# Patient Record
Sex: MALE | Race: WHITE | NOT HISPANIC OR LATINO | Employment: FULL TIME | URBAN - METROPOLITAN AREA
[De-identification: names, ages, dates, MRNs, and addresses within clinical notes are randomized per-mention and may not be internally consistent; named-entity substitution may affect disease eponyms.]

---

## 2018-08-10 ENCOUNTER — APPOINTMENT (OUTPATIENT)
Dept: PREADMISSION TESTING | Facility: HOSPITAL | Age: 53
End: 2018-08-10

## 2018-08-10 ENCOUNTER — HOSPITAL ENCOUNTER (OUTPATIENT)
Dept: GENERAL RADIOLOGY | Facility: HOSPITAL | Age: 53
Discharge: HOME | End: 2018-08-10
Admitting: COLON & RECTAL SURGERY

## 2018-08-10 VITALS — WEIGHT: 255.51 LBS | BODY MASS INDEX: 34.61 KG/M2 | HEIGHT: 72 IN

## 2018-08-10 DIAGNOSIS — Z01.89 LABORATORY TEST: Primary | ICD-10-CM

## 2018-08-10 LAB
ABO GROUP BLD: NORMAL
ANION GAP SERPL CALCULATED.3IONS-SCNC: 2 MMOL/L (ref 3–11)
BUN BLD-MCNC: 16 MG/DL (ref 9–23)
BUN/CREAT SERPL: 19.5 (ref 7–25)
CALCIUM SPEC-SCNC: 9.2 MG/DL (ref 8.7–10.4)
CHLORIDE SERPL-SCNC: 109 MMOL/L (ref 99–109)
CO2 SERPL-SCNC: 30 MMOL/L (ref 20–31)
CREAT BLD-MCNC: 0.82 MG/DL (ref 0.6–1.3)
DEPRECATED RDW RBC AUTO: 44 FL (ref 37–54)
ERYTHROCYTE [DISTWIDTH] IN BLOOD BY AUTOMATED COUNT: 14.1 % (ref 11.3–14.5)
GFR SERPL CREATININE-BSD FRML MDRD: 98 ML/MIN/1.73
GLUCOSE BLD-MCNC: 138 MG/DL (ref 70–100)
HBA1C MFR BLD: 6.7 % (ref 4.8–5.6)
HCT VFR BLD AUTO: 42 % (ref 38.9–50.9)
HGB BLD-MCNC: 13.1 G/DL (ref 13.1–17.5)
MCH RBC QN AUTO: 26.8 PG (ref 27–31)
MCHC RBC AUTO-ENTMCNC: 31.2 G/DL (ref 32–36)
MCV RBC AUTO: 85.9 FL (ref 80–99)
PLATELET # BLD AUTO: 223 10*3/MM3 (ref 150–450)
PMV BLD AUTO: 11.7 FL (ref 6–12)
POTASSIUM BLD-SCNC: 4.3 MMOL/L (ref 3.5–5.5)
RBC # BLD AUTO: 4.89 10*6/MM3 (ref 4.2–5.76)
RH BLD: NEGATIVE
SODIUM BLD-SCNC: 141 MMOL/L (ref 132–146)
WBC NRBC COR # BLD: 6.99 10*3/MM3 (ref 3.5–10.8)

## 2018-08-10 PROCEDURE — 93010 ELECTROCARDIOGRAM REPORT: CPT | Performed by: INTERNAL MEDICINE

## 2018-08-10 PROCEDURE — 86901 BLOOD TYPING SEROLOGIC RH(D): CPT

## 2018-08-10 PROCEDURE — 36415 COLL VENOUS BLD VENIPUNCTURE: CPT

## 2018-08-10 PROCEDURE — 85027 COMPLETE CBC AUTOMATED: CPT | Performed by: COLON & RECTAL SURGERY

## 2018-08-10 PROCEDURE — 83036 HEMOGLOBIN GLYCOSYLATED A1C: CPT | Performed by: COLON & RECTAL SURGERY

## 2018-08-10 PROCEDURE — 86900 BLOOD TYPING SEROLOGIC ABO: CPT

## 2018-08-10 PROCEDURE — 93005 ELECTROCARDIOGRAM TRACING: CPT

## 2018-08-10 PROCEDURE — 71046 X-RAY EXAM CHEST 2 VIEWS: CPT

## 2018-08-10 PROCEDURE — 80048 BASIC METABOLIC PNL TOTAL CA: CPT | Performed by: COLON & RECTAL SURGERY

## 2018-08-10 RX ORDER — AMOXICILLIN 500 MG/1
500 CAPSULE ORAL 2 TIMES DAILY
COMMUNITY
End: 2018-08-18 | Stop reason: HOSPADM

## 2018-08-10 RX ORDER — PHENOL 1.4 %
AEROSOL, SPRAY (ML) MUCOUS MEMBRANE AS NEEDED
COMMUNITY

## 2018-08-10 RX ORDER — LISINOPRIL 10 MG/1
10 TABLET ORAL DAILY
COMMUNITY
End: 2022-05-13 | Stop reason: SDUPTHER

## 2018-08-10 RX ORDER — ASPIRIN 81 MG/1
81 TABLET, CHEWABLE ORAL DAILY
COMMUNITY

## 2018-08-10 NOTE — DISCHARGE INSTRUCTIONS

## 2018-08-10 NOTE — PAT
Patient to apply Chlorhexadine wipes  to surgical area (as instructed) the night before procedure and the AM of procedure. Wipes provided.    ERAS info given and explained to patient.    Patient instructed to drink 20 ounces (or until full) of Gatorade or 20 ounces of G2 (if diabetic) and complete 3 hours before your surgery start time. (NO RED Gatorade or G2)    Patient verbalized understanding.    GI nurse navigator notified of upcoming admission.

## 2018-08-14 ENCOUNTER — ANESTHESIA EVENT (OUTPATIENT)
Dept: PERIOP | Facility: HOSPITAL | Age: 53
End: 2018-08-14

## 2018-08-14 RX ORDER — FAMOTIDINE 10 MG/ML
20 INJECTION, SOLUTION INTRAVENOUS ONCE
Status: CANCELLED | OUTPATIENT
Start: 2018-08-14 | End: 2018-08-14

## 2018-08-15 ENCOUNTER — HOSPITAL ENCOUNTER (INPATIENT)
Facility: HOSPITAL | Age: 53
LOS: 3 days | Discharge: HOME | End: 2018-08-18
Attending: COLON & RECTAL SURGERY | Admitting: COLON & RECTAL SURGERY

## 2018-08-15 ENCOUNTER — ANESTHESIA (OUTPATIENT)
Dept: PERIOP | Facility: HOSPITAL | Age: 53
End: 2018-08-15

## 2018-08-15 DIAGNOSIS — K57.92 DIVERTICULITIS: ICD-10-CM

## 2018-08-15 PROBLEM — Z90.49 S/P COLON RESECTION: Status: ACTIVE | Noted: 2018-08-15

## 2018-08-15 PROBLEM — R73.03 PREDIABETES: Status: ACTIVE | Noted: 2018-08-15

## 2018-08-15 PROBLEM — D62 ACUTE BLOOD LOSS ANEMIA: Status: ACTIVE | Noted: 2018-08-15

## 2018-08-15 PROBLEM — D72.829 LEUKOCYTOSIS: Status: ACTIVE | Noted: 2018-08-15

## 2018-08-15 PROBLEM — I10 HTN (HYPERTENSION): Status: ACTIVE | Noted: 2018-08-15

## 2018-08-15 PROBLEM — E11.9 DM (DIABETES MELLITUS) (HCC): Status: ACTIVE | Noted: 2018-08-15

## 2018-08-15 LAB
ABO GROUP BLD: NORMAL
BASOPHILS # BLD AUTO: 0.02 10*3/MM3 (ref 0–0.2)
BASOPHILS NFR BLD AUTO: 0.2 % (ref 0–1)
BLD GP AB SCN SERPL QL: NEGATIVE
DEPRECATED RDW RBC AUTO: 43.2 FL (ref 37–54)
DEPRECATED RDW RBC AUTO: 44 FL (ref 37–54)
EOSINOPHIL # BLD AUTO: 0.09 10*3/MM3 (ref 0–0.3)
EOSINOPHIL NFR BLD AUTO: 0.7 % (ref 0–3)
ERYTHROCYTE [DISTWIDTH] IN BLOOD BY AUTOMATED COUNT: 14 % (ref 11.3–14.5)
ERYTHROCYTE [DISTWIDTH] IN BLOOD BY AUTOMATED COUNT: 14.2 % (ref 11.3–14.5)
GLUCOSE BLDC GLUCOMTR-MCNC: 254 MG/DL (ref 70–130)
GLUCOSE BLDC GLUCOMTR-MCNC: 258 MG/DL (ref 70–130)
GLUCOSE BLDC GLUCOMTR-MCNC: 284 MG/DL (ref 70–130)
HCT VFR BLD AUTO: 38.5 % (ref 38.9–50.9)
HCT VFR BLD AUTO: 39.4 % (ref 38.9–50.9)
HGB BLD-MCNC: 12 G/DL (ref 13.1–17.5)
HGB BLD-MCNC: 12 G/DL (ref 13.1–17.5)
IMM GRANULOCYTES # BLD: 0.03 10*3/MM3 (ref 0–0.03)
IMM GRANULOCYTES NFR BLD: 0.2 % (ref 0–0.6)
LYMPHOCYTES # BLD AUTO: 1.2 10*3/MM3 (ref 0.6–4.8)
LYMPHOCYTES NFR BLD AUTO: 9.5 % (ref 24–44)
MCH RBC QN AUTO: 26.3 PG (ref 27–31)
MCH RBC QN AUTO: 26.4 PG (ref 27–31)
MCHC RBC AUTO-ENTMCNC: 30.5 G/DL (ref 32–36)
MCHC RBC AUTO-ENTMCNC: 31.2 G/DL (ref 32–36)
MCV RBC AUTO: 84.8 FL (ref 80–99)
MCV RBC AUTO: 86.4 FL (ref 80–99)
MONOCYTES # BLD AUTO: 0.69 10*3/MM3 (ref 0–1)
MONOCYTES NFR BLD AUTO: 5.5 % (ref 0–12)
NEUTROPHILS # BLD AUTO: 10.65 10*3/MM3 (ref 1.5–8.3)
NEUTROPHILS NFR BLD AUTO: 84.1 % (ref 41–71)
PLATELET # BLD AUTO: 212 10*3/MM3 (ref 150–450)
PLATELET # BLD AUTO: 243 10*3/MM3 (ref 150–450)
PMV BLD AUTO: 11.5 FL (ref 6–12)
PMV BLD AUTO: 11.5 FL (ref 6–12)
POTASSIUM BLDA-SCNC: 4.25 MMOL/L (ref 3.5–5.3)
RBC # BLD AUTO: 4.54 10*6/MM3 (ref 4.2–5.76)
RBC # BLD AUTO: 4.56 10*6/MM3 (ref 4.2–5.76)
RH BLD: NEGATIVE
T&S EXPIRATION DATE: NORMAL
WBC NRBC COR # BLD: 12.65 10*3/MM3 (ref 3.5–10.8)
WBC NRBC COR # BLD: 15.11 10*3/MM3 (ref 3.5–10.8)

## 2018-08-15 PROCEDURE — C1758 CATHETER, URETERAL: HCPCS | Performed by: COLON & RECTAL SURGERY

## 2018-08-15 PROCEDURE — 25010000002 DEXAMETHASONE SODIUM PHOSPHATE 10 MG/ML SOLUTION 1 ML VIAL: Performed by: NURSE ANESTHETIST, CERTIFIED REGISTERED

## 2018-08-15 PROCEDURE — 0T9B80Z DRAINAGE OF BLADDER WITH DRAINAGE DEVICE, VIA NATURAL OR ARTIFICIAL OPENING ENDOSCOPIC: ICD-10-PCS | Performed by: UROLOGY

## 2018-08-15 PROCEDURE — 25010000002 ONDANSETRON PER 1 MG: Performed by: NURSE PRACTITIONER

## 2018-08-15 PROCEDURE — 63710000001 INSULIN LISPRO (HUMAN) PER 5 UNITS: Performed by: NURSE PRACTITIONER

## 2018-08-15 PROCEDURE — 84132 ASSAY OF SERUM POTASSIUM: CPT | Performed by: ANESTHESIOLOGY

## 2018-08-15 PROCEDURE — 88304 TISSUE EXAM BY PATHOLOGIST: CPT | Performed by: COLON & RECTAL SURGERY

## 2018-08-15 PROCEDURE — 86901 BLOOD TYPING SEROLOGIC RH(D): CPT | Performed by: COLON & RECTAL SURGERY

## 2018-08-15 PROCEDURE — 88307 TISSUE EXAM BY PATHOLOGIST: CPT | Performed by: COLON & RECTAL SURGERY

## 2018-08-15 PROCEDURE — 25010000002 ALBUMIN HUMAN 5% PER 50 ML: Performed by: NURSE ANESTHETIST, CERTIFIED REGISTERED

## 2018-08-15 PROCEDURE — C1769 GUIDE WIRE: HCPCS | Performed by: COLON & RECTAL SURGERY

## 2018-08-15 PROCEDURE — 85025 COMPLETE CBC W/AUTO DIFF WBC: CPT | Performed by: COLON & RECTAL SURGERY

## 2018-08-15 PROCEDURE — 25010000002 HYDROMORPHONE PER 4 MG: Performed by: NURSE ANESTHETIST, CERTIFIED REGISTERED

## 2018-08-15 PROCEDURE — 25010000002 ONDANSETRON PER 1 MG: Performed by: NURSE ANESTHETIST, CERTIFIED REGISTERED

## 2018-08-15 PROCEDURE — 0DBP0ZZ EXCISION OF RECTUM, OPEN APPROACH: ICD-10-PCS | Performed by: COLON & RECTAL SURGERY

## 2018-08-15 PROCEDURE — 25010000002 FENTANYL CITRATE (PF) 100 MCG/2ML SOLUTION: Performed by: NURSE ANESTHETIST, CERTIFIED REGISTERED

## 2018-08-15 PROCEDURE — 85027 COMPLETE CBC AUTOMATED: CPT | Performed by: NURSE PRACTITIONER

## 2018-08-15 PROCEDURE — 25010000002 DEXAMETHASONE PER 1 MG: Performed by: NURSE ANESTHETIST, CERTIFIED REGISTERED

## 2018-08-15 PROCEDURE — 0DTN0ZZ RESECTION OF SIGMOID COLON, OPEN APPROACH: ICD-10-PCS | Performed by: COLON & RECTAL SURGERY

## 2018-08-15 PROCEDURE — 86900 BLOOD TYPING SEROLOGIC ABO: CPT | Performed by: COLON & RECTAL SURGERY

## 2018-08-15 PROCEDURE — 25010000002 MORPHINE SULFATE (PF) 2 MG/ML SOLUTION: Performed by: COLON & RECTAL SURGERY

## 2018-08-15 PROCEDURE — 82962 GLUCOSE BLOOD TEST: CPT

## 2018-08-15 PROCEDURE — 25010000003 CEFAZOLIN PER 500 MG: Performed by: NURSE ANESTHETIST, CERTIFIED REGISTERED

## 2018-08-15 PROCEDURE — 0DTJ0ZZ RESECTION OF APPENDIX, OPEN APPROACH: ICD-10-PCS | Performed by: COLON & RECTAL SURGERY

## 2018-08-15 PROCEDURE — 86850 RBC ANTIBODY SCREEN: CPT | Performed by: COLON & RECTAL SURGERY

## 2018-08-15 PROCEDURE — 0D1B0Z4 BYPASS ILEUM TO CUTANEOUS, OPEN APPROACH: ICD-10-PCS | Performed by: COLON & RECTAL SURGERY

## 2018-08-15 PROCEDURE — 25010000002 HEPARIN (PORCINE) PER 1000 UNITS: Performed by: COLON & RECTAL SURGERY

## 2018-08-15 PROCEDURE — 25010000002 PROPOFOL 10 MG/ML EMULSION: Performed by: NURSE ANESTHETIST, CERTIFIED REGISTERED

## 2018-08-15 PROCEDURE — 25010000002 ERTAPENEM 1 GM/100ML SOLUTION: Performed by: COLON & RECTAL SURGERY

## 2018-08-15 PROCEDURE — 63710000001 INSULIN DETEMIR PER 5 UNITS: Performed by: INTERNAL MEDICINE

## 2018-08-15 PROCEDURE — 25010000002 NEOSTIGMINE 10 MG/10ML SOLUTION: Performed by: NURSE ANESTHETIST, CERTIFIED REGISTERED

## 2018-08-15 PROCEDURE — P9041 ALBUMIN (HUMAN),5%, 50ML: HCPCS | Performed by: NURSE ANESTHETIST, CERTIFIED REGISTERED

## 2018-08-15 PROCEDURE — 25010000002 BUPRENORPHINE PER 0.1 MG: Performed by: NURSE ANESTHETIST, CERTIFIED REGISTERED

## 2018-08-15 PROCEDURE — 25810000003 SODIUM CHLORIDE 0.9 % WITH KCL 20 MEQ 20-0.9 MEQ/L-% SOLUTION: Performed by: COLON & RECTAL SURGERY

## 2018-08-15 PROCEDURE — 25810000003 SODIUM CHLORIDE 0.9 % WITH KCL 20 MEQ 20-0.9 MEQ/L-% SOLUTION: Performed by: INTERNAL MEDICINE

## 2018-08-15 DEVICE — SEALANT FIBRIN TISSEEL FZ 4ML: Type: IMPLANTABLE DEVICE | Site: ABDOMEN | Status: FUNCTIONAL

## 2018-08-15 RX ORDER — PROMETHAZINE HYDROCHLORIDE 25 MG/1
25 TABLET ORAL ONCE AS NEEDED
Status: DISCONTINUED | OUTPATIENT
Start: 2018-08-15 | End: 2018-08-15 | Stop reason: HOSPADM

## 2018-08-15 RX ORDER — GLYCOPYRROLATE 0.2 MG/ML
INJECTION INTRAMUSCULAR; INTRAVENOUS AS NEEDED
Status: DISCONTINUED | OUTPATIENT
Start: 2018-08-15 | End: 2018-08-15 | Stop reason: SURG

## 2018-08-15 RX ORDER — PROMETHAZINE HYDROCHLORIDE 25 MG/1
25 SUPPOSITORY RECTAL ONCE AS NEEDED
Status: DISCONTINUED | OUTPATIENT
Start: 2018-08-15 | End: 2018-08-15 | Stop reason: HOSPADM

## 2018-08-15 RX ORDER — ALVIMOPAN 12 MG/1
12 CAPSULE ORAL ONCE
Status: COMPLETED | OUTPATIENT
Start: 2018-08-15 | End: 2018-08-15

## 2018-08-15 RX ORDER — ONDANSETRON 2 MG/ML
INJECTION INTRAMUSCULAR; INTRAVENOUS AS NEEDED
Status: DISCONTINUED | OUTPATIENT
Start: 2018-08-15 | End: 2018-08-15 | Stop reason: SURG

## 2018-08-15 RX ORDER — ROCURONIUM BROMIDE 10 MG/ML
INJECTION, SOLUTION INTRAVENOUS AS NEEDED
Status: DISCONTINUED | OUTPATIENT
Start: 2018-08-15 | End: 2018-08-15 | Stop reason: SURG

## 2018-08-15 RX ORDER — MELOXICAM 7.5 MG/1
15 TABLET ORAL ONCE
Status: COMPLETED | OUTPATIENT
Start: 2018-08-15 | End: 2018-08-15

## 2018-08-15 RX ORDER — DEXTROSE, SODIUM CHLORIDE, AND POTASSIUM CHLORIDE 5; .45; .15 G/100ML; G/100ML; G/100ML
200 INJECTION INTRAVENOUS CONTINUOUS
Status: DISCONTINUED | OUTPATIENT
Start: 2018-08-15 | End: 2018-08-15

## 2018-08-15 RX ORDER — LIDOCAINE HYDROCHLORIDE 10 MG/ML
0.5 INJECTION, SOLUTION EPIDURAL; INFILTRATION; INTRACAUDAL; PERINEURAL ONCE AS NEEDED
Status: COMPLETED | OUTPATIENT
Start: 2018-08-15 | End: 2018-08-15

## 2018-08-15 RX ORDER — LABETALOL HYDROCHLORIDE 5 MG/ML
INJECTION, SOLUTION INTRAVENOUS AS NEEDED
Status: DISCONTINUED | OUTPATIENT
Start: 2018-08-15 | End: 2018-08-15 | Stop reason: SURG

## 2018-08-15 RX ORDER — ONDANSETRON 2 MG/ML
4 INJECTION INTRAMUSCULAR; INTRAVENOUS EVERY 6 HOURS PRN
Status: DISCONTINUED | OUTPATIENT
Start: 2018-08-15 | End: 2018-08-18 | Stop reason: HOSPADM

## 2018-08-15 RX ORDER — NICOTINE POLACRILEX 4 MG
15 LOZENGE BUCCAL
Status: DISCONTINUED | OUTPATIENT
Start: 2018-08-15 | End: 2018-08-18 | Stop reason: HOSPADM

## 2018-08-15 RX ORDER — DIAZEPAM 5 MG/ML
5 INJECTION, SOLUTION INTRAMUSCULAR; INTRAVENOUS EVERY 6 HOURS PRN
Status: DISCONTINUED | OUTPATIENT
Start: 2018-08-15 | End: 2018-08-18 | Stop reason: HOSPADM

## 2018-08-15 RX ORDER — LABETALOL HYDROCHLORIDE 5 MG/ML
10 INJECTION, SOLUTION INTRAVENOUS EVERY 4 HOURS PRN
Status: DISCONTINUED | OUTPATIENT
Start: 2018-08-15 | End: 2018-08-18 | Stop reason: HOSPADM

## 2018-08-15 RX ORDER — MAGNESIUM HYDROXIDE 1200 MG/15ML
LIQUID ORAL AS NEEDED
Status: DISCONTINUED | OUTPATIENT
Start: 2018-08-15 | End: 2018-08-15 | Stop reason: HOSPADM

## 2018-08-15 RX ORDER — PROMETHAZINE HYDROCHLORIDE 25 MG/ML
6.25 INJECTION, SOLUTION INTRAMUSCULAR; INTRAVENOUS ONCE AS NEEDED
Status: DISCONTINUED | OUTPATIENT
Start: 2018-08-15 | End: 2018-08-15 | Stop reason: HOSPADM

## 2018-08-15 RX ORDER — FAMOTIDINE 20 MG/1
20 TABLET, FILM COATED ORAL ONCE
Status: COMPLETED | OUTPATIENT
Start: 2018-08-15 | End: 2018-08-15

## 2018-08-15 RX ORDER — OXYCODONE AND ACETAMINOPHEN 7.5; 325 MG/1; MG/1
1 TABLET ORAL ONCE AS NEEDED
Status: DISCONTINUED | OUTPATIENT
Start: 2018-08-15 | End: 2018-08-15 | Stop reason: HOSPADM

## 2018-08-15 RX ORDER — ONDANSETRON 2 MG/ML
4 INJECTION INTRAMUSCULAR; INTRAVENOUS ONCE AS NEEDED
Status: COMPLETED | OUTPATIENT
Start: 2018-08-15 | End: 2018-08-15

## 2018-08-15 RX ORDER — HEPARIN SODIUM 5000 [USP'U]/ML
5000 INJECTION, SOLUTION INTRAVENOUS; SUBCUTANEOUS EVERY 8 HOURS SCHEDULED
Status: DISCONTINUED | OUTPATIENT
Start: 2018-08-16 | End: 2018-08-18 | Stop reason: HOSPADM

## 2018-08-15 RX ORDER — ACETAMINOPHEN 650 MG/1
650 SUPPOSITORY RECTAL ONCE AS NEEDED
Status: DISCONTINUED | OUTPATIENT
Start: 2018-08-15 | End: 2018-08-15 | Stop reason: HOSPADM

## 2018-08-15 RX ORDER — ALVIMOPAN 12 MG/1
12 CAPSULE ORAL 2 TIMES DAILY
Status: DISCONTINUED | OUTPATIENT
Start: 2018-08-16 | End: 2018-08-16

## 2018-08-15 RX ORDER — NALOXONE HCL 0.4 MG/ML
0.4 VIAL (ML) INJECTION
Status: DISCONTINUED | OUTPATIENT
Start: 2018-08-15 | End: 2018-08-18 | Stop reason: HOSPADM

## 2018-08-15 RX ORDER — MEPERIDINE HYDROCHLORIDE 25 MG/ML
12.5 INJECTION INTRAMUSCULAR; INTRAVENOUS; SUBCUTANEOUS
Status: DISCONTINUED | OUTPATIENT
Start: 2018-08-15 | End: 2018-08-15 | Stop reason: HOSPADM

## 2018-08-15 RX ORDER — SODIUM CHLORIDE, SODIUM LACTATE, POTASSIUM CHLORIDE, CALCIUM CHLORIDE 600; 310; 30; 20 MG/100ML; MG/100ML; MG/100ML; MG/100ML
9 INJECTION, SOLUTION INTRAVENOUS CONTINUOUS
Status: DISCONTINUED | OUTPATIENT
Start: 2018-08-15 | End: 2018-08-15 | Stop reason: HOSPADM

## 2018-08-15 RX ORDER — PREGABALIN 75 MG/1
75 CAPSULE ORAL ONCE
Status: COMPLETED | OUTPATIENT
Start: 2018-08-15 | End: 2018-08-15

## 2018-08-15 RX ORDER — CEFAZOLIN SODIUM 1 G/3ML
INJECTION, POWDER, FOR SOLUTION INTRAMUSCULAR; INTRAVENOUS AS NEEDED
Status: DISCONTINUED | OUTPATIENT
Start: 2018-08-15 | End: 2018-08-15 | Stop reason: SURG

## 2018-08-15 RX ORDER — OXYCODONE AND ACETAMINOPHEN 10; 325 MG/1; MG/1
1 TABLET ORAL EVERY 4 HOURS PRN
Status: DISCONTINUED | OUTPATIENT
Start: 2018-08-15 | End: 2018-08-15 | Stop reason: HOSPADM

## 2018-08-15 RX ORDER — PROPOFOL 10 MG/ML
VIAL (ML) INTRAVENOUS AS NEEDED
Status: DISCONTINUED | OUTPATIENT
Start: 2018-08-15 | End: 2018-08-15 | Stop reason: SURG

## 2018-08-15 RX ORDER — SODIUM CHLORIDE 0.9 % (FLUSH) 0.9 %
1-10 SYRINGE (ML) INJECTION AS NEEDED
Status: DISCONTINUED | OUTPATIENT
Start: 2018-08-15 | End: 2018-08-18 | Stop reason: HOSPADM

## 2018-08-15 RX ORDER — NEOSTIGMINE METHYLSULFATE 1 MG/ML
INJECTION, SOLUTION INTRAVENOUS AS NEEDED
Status: DISCONTINUED | OUTPATIENT
Start: 2018-08-15 | End: 2018-08-15 | Stop reason: SURG

## 2018-08-15 RX ORDER — OXYCODONE HYDROCHLORIDE AND ACETAMINOPHEN 5; 325 MG/1; MG/1
1 TABLET ORAL EVERY 4 HOURS PRN
Status: DISCONTINUED | OUTPATIENT
Start: 2018-08-15 | End: 2018-08-16 | Stop reason: SDUPTHER

## 2018-08-15 RX ORDER — HYDROMORPHONE HYDROCHLORIDE 1 MG/ML
0.5 INJECTION, SOLUTION INTRAMUSCULAR; INTRAVENOUS; SUBCUTANEOUS
Status: DISCONTINUED | OUTPATIENT
Start: 2018-08-15 | End: 2018-08-15 | Stop reason: HOSPADM

## 2018-08-15 RX ORDER — DEXTROSE MONOHYDRATE 25 G/50ML
25 INJECTION, SOLUTION INTRAVENOUS
Status: DISCONTINUED | OUTPATIENT
Start: 2018-08-15 | End: 2018-08-18 | Stop reason: HOSPADM

## 2018-08-15 RX ORDER — DIAZEPAM 5 MG/1
5 TABLET ORAL EVERY 6 HOURS PRN
Status: DISCONTINUED | OUTPATIENT
Start: 2018-08-15 | End: 2018-08-18 | Stop reason: HOSPADM

## 2018-08-15 RX ORDER — FENTANYL CITRATE 50 UG/ML
INJECTION, SOLUTION INTRAMUSCULAR; INTRAVENOUS AS NEEDED
Status: DISCONTINUED | OUTPATIENT
Start: 2018-08-15 | End: 2018-08-15 | Stop reason: SURG

## 2018-08-15 RX ORDER — DEXAMETHASONE SODIUM PHOSPHATE 4 MG/ML
INJECTION, SOLUTION INTRA-ARTICULAR; INTRALESIONAL; INTRAMUSCULAR; INTRAVENOUS; SOFT TISSUE AS NEEDED
Status: DISCONTINUED | OUTPATIENT
Start: 2018-08-15 | End: 2018-08-15 | Stop reason: SURG

## 2018-08-15 RX ORDER — SODIUM CHLORIDE 0.9 % (FLUSH) 0.9 %
1-10 SYRINGE (ML) INJECTION AS NEEDED
Status: DISCONTINUED | OUTPATIENT
Start: 2018-08-15 | End: 2018-08-15 | Stop reason: HOSPADM

## 2018-08-15 RX ORDER — VECURONIUM BROMIDE 1 MG/ML
INJECTION, POWDER, LYOPHILIZED, FOR SOLUTION INTRAVENOUS AS NEEDED
Status: DISCONTINUED | OUTPATIENT
Start: 2018-08-15 | End: 2018-08-15 | Stop reason: SURG

## 2018-08-15 RX ORDER — FENTANYL CITRATE 50 UG/ML
50 INJECTION, SOLUTION INTRAMUSCULAR; INTRAVENOUS
Status: DISCONTINUED | OUTPATIENT
Start: 2018-08-15 | End: 2018-08-15 | Stop reason: HOSPADM

## 2018-08-15 RX ORDER — MORPHINE SULFATE 2 MG/ML
2 INJECTION, SOLUTION INTRAMUSCULAR; INTRAVENOUS EVERY 4 HOURS PRN
Status: DISCONTINUED | OUTPATIENT
Start: 2018-08-15 | End: 2018-08-18 | Stop reason: HOSPADM

## 2018-08-15 RX ORDER — LIDOCAINE HYDROCHLORIDE 10 MG/ML
INJECTION, SOLUTION EPIDURAL; INFILTRATION; INTRACAUDAL; PERINEURAL AS NEEDED
Status: DISCONTINUED | OUTPATIENT
Start: 2018-08-15 | End: 2018-08-15 | Stop reason: SURG

## 2018-08-15 RX ORDER — HEPARIN SODIUM 5000 [USP'U]/ML
5000 INJECTION, SOLUTION INTRAVENOUS; SUBCUTANEOUS ONCE
Status: COMPLETED | OUTPATIENT
Start: 2018-08-15 | End: 2018-08-15

## 2018-08-15 RX ORDER — SODIUM CHLORIDE AND POTASSIUM CHLORIDE 150; 900 MG/100ML; MG/100ML
100 INJECTION, SOLUTION INTRAVENOUS CONTINUOUS
Status: DISCONTINUED | OUTPATIENT
Start: 2018-08-15 | End: 2018-08-17

## 2018-08-15 RX ORDER — SCOLOPAMINE TRANSDERMAL SYSTEM 1 MG/1
1 PATCH, EXTENDED RELEASE TRANSDERMAL ONCE
Status: DISCONTINUED | OUTPATIENT
Start: 2018-08-15 | End: 2018-08-18

## 2018-08-15 RX ORDER — ALBUMIN, HUMAN INJ 5% 5 %
SOLUTION INTRAVENOUS CONTINUOUS PRN
Status: DISCONTINUED | OUTPATIENT
Start: 2018-08-15 | End: 2018-08-15 | Stop reason: SURG

## 2018-08-15 RX ORDER — ACETAMINOPHEN 500 MG
1000 TABLET ORAL ONCE
Status: COMPLETED | OUTPATIENT
Start: 2018-08-15 | End: 2018-08-15

## 2018-08-15 RX ORDER — IPRATROPIUM BROMIDE AND ALBUTEROL SULFATE 2.5; .5 MG/3ML; MG/3ML
3 SOLUTION RESPIRATORY (INHALATION) ONCE AS NEEDED
Status: DISCONTINUED | OUTPATIENT
Start: 2018-08-15 | End: 2018-08-15 | Stop reason: HOSPADM

## 2018-08-15 RX ORDER — ACETAMINOPHEN 325 MG/1
650 TABLET ORAL ONCE AS NEEDED
Status: DISCONTINUED | OUTPATIENT
Start: 2018-08-15 | End: 2018-08-15 | Stop reason: HOSPADM

## 2018-08-15 RX ORDER — ACETAMINOPHEN 500 MG
1000 TABLET ORAL EVERY 6 HOURS
Status: DISPENSED | OUTPATIENT
Start: 2018-08-15 | End: 2018-08-17

## 2018-08-15 RX ADMIN — VECURONIUM BROMIDE 2 MG: 1 INJECTION, POWDER, LYOPHILIZED, FOR SOLUTION INTRAVENOUS at 08:57

## 2018-08-15 RX ADMIN — HYDROMORPHONE HYDROCHLORIDE 0.5 MG: 1 INJECTION, SOLUTION INTRAMUSCULAR; INTRAVENOUS; SUBCUTANEOUS at 12:10

## 2018-08-15 RX ADMIN — NEOSTIGMINE METHYLSULFATE 3 MG: 1 INJECTION, SOLUTION INTRAVENOUS at 11:27

## 2018-08-15 RX ADMIN — DEXAMETHASONE SODIUM PHOSPHATE 61.4 ML: 10 INJECTION, SOLUTION INTRAMUSCULAR; INTRAVENOUS at 07:44

## 2018-08-15 RX ADMIN — PREGABALIN 75 MG: 75 CAPSULE ORAL at 06:58

## 2018-08-15 RX ADMIN — DEXAMETHASONE SODIUM PHOSPHATE 8 MG: 4 INJECTION, SOLUTION INTRAMUSCULAR; INTRAVENOUS at 08:08

## 2018-08-15 RX ADMIN — CEFAZOLIN 1 G: 1 INJECTION, POWDER, FOR SOLUTION INTRAVENOUS at 10:36

## 2018-08-15 RX ADMIN — SCOPOLAMINE 1 PATCH: 1 PATCH, EXTENDED RELEASE TRANSDERMAL at 06:58

## 2018-08-15 RX ADMIN — FENTANYL CITRATE 50 MCG: 50 INJECTION INTRAMUSCULAR; INTRAVENOUS at 12:44

## 2018-08-15 RX ADMIN — FENTANYL CITRATE 50 MCG: 50 INJECTION, SOLUTION INTRAMUSCULAR; INTRAVENOUS at 08:08

## 2018-08-15 RX ADMIN — INSULIN LISPRO 4 UNITS: 100 INJECTION, SOLUTION INTRAVENOUS; SUBCUTANEOUS at 21:26

## 2018-08-15 RX ADMIN — OXYCODONE HYDROCHLORIDE AND ACETAMINOPHEN 1 TABLET: 5; 325 TABLET ORAL at 21:25

## 2018-08-15 RX ADMIN — SODIUM CHLORIDE, POTASSIUM CHLORIDE, SODIUM LACTATE AND CALCIUM CHLORIDE: 600; 310; 30; 20 INJECTION, SOLUTION INTRAVENOUS at 08:56

## 2018-08-15 RX ADMIN — POTASSIUM CHLORIDE, DEXTROSE MONOHYDRATE AND SODIUM CHLORIDE 200 ML/HR: 150; 5; 450 INJECTION, SOLUTION INTRAVENOUS at 18:33

## 2018-08-15 RX ADMIN — FAMOTIDINE 20 MG: 20 TABLET ORAL at 06:58

## 2018-08-15 RX ADMIN — SODIUM CHLORIDE, POTASSIUM CHLORIDE, SODIUM LACTATE AND CALCIUM CHLORIDE 9 ML/HR: 600; 310; 30; 20 INJECTION, SOLUTION INTRAVENOUS at 07:00

## 2018-08-15 RX ADMIN — MORPHINE SULFATE 2 MG: 2 INJECTION, SOLUTION INTRAMUSCULAR; INTRAVENOUS at 13:59

## 2018-08-15 RX ADMIN — HEPARIN SODIUM 5000 UNITS: 5000 INJECTION, SOLUTION INTRAVENOUS; SUBCUTANEOUS at 06:57

## 2018-08-15 RX ADMIN — VECURONIUM BROMIDE 2 MG: 1 INJECTION, POWDER, LYOPHILIZED, FOR SOLUTION INTRAVENOUS at 08:32

## 2018-08-15 RX ADMIN — ONDANSETRON 4 MG: 2 INJECTION INTRAMUSCULAR; INTRAVENOUS at 11:27

## 2018-08-15 RX ADMIN — ERTAPENEM SODIUM 1 G: 1 INJECTION, POWDER, LYOPHILIZED, FOR SOLUTION INTRAMUSCULAR; INTRAVENOUS at 07:40

## 2018-08-15 RX ADMIN — ALVIMOPAN 12 MG: 12 CAPSULE ORAL at 06:58

## 2018-08-15 RX ADMIN — LIDOCAINE HYDROCHLORIDE 0.3 ML: 10 INJECTION, SOLUTION EPIDURAL; INFILTRATION; INTRACAUDAL; PERINEURAL at 06:59

## 2018-08-15 RX ADMIN — MELOXICAM 15 MG: 7.5 TABLET ORAL at 06:58

## 2018-08-15 RX ADMIN — LABETALOL HYDROCHLORIDE 10 MG: 5 INJECTION, SOLUTION INTRAVENOUS at 08:42

## 2018-08-15 RX ADMIN — ONDANSETRON 4 MG: 2 INJECTION, SOLUTION INTRAMUSCULAR; INTRAVENOUS at 12:38

## 2018-08-15 RX ADMIN — OXYCODONE HYDROCHLORIDE AND ACETAMINOPHEN 1 TABLET: 5; 325 TABLET ORAL at 16:32

## 2018-08-15 RX ADMIN — LIDOCAINE HYDROCHLORIDE 50 MG: 10 INJECTION, SOLUTION EPIDURAL; INFILTRATION; INTRACAUDAL; PERINEURAL at 07:36

## 2018-08-15 RX ADMIN — PROPOFOL 200 MG: 10 INJECTION, EMULSION INTRAVENOUS at 07:36

## 2018-08-15 RX ADMIN — INSULIN LISPRO 4 UNITS: 100 INJECTION, SOLUTION INTRAVENOUS; SUBCUTANEOUS at 18:28

## 2018-08-15 RX ADMIN — ROCURONIUM BROMIDE 50 MG: 10 SOLUTION INTRAVENOUS at 07:36

## 2018-08-15 RX ADMIN — LABETALOL HYDROCHLORIDE 10 MG: 5 INJECTION, SOLUTION INTRAVENOUS at 08:13

## 2018-08-15 RX ADMIN — FENTANYL CITRATE 50 MCG: 50 INJECTION, SOLUTION INTRAMUSCULAR; INTRAVENOUS at 07:36

## 2018-08-15 RX ADMIN — GLYCOPYRROLATE 0.4 MG: 0.2 INJECTION, SOLUTION INTRAMUSCULAR; INTRAVENOUS at 11:27

## 2018-08-15 RX ADMIN — DIAZEPAM 5 MG: 5 TABLET ORAL at 23:49

## 2018-08-15 RX ADMIN — ALBUMIN HUMAN: 0.05 INJECTION, SOLUTION INTRAVENOUS at 09:18

## 2018-08-15 RX ADMIN — INSULIN DETEMIR 10 UNITS: 100 INJECTION, SOLUTION SUBCUTANEOUS at 23:44

## 2018-08-15 RX ADMIN — POTASSIUM CHLORIDE AND SODIUM CHLORIDE 200 ML/HR: 900; 150 INJECTION, SOLUTION INTRAVENOUS at 23:38

## 2018-08-15 RX ADMIN — ONDANSETRON 4 MG: 2 INJECTION INTRAMUSCULAR; INTRAVENOUS at 18:38

## 2018-08-15 RX ADMIN — ALBUMIN HUMAN: 0.05 INJECTION, SOLUTION INTRAVENOUS at 09:28

## 2018-08-15 RX ADMIN — ACETAMINOPHEN 1000 MG: 500 TABLET, FILM COATED ORAL at 06:58

## 2018-08-15 NOTE — ANESTHESIA PREPROCEDURE EVALUATION
Anesthesia Evaluation     Patient summary reviewed and Nursing notes reviewed                Airway   Mallampati: I  TM distance: >3 FB  Neck ROM: full  No difficulty expected  Dental      Pulmonary - negative pulmonary ROS   Cardiovascular     (+) hypertension,       Neuro/Psych- negative ROS  GI/Hepatic/Renal/Endo - negative ROS     Musculoskeletal (-) negative ROS    Abdominal    Substance History - negative use     OB/GYN negative ob/gyn ROS         Other                        Anesthesia Plan    ASA 2     general   (Tap)  intravenous induction   Anesthetic plan and risks discussed with patient.    Plan discussed with CRNA.

## 2018-08-15 NOTE — ANESTHESIA PROCEDURE NOTES
Airway  Urgency: elective    Date/Time: 8/15/2018 7:36 AM  End Time:8/15/2018 7:45 AM  Airway not difficult    General Information and Staff    Patient location during procedure: OR  CRNA: HAYLEY HENDRICKSON    Indications and Patient Condition  Indications for airway management: airway protection    Preoxygenated: yes  MILS not maintained throughout  Mask difficulty assessment: 1 - vent by mask    Final Airway Details  Final airway type: endotracheal airway      Successful airway: ETT  Cuffed: yes   Successful intubation technique: direct laryngoscopy  Facilitating devices/methods: anterior pressure/BURP and Bougie (used [facility])  Endotracheal tube insertion site: oral  Blade: Carlin  Blade size: #4  ETT size: 7.5 mm  Cormack-Lehane Classification: grade III - view of epiglottis only  Placement verified by: chest auscultation and capnometry   Measured from: lips  ETT to lips (cm): 23  Number of attempts at approach: 2    Additional Comments  Negative epigastric sounds, Breath sound equal bilaterally with symmetric chest rise and fall

## 2018-08-15 NOTE — ANESTHESIA POSTPROCEDURE EVALUATION
Patient: Tripp Lantigua    Procedure Summary     Date:  08/15/18 Room / Location:   [facility] /  [facility]    Anesthesia Start:  0733 Anesthesia Stop:      Procedures:       OPEN LOW ANTERIOR RESECTION WITH TAKEDOWN OF COLOVESICAL FISTULA, MOBILIZATION OF SPLENIC FLEXIURE, APPENDECTOMY, AND ILEOSTOMY (N/A Abdomen)      CYSTOSCOPY WITH BILATERAL URETERAL STENT PLACEMENT (Bilateral Urethra) Diagnosis:      Surgeon:  Daniel Cline MD Provider:      Anesthesia Type:  general ASA Status:  2          Anesthesia Type: general  Last vitals  BP   90/57   Temp   97.0   Pulse   69   Resp   18     SpO2   98%     Post Anesthesia Care and Evaluation    Patient location during evaluation: PACU  Patient participation: complete - patient participated  Level of consciousness: responsive to physical stimuli  Pain score: 0  Pain management: adequate  Airway patency: patent  Anesthetic complications: No anesthetic complications  PONV Status: none  Cardiovascular status: acceptable and stable  Respiratory status: nasal cannula, unassisted, acceptable, spontaneous ventilation and oral airway  Hydration status: acceptable

## 2018-08-15 NOTE — INTERVAL H&P NOTE
"Pre-Op H&P (See Recent Office Note Attached for Full H&P)    Chief complaint: abdominal pain    HPI:      Patient is a 53 y.o. male who presents with history of stenosis. Symptoms include abdominal pain, nausea, constipation and diarrhea that began about 3 months ago and occur intermittently.     Review of Systems:  General ROS:  no fever, chills, rashes, No change since last office visit  Cardiovascular ROS: no chest pain or dyspnea on exertion  Respiratory ROS: no cough, shortness of breath, or wheezing    Immunization History:   Influenza:  Oct. 2017  Pneumococcal:  denies  Tetanus:  <10 years    Meds:    Medications (Admitted on 8/15/2018)    Hospital Medications    acetaminophen (TYLENOL) tablet 1,000 mg     alvimopan (ENTEREG) capsule 12 mg     ertapenem (INVanz) 1 g/100 mL 0.9% NS VTB (mbp)     famotidine (PEPCID) tablet 20 mg     heparin (porcine) 5000 UNIT/ML injection 5,000 Units     lactated ringers infusion     lidocaine PF 1% (XYLOCAINE) injection 0.5 mL     meloxicam (MOBIC) tablet 15 mg     pregabalin (LYRICA) capsule 75 mg     Scopolamine (TRANSDERM-SCOP) 1.5 MG/3DAYS patch 1 patch     sodium chloride 0.9 % flush 1-10 mL    Outpatient Medications    amoxicillin (AMOXIL) 500 MG capsule     aspirin 81 MG chewable tablet     HYDROCODONE-IBUPROFEN PO     lisinopril (PRINIVIL,ZESTRIL) 10 MG tablet     Melatonin 10 MG tablet          Vital Signs:  /85 (BP Location: Right arm, Patient Position: Lying)   Pulse 81   Temp 97.3 °F (36.3 °C) (Temporal Artery )   Resp 18   Ht 182.9 cm (72\")   Wt 116 kg (255 lb)   SpO2 97%   BMI 34.58 kg/m²     Physical Exam:    CV:  S1S2 regular rate and rhythm, no murmur               Resp:  Clear to auscultation; respirations regular, even and unlabored      Cancer Staging (if applicable)  Cancer Patient: __ yes _x_no __unknown; If yes, clinical stage T:__ N:__M:__, stage group or __N/A    Assessment: Diverticulitis of large intestine    Plan: Open lower anterior " resection with takedown of colovesical fistula      DAMI Pope  8/15/2018   6:52 AM

## 2018-08-15 NOTE — ANESTHESIA PROCEDURE NOTES
Peripheral Block    Patient location during procedure: OR  Start time: 8/15/2018 7:36 AM  Stop time: 8/15/2018 7:44 AM  Reason for block: at surgeon's request and post-op pain management  Performed by  Anesthesiologist: CATRACHITO SAUL  Preanesthetic Checklist  Completed: patient identified, site marked, surgical consent, pre-op evaluation, timeout performed, IV checked, risks and benefits discussed and monitors and equipment checked  Prep:  Pt Position: supine  Sterile barriers:cap, gloves, sterile barriers and mask  Prep: ChloraPrep  Patient monitoring: blood pressure monitoring, continuous pulse oximetry and EKG  Procedure  Sedation:yes  Performed under: general  Guidance:ultrasound guided  Images:still images obtained    Laterality:Bilateral  Block Type:TAP  Injection Technique:single-shot  Needle Type:short-bevel and echogenic  Needle Gauge:20 G    Medications  Comment:Block Injection:  LA dose divided between Right and Left block       Adjuncts:  Decadron 4mg PSF, Buprenex 0.3mg (Per total volume of LA)  Local Injected:bupivacaine 0.25% Local Amount Injected:60mL  Post Assessment  Injection Assessment: negative aspiration for heme, incremental injection and no paresthesia on injection  Patient Tolerance:comfortable throughout block  Complications:no  Additional Notes      Under Ultrasound guidance, a BBraun 4inch 360 degree needle was advanced with Normal Saline hydro dissection of tissue.  The Internal Oblique and Transversus Abdominus muscles where visualized.  At or before the aponeurosis of Internal Oblique, local anesthetic spread was visualized in the Transversus Abdominus Plane. Injection was made incrementally with aspiration every 5 mls.  There was no  intravascular injection,  injection pressure was normal, there was no neural injection, and the procedure was completed without difficulty.  Thank You.

## 2018-08-15 NOTE — H&P
Admission HP     Patient Name: Tripp Lantigua  MRN: [id number]  : 1965  DOS: 8/15/2018    Attending: Daniel Cline MD    Primary Care Provider: Trung Roca MD      Patient Care Team:  Trung Roca MD as PCP - General (Internal Medicine)    Chief complaint:  Diverticulitis    Subjective   Patient is a 53 y.o. male presented for low anterior colon resection by Dr. Cline under GA. He tolerated surgery well and is admitted for further medical management. He had been taking antibiotics for weeks prior to surgery for diverticulitis.    PROCEDURE: Open low anterior resection with mobilization of splenic flexure and low colorectal anastomosis.  Placement of a Daniel-Carvajal drain.  Non-incidental appendectomy.  Placement of a loop ileostomy.  Placement of Seprafilm at the ileostomy.  Placement of Tisseel in the left upper quadrant and colorectal anastomosis.  Placement of bilateral ureteral catheters.  Placement of an omental flap.    Per Dr. Cline's note: (((53-year-old gentleman who has been dealing with recurrent bouts of diverticulitis for years.  However, since April of this year he's had worsening symptoms with ultimate urinary tract infection.  Subsequent colonoscopy CT scan shows a stricture in his colon and a colovesical fistula.  He was initially seen at [facility] and it was recommended he undergo low anterior resection to manage this.  He came to me for a second opinion and I have concurred.  I did talk with the patient about the possibility that he would need to have a diverting loop ileostomy given the degree of inflammation that may be found at the time of surgery.  He agreed to proceed.  Both he and his wife understood the risks, benefits, and alternatives.)))    When seen postop he is drowsy. His pain control is better after morphine. He denies nausea, shortness of breath or chest pain. No hx of DVT or PE.    ( Above is noted/ agree. Seen in his room afterwards.  "Still drowsy, having received pain medicine. Awakens intermittently, cooperative. Wife at bedside. No n/vom. C/o pain. )wy      Allergies:  No Known Allergies    Meds:  Prescriptions Prior to Admission   Medication Sig Dispense Refill Last Dose   • amoxicillin (AMOXIL) 500 MG capsule Take 500 mg by mouth 2 (Two) Times a Day.   8/14/2018 at 0900   • lisinopril (PRINIVIL,ZESTRIL) 10 MG tablet Take 10 mg by mouth Daily.   8/14/2018 at 2200   • aspirin 81 MG chewable tablet Chew 81 mg Daily.   8/13/2018   • HYDROCODONE-IBUPROFEN PO Take  by mouth As Needed.   8/11/2018   • Melatonin 10 MG tablet Take  by mouth As Needed.   8/13/2018       History:   Past Medical History:   Diagnosis Date   • Diverticulosis    • Hypertension    • IBS (irritable bowel syndrome)    • Kidney stone      Past Surgical History:   Procedure Laterality Date   • COLONOSCOPY     • JOINT REPLACEMENT Bilateral     hip     History reviewed. No pertinent family history.  Social History   Substance Use Topics   • Smoking status: Never Smoker   • Smokeless tobacco: Never Used   • Alcohol use Yes      Comment: 4x a week    He is  with no children. He is a teacher at [employer].    Review of Systems  All systems were reviewed and negative except for:  Gastrointestinal: postitive for  constipation and diarrhea    Vital Signs  /88 (BP Location: Left arm, Patient Position: Lying)   Pulse 72   Temp 97.5 °F (36.4 °C) (Oral)   Resp 22   Ht 182.9 cm (72\")   Wt 116 kg (255 lb)   SpO2 98%   BMI 34.58 kg/m²     Physical Exam:    General Appearance:    Alert, cooperative, in no acute distress   Head:    Normocephalic, without obvious abnormality, atraumatic   Eyes:            Lids and lashes normal, conjunctivae and sclerae normal, no   icterus, no pallor, corneas clear   Ears:    Ears appear intact with no abnormalities noted   Neck:   No adenopathy, supple, trachea midline, no thyromegaly, no     carotid bruit, no JVD   Lungs:     Clear to " auscultation,respirations regular, even and                   unlabored    Heart:    Regular rhythm and normal rate, normal S1 and S2, no            murmur, no gallop, no rub, no click   Abdomen:     Soft, mild expected tenderness. PATRICIA present. Midline CDI. Stoma pink, no output/ except small serous fluid.   Genitalia:    Deferred. Hernández- kamini UOP   Extremities:   Moves all extremities well, no edema, no cyanosis, no              redness   Pulses:   Pulses palpable and equal bilaterally   Skin:   No bleeding, bruising or rash   Neurologic:   Cranial nerves 2 - 12 grossly intact, sensation intact       Results from last 7 days  Lab Units 08/15/18  0920 08/10/18  0936   WBC 10*3/mm3 12.65* 6.99   HEMOGLOBIN g/dL 12.0* 13.1   HEMATOCRIT % 38.5* 42.0   PLATELETS 10*3/mm3 243 223       Results from last 7 days  Lab Units 08/10/18  0936   SODIUM mmol/L 141   POTASSIUM mmol/L 4.3   CHLORIDE mmol/L 109   CO2 mmol/L 30.0   BUN mg/dL 16   CREATININE mg/dL 0.82   CALCIUM mg/dL 9.2   GLUCOSE mg/dL 138*     Lab Results   Component Value Date    HGBA1C 6.70 (H) 08/10/2018       Assessment and Plan:   Principal Problem:    S/P LAR with loop ileostomy  Active Problems:    Diverticulitis    HTN (hypertension)    Prediabetes vs DM - based on A1c    Acute blood loss anemia, mild    Leukocytosis, likely reactive      Plan  1. Ambulation  2. Pain control-prns   3. IS-encourage  4. DVT proph- mechs/heparin  5. Bowel regimen- entereg  6. Resume home medications as appropriate  7. Monitor post-op labs  8. DC planning for home when evidence of return of bowel function  9. Diet, IVF per surgeon( will change to IVF without dextrose due to BG in 200s.)wy    HTN  - Hold lisinopril for now  - Monitor BP   - Holding parameters for BP meds  - Labetalol PRN for SBP>170    PreDM vs DM- based on A1C  - hgb A1c on 8/10/18  - Accuchecks ACHS with low dose SSI  - DM educator  ( one time levemir dose tonight)wy.    I have personally performed the  evaluation on this patient. My history is consistant  with HPI obtained. My exam finding are listed above. I have personally reviewed and discussed the above formulated treatment plan with pt , his wife and .  LARRY. Discussed with Dr. Cline.LARRY Wilson  08/15/18  3:13 PM

## 2018-08-15 NOTE — OP NOTE
Operative Note    PRE-OPERATIVE DIAGNOSIS: Diverticulitis with colovesical fistula  POST-OPERATIVE DIAGNOSIS: Same  PROCEEDURE: Open low anterior resection with mobilization of splenic flexure and low colorectal anastomosis.  Placement of a Daniel-Carvajal drain.  Non-incidental appendectomy.  Placement of a loop ileostomy.  Placement of Seprafilm at the ileostomy.  Placement of Tisseel in the left upper quadrant and colorectal anastomosis.  Placement of bilateral ureteral catheters.  Placement of an omental flap.    SURGEON: Dr. Daniel Cline M.D.    FIRST ASSISTANT: Shanae JUAREZ.    INDICATIONS: 53-year-old gentleman who has been dealing with recurrent bouts of diverticulitis for years.  However, since April of this year he's had worsening symptoms with ultimate urinary tract infection.  Subsequent colonoscopy CT scan shows a stricture in his colon and a colovesical fistula.  He was initially seen at [facility] and it was recommended he undergo low anterior resection to manage this.  He came to me for a second opinion and I have concurred.  I did talk with the patient about the possibility that he would need to have a diverting loop ileostomy given the degree of inflammation that may be found at the time of surgery.  He agreed to proceed.  Both he and his wife understood the risks, benefits, and alternatives.    OPERATIVE FINDINGS: At time of operation patient was found to have a markedly inflamed sigmoid colon that was stuck to the posterior wall of the bladder.  This inflammation extended up the descending colon and down along the mesal rectum and along the rectum anteriorly.  This made dissection very difficult and made finding a plane to develop the mesal rectal dissection very difficult.  Because of this difficulty, I had Dr. Shaka James come in and place ureteral catheters in place so that we could avoid injury to the ureters.  Both of these were identified and kept out of our field.  The  ureteral stents were removed at the end of procedure.  Patient had a long torso and it was difficult to mobilize the splenic flexure as the splenic flexure portion of the colon was stuck underneath the spleen.  This required that I opened up incision enough so that I could get the appropriate retraction to dissect the splenic flexure down.  We then performed a mid descending colon to rectal anastomosis taking care to have no twists or tension on the anastomosis.  Taking care to have good blood supply in both bowel segments.  Once the anastomosis was made I performed proctoscopy and we had no evidence of a leak on insufflation and a healthy staple line.  I did leave a Daniel-Carvajal drain because the staple lines did leak.  Because this anastomosis lies an infected field I did sprayed Tisseel over the anastomosis and I raised an omental flap that I placed between the prostate and the rectum, and I also felt it best to perform a diverting loop ileostomy which we did.  In creation of the ileostomy I placed Seprafilm around the stoma to help with future ileostomy takedown.  Note should be made that the tip of the appendix was caught up into the inflammatory mass of the sigmoid colon in the pelvis.  In removing the appendix from the inflammatory mass I felt it needed to be excised so as not to leave an open appendix in the abdomen.    PROCEDURE IN DETAIL: Patient was correctly identified and brought to the operative placed in the supine position on the OR table.  He underwent induction of anesthesia and was intubated.  He had a tap block, Hernández catheter, an OG tube placed for the case.  He was then prepped and draped over the abdomen in the usual sterile fashion.  An appropriate timeout was performed.    A low midline incision was then created.  Dissection was carried down through subcutaneous tissues to gain entrance into the abdomen.  Initial exploration was noted with the above findings.  We began with electrocautery  dissection of the sigmoid colon off the dome of the bladder and to raise it up into the midline.  In the process of doing this I felt patient needed to have ureteral catheters done and Shaka Gardner came in and placed down.  I could feel the stents within the retroperitoneum so that we could avoid injury to the ureter.  Once we had the colon up in the midline, I placed a double Leichter suture over the intramesenteric artery pedicle.  We then carried our dissection over the sacral promontory and attempted to develop the mesal rectal plane.  However, there was significant inflammation in the mesal rectum making this almost impossible.  However we did carry the dissection down posteriorly I did get into the superior hemorrhoidal vessels and these needed to be controlled with suture Leichter.  We then carried the dissection laterally and anteriorly as well.  Finally, I was able to feel soft rectum poppedup into our field.  We used a contour stapler to come across this.  It should be noted that the mouth of the pelvis was quite narrowed from the inflammatory process that was going on.  We did identify a fistula between the colon and the bladder during the course of our dissection and I used a stitch to suture off the colon so that it would not leak.  Once the rectum was stapled our attention was turned towards the mid descending colon with the colon was also soft and supple.  I used clamps and ties to divide the mesal colon.  We then used a contour stapler, cross the rectum after placing the anvil of a 31 CEEA stapler into the descending colon.  Once we used a contour stapler, cross the mid descending colon spike and the anvil was then advanced into our field.  The colon specimen was handed off the field.    We then turned our attention to mobilizing the splenic flexure which was very difficult.  This was primarily the case because the patient's splenic flexure lied underneath the spleen and was very high.  We had to open  the incision several centimeters above the umbilicus in order to get a good view.  Once we had the splenic flexure down we mobilized the omentum off of the distal transverse colon.  At this point we had enough length to create an end and anastomosis without tension or twists.  We performed end and anastomosis with a CEA stapler and then leak tested it without any evidence of leak.  It should be noted there was some leaking of the staple line and because of this we did leave a Daniel-Carvajal drain into the pelvis through a left lower quadrant stab wound.  Once the anastomosis was made the abdomen was thoroughly irrigated with 3 L of fluid and clear return.  It should be noted that any contamination was confined to the pelvis and minimal.  Once irrigation was finished, we used Tisseel to spray and a left upper quadrant.  We had left a gauze in that area and it was dry.  We also sprayed Tisseel around the posterior wall the bladder and the anastomosis.  Finally, I raised a pedicle of omentum and used a 30 stitch to tack this down into the low pelvis to separate my anastomosis from the inflamed field on the posterior wall of the bladder.      Once all of this was done I looked at the appendix and noted that it was stuck to a loop of small bowel and cut up an inflammatory mass that I had previously peeled off the colon.  In dividing the appendix off of the small bowel I noted that it had a hole in the distal tip.  For this reason I felt the best course of action was an appendectomy.  We proceeded with dissecting the mesal appendix and dividing the vessel and then placing a 2-0 Prolene pursestring stitch around the base of the cecum after we raise it up into the field.  I placed a Madhavi across the appendiceal stump and placed a tie beneath the Madhavi.  I used a knife to divide the appendix and then cauterized the base of it.  It was then dunked into the cecum and sutured closed with a pursestring I had previously placed.   Once this was done I was happy with it was replaced back into the abdomen.      I then developed a loop of small bowel that would be suitable for a loop ileostomy.  I felt the patient needed an ileostomy given the inflamed field and which I placed the colorectal anastomosis in, the 1200 cc of blood that we lost for this case, and the significant amount of inflammation within the lower pelvis in general.  We created a turbine through the right rectus muscle.  Small bowel was delivered onto the abdomen after placement of Seprafilm around the small bowel.  It was anchored in place with a 16 Vietnamese red rubber catheter.      At this point the abdomen was closed with a #1 looped PDS.  The wound was irrigated.  We did close Tabitha's fascia with 2-0 Vicryl sutures we used a 3-0 Monocryl stitch to close the skin and a perenio wound dressing placed.  Daniel-Carvajal drain was secured with a drain stitch.  Once a midline incision was closed the ileostomy was opened and matured in a Christina technique over the red rubber catheter and an appliance placed.    At the end of the case the ureteral stents were removed.  A Hernández catheter was replaced.  The patient was awakened extubated and brought to recovery room in good condition.      Daniel Cline MD  08/15/18  11:22 AM

## 2018-08-15 NOTE — OP NOTE
Preoperative diagnosis: Sigmoid diverticulitis  Postoperative diagnosis: Same  Procedure performed cystoscopy bilateral ureteral catheterization  Surgeon: Kendra  Anesthesia: Gen.  Indications: This is a 53-year-old white male who is already asleep and having abdominal exploration with low anterior resection for severe diverticular disease.  That operation is being performed by Dr. Cline and is in progress.  I have been asked to see for intraoperative consultation for ureteral catheter insertion.    Upper description: Patient was already asleep and in the lithotomy position when I entered the operating theater.  His penis had a Hernández catheter indwelling was removed.  I then used the 21 Korean [facility] panendoscope under video cystoscopy.  The urethra suspected no be normal.  The prostate was small and obstructing the bladder was entered.  Orifices were in their normal location and effluxing clear urine.  The bladder was inspected circumferentially with the 30 and 70° lenses.  There were no lesions tumors trabeculations fistula or other abnormalities.  I then sequentially cannulated each orifice with a 6 Korean ureteral catheter on the left side and a 5 Korean ureteral catheter on the right side both of these were passed blindly up to the kidneys without difficulty.  They were left in position and sutured to a 16 Korean Pa catheter which I inserted after removing his cystoscope.  These will be left in place for ureteral identification to be removed in a few days when the Hernández catheter comes out.    There were no complications    The hospital dictation system is broken so this was done on a voice recognition system.  There may be significant transcription errors.

## 2018-08-15 NOTE — PLAN OF CARE
Problem: Patient Care Overview  Goal: Plan of Care Review  Outcome: Ongoing (interventions implemented as appropriate)   08/15/18 1905   Coping/Psychosocial   Plan of Care Reviewed With patient;spouse   Plan of Care Review   Progress improving     Goal: Discharge Needs Assessment  Outcome: Ongoing (interventions implemented as appropriate)   08/15/18 1400 08/15/18 1905   Discharge Needs Assessment   Readmission Within the Last 30 Days --  no previous admission in last 30 days   Concerns to be Addressed --  denies needs/concerns at this time   Patient/Family Anticipates Transition to home with family --    Patient/Family Anticipated Services at Transition --  none   Transportation Concerns --  car, none   Transportation Anticipated car, drives self --    Anticipated Changes Related to Illness --  none   Equipment Needed After Discharge --  colostomy/ostomy supplies   Disability   Equipment Currently Used at Home --  none       Problem: Pain, Acute (Adult)  Goal: Identify Related Risk Factors and Signs and Symptoms  Outcome: Ongoing (interventions implemented as appropriate)   08/15/18 1905   Pain, Acute (Adult)   Related Risk Factors (Acute Pain) surgery      08/15/18 1905   Pain, Acute (Adult)   Related Risk Factors (Acute Pain) surgery   Signs and Symptoms (Acute Pain) verbalization of pain descriptors     Goal: Acceptable Pain Control/Comfort Level  Outcome: Ongoing (interventions implemented as appropriate)   08/15/18 1905   Pain, Acute (Adult)   Acceptable Pain Control/Comfort Level making progress toward outcome

## 2018-08-16 LAB
ANION GAP SERPL CALCULATED.3IONS-SCNC: 4 MMOL/L (ref 3–11)
BASOPHILS # BLD AUTO: 0 10*3/MM3 (ref 0–0.2)
BASOPHILS NFR BLD AUTO: 0 % (ref 0–1)
BUN BLD-MCNC: 10 MG/DL (ref 9–23)
BUN/CREAT SERPL: 12.3 (ref 7–25)
CALCIUM SPEC-SCNC: 8 MG/DL (ref 8.7–10.4)
CHLORIDE SERPL-SCNC: 105 MMOL/L (ref 99–109)
CO2 SERPL-SCNC: 26 MMOL/L (ref 20–31)
CREAT BLD-MCNC: 0.81 MG/DL (ref 0.6–1.3)
DEPRECATED RDW RBC AUTO: 44.2 FL (ref 37–54)
EOSINOPHIL # BLD AUTO: 0 10*3/MM3 (ref 0–0.3)
EOSINOPHIL NFR BLD AUTO: 0 % (ref 0–3)
ERYTHROCYTE [DISTWIDTH] IN BLOOD BY AUTOMATED COUNT: 14.3 % (ref 11.3–14.5)
GFR SERPL CREATININE-BSD FRML MDRD: 99 ML/MIN/1.73
GLUCOSE BLD-MCNC: 165 MG/DL (ref 70–100)
GLUCOSE BLDC GLUCOMTR-MCNC: 115 MG/DL (ref 70–130)
GLUCOSE BLDC GLUCOMTR-MCNC: 152 MG/DL (ref 70–130)
GLUCOSE BLDC GLUCOMTR-MCNC: 156 MG/DL (ref 70–130)
GLUCOSE BLDC GLUCOMTR-MCNC: 174 MG/DL (ref 70–130)
HCT VFR BLD AUTO: 34.5 % (ref 38.9–50.9)
HGB BLD-MCNC: 10.7 G/DL (ref 13.1–17.5)
IMM GRANULOCYTES # BLD: 0.02 10*3/MM3 (ref 0–0.03)
IMM GRANULOCYTES NFR BLD: 0.1 % (ref 0–0.6)
LYMPHOCYTES # BLD AUTO: 1.18 10*3/MM3 (ref 0.6–4.8)
LYMPHOCYTES NFR BLD AUTO: 8.2 % (ref 24–44)
MAGNESIUM SERPL-MCNC: 2.1 MG/DL (ref 1.3–2.7)
MCH RBC QN AUTO: 26.6 PG (ref 27–31)
MCHC RBC AUTO-ENTMCNC: 31 G/DL (ref 32–36)
MCV RBC AUTO: 85.6 FL (ref 80–99)
MONOCYTES # BLD AUTO: 1.14 10*3/MM3 (ref 0–1)
MONOCYTES NFR BLD AUTO: 7.9 % (ref 0–12)
NEUTROPHILS # BLD AUTO: 12.09 10*3/MM3 (ref 1.5–8.3)
NEUTROPHILS NFR BLD AUTO: 83.9 % (ref 41–71)
PLATELET # BLD AUTO: 214 10*3/MM3 (ref 150–450)
PMV BLD AUTO: 11.9 FL (ref 6–12)
POTASSIUM BLD-SCNC: 4.7 MMOL/L (ref 3.5–5.5)
RBC # BLD AUTO: 4.03 10*6/MM3 (ref 4.2–5.76)
SODIUM BLD-SCNC: 135 MMOL/L (ref 132–146)
WBC NRBC COR # BLD: 14.41 10*3/MM3 (ref 3.5–10.8)

## 2018-08-16 PROCEDURE — 83735 ASSAY OF MAGNESIUM: CPT | Performed by: COLON & RECTAL SURGERY

## 2018-08-16 PROCEDURE — 82962 GLUCOSE BLOOD TEST: CPT

## 2018-08-16 PROCEDURE — G0108 DIAB MANAGE TRN  PER INDIV: HCPCS | Performed by: REGISTERED NURSE

## 2018-08-16 PROCEDURE — 80048 BASIC METABOLIC PNL TOTAL CA: CPT | Performed by: COLON & RECTAL SURGERY

## 2018-08-16 PROCEDURE — 25010000002 HEPARIN (PORCINE) PER 1000 UNITS: Performed by: COLON & RECTAL SURGERY

## 2018-08-16 PROCEDURE — 25010000002 PIPERACILLIN SOD-TAZOBACTAM PER 1 G: Performed by: COLON & RECTAL SURGERY

## 2018-08-16 PROCEDURE — 85025 COMPLETE CBC W/AUTO DIFF WBC: CPT | Performed by: COLON & RECTAL SURGERY

## 2018-08-16 PROCEDURE — 25810000003 SODIUM CHLORIDE 0.9 % WITH KCL 20 MEQ 20-0.9 MEQ/L-% SOLUTION: Performed by: INTERNAL MEDICINE

## 2018-08-16 PROCEDURE — 25010000002 ONDANSETRON PER 1 MG: Performed by: NURSE PRACTITIONER

## 2018-08-16 PROCEDURE — 25010000002 MORPHINE SULFATE (PF) 2 MG/ML SOLUTION: Performed by: COLON & RECTAL SURGERY

## 2018-08-16 PROCEDURE — 25810000003 SODIUM CHLORIDE 0.9 % WITH KCL 20 MEQ 20-0.9 MEQ/L-% SOLUTION: Performed by: COLON & RECTAL SURGERY

## 2018-08-16 RX ORDER — DEXTROSE AND SODIUM CHLORIDE 5; .45 G/100ML; G/100ML
100 INJECTION, SOLUTION INTRAVENOUS CONTINUOUS
Status: DISCONTINUED | OUTPATIENT
Start: 2018-08-16 | End: 2018-08-16

## 2018-08-16 RX ORDER — OXYCODONE HYDROCHLORIDE AND ACETAMINOPHEN 5; 325 MG/1; MG/1
1 TABLET ORAL EVERY 4 HOURS PRN
Status: DISCONTINUED | OUTPATIENT
Start: 2018-08-16 | End: 2018-08-18 | Stop reason: HOSPADM

## 2018-08-16 RX ORDER — LISINOPRIL 10 MG/1
10 TABLET ORAL DAILY
Status: DISCONTINUED | OUTPATIENT
Start: 2018-08-16 | End: 2018-08-17

## 2018-08-16 RX ADMIN — TAZOBACTAM SODIUM AND PIPERACILLIN SODIUM 3.38 G: 375; 3 INJECTION, SOLUTION INTRAVENOUS at 13:30

## 2018-08-16 RX ADMIN — ONDANSETRON 4 MG: 2 INJECTION INTRAMUSCULAR; INTRAVENOUS at 02:55

## 2018-08-16 RX ADMIN — ACETAMINOPHEN 1000 MG: 500 TABLET, FILM COATED ORAL at 13:30

## 2018-08-16 RX ADMIN — OXYCODONE HYDROCHLORIDE AND ACETAMINOPHEN 1 TABLET: 5; 325 TABLET ORAL at 06:45

## 2018-08-16 RX ADMIN — POTASSIUM CHLORIDE AND SODIUM CHLORIDE 100 ML/HR: 900; 150 INJECTION, SOLUTION INTRAVENOUS at 20:04

## 2018-08-16 RX ADMIN — INSULIN LISPRO 2 UNITS: 100 INJECTION, SOLUTION INTRAVENOUS; SUBCUTANEOUS at 20:16

## 2018-08-16 RX ADMIN — OXYCODONE HYDROCHLORIDE AND ACETAMINOPHEN 1 TABLET: 5; 325 TABLET ORAL at 10:21

## 2018-08-16 RX ADMIN — HEPARIN SODIUM 5000 UNITS: 5000 INJECTION, SOLUTION INTRAVENOUS; SUBCUTANEOUS at 13:31

## 2018-08-16 RX ADMIN — INSULIN LISPRO 2 UNITS: 100 INJECTION, SOLUTION INTRAVENOUS; SUBCUTANEOUS at 13:30

## 2018-08-16 RX ADMIN — TAZOBACTAM SODIUM AND PIPERACILLIN SODIUM 3.38 G: 375; 3 INJECTION, SOLUTION INTRAVENOUS at 20:04

## 2018-08-16 RX ADMIN — MORPHINE SULFATE 2 MG: 2 INJECTION, SOLUTION INTRAMUSCULAR; INTRAVENOUS at 16:41

## 2018-08-16 RX ADMIN — ALVIMOPAN 12 MG: 12 CAPSULE ORAL at 20:05

## 2018-08-16 RX ADMIN — ALVIMOPAN 12 MG: 12 CAPSULE ORAL at 08:26

## 2018-08-16 RX ADMIN — DIAZEPAM 5 MG: 5 TABLET ORAL at 20:05

## 2018-08-16 RX ADMIN — LISINOPRIL 10 MG: 10 TABLET ORAL at 20:14

## 2018-08-16 RX ADMIN — ONDANSETRON 4 MG: 2 INJECTION INTRAMUSCULAR; INTRAVENOUS at 16:41

## 2018-08-16 RX ADMIN — OXYCODONE HYDROCHLORIDE AND ACETAMINOPHEN 1 TABLET: 5; 325 TABLET ORAL at 20:05

## 2018-08-16 RX ADMIN — TAZOBACTAM SODIUM AND PIPERACILLIN SODIUM 3.38 G: 375; 3 INJECTION, SOLUTION INTRAVENOUS at 08:26

## 2018-08-16 RX ADMIN — OXYCODONE HYDROCHLORIDE AND ACETAMINOPHEN 1 TABLET: 5; 325 TABLET ORAL at 02:55

## 2018-08-16 RX ADMIN — POTASSIUM CHLORIDE AND SODIUM CHLORIDE 200 ML/HR: 900; 150 INJECTION, SOLUTION INTRAVENOUS at 04:10

## 2018-08-16 RX ADMIN — ONDANSETRON 4 MG: 2 INJECTION INTRAMUSCULAR; INTRAVENOUS at 10:23

## 2018-08-16 RX ADMIN — OXYCODONE HYDROCHLORIDE AND ACETAMINOPHEN 1 TABLET: 5; 325 TABLET ORAL at 15:23

## 2018-08-16 RX ADMIN — HEPARIN SODIUM 5000 UNITS: 5000 INJECTION, SOLUTION INTRAVENOUS; SUBCUTANEOUS at 20:05

## 2018-08-16 RX ADMIN — INSULIN LISPRO 2 UNITS: 100 INJECTION, SOLUTION INTRAVENOUS; SUBCUTANEOUS at 08:27

## 2018-08-16 RX ADMIN — HEPARIN SODIUM 5000 UNITS: 5000 INJECTION, SOLUTION INTRAVENOUS; SUBCUTANEOUS at 06:01

## 2018-08-16 NOTE — PROGRESS NOTES
"[facility] ([facility]    POD # 1   Length of stay: 1 days    Subjective:  Stable Post - Op course.    Vital Signs:  Blood pressure 138/89, pulse 71, temperature 98.5 °F (36.9 °C), temperature source Oral, resp. rate 18, height 182.9 cm (72\"), weight 116 kg (255 lb), SpO2 96 %.    Labs past 24 hours:  Have been reviewed by myself.    I/O last shift:  Have been reviewed by myself.    Exam:  Abdomen soft with mild distention.  Dressings Clean and Intact.  Stoma pink, but not yet functioning.  Pt. Is passing old stool per rectum from his previously obstructed colon.  Labs this am look good.          Assessment:  S/p resection of diverticulitis.    Plan:    D/C yi catheter if urine output good.  Oral pain meds. and limit IV narcotics.  Advance Diet as tolerated.  Up and walking 5 times per day.  Incentive spirometry  Pt. Will need IV abx while in the hospital and plan to transition to oral upon discharge.    Daniel Cline MD  08/16/18  6:49 AM  "

## 2018-08-16 NOTE — PROGRESS NOTES
" IM progress note      Tripp Lantigua  [id number]  1965     LOS: 1 day     Attending: Daniel Cline MD    Primary Care Provider: Trung Roca MD      Chief Complaint/Reason for visit:  Diverticulitis    Subjective   Doing ok. Moderate abd pain, mild nausea. Tolerated soft breakfast, no vomiting. Ambulating in halls. Liquid output from stoma. Has not voided since yi out. Denies f/c/sob/cp.  ( good progress so far. Taking po in moderation. Ambulating often. )wy  Objective     Vital Signs  Blood pressure 133/71, pulse 71, temperature 97.6 °F (36.4 °C), temperature source Oral, resp. rate 18, height 182.9 cm (72\"), weight 116 kg (255 lb), SpO2 96 %.  Temp (24hrs), Av.6 °F (36.4 °C), Min:97 °F (36.1 °C), Max:98.6 °F (37 °C)      Nutrition: soft    Respiratory: RA    Physical Exam:     General Appearance:    Alert, cooperative, in no acute distress   Head:    Normocephalic, without obvious abnormality, atraumatic    Lungs:     Normal effort, symmetric chest rise, no crepitus, clear to      auscultation bilaterally             Heart:    Regular rhythm and normal rate, normal S1 and S2   Abdomen:     Soft, mildly distended. PATRICIA present. Midline CDI. Stoma pink, liquid output.   Extremities:   No clubbing, cyanosis or edema.  No deformities.    Pulses:   Pulses palpable and equal bilaterally   Skin:   No bleeding, bruising or rash   Neurologic:   Moves all extremities with no obvious focal motor deficit.  Cranial nerves 2 - 12 grossly intact     Results Review:     I reviewed the patient's new clinical results.     Results from last 7 days  Lab Units 18  0441 08/15/18  1747 08/15/18  0920   WBC 10*3/mm3 14.41* 15.11* 12.65*   HEMOGLOBIN g/dL 10.7* 12.0* 12.0*   HEMATOCRIT % 34.5* 39.4 38.5*   PLATELETS 10*3/mm3 214 212 243       Results from last 7 days  Lab Units 18  0441 08/10/18  0936   SODIUM mmol/L 135 141   POTASSIUM mmol/L 4.7 4.3   CHLORIDE mmol/L 105 109   CO2 mmol/L 26.0 30.0 "   BUN mg/dL 10 16   CREATININE mg/dL 0.81 0.82   CALCIUM mg/dL 8.0* 9.2   GLUCOSE mg/dL 165* 138*     Results for IRMA WARREN (MRN [id number]) as of 8/16/2018 12:00   Ref. Range 8/15/2018 21:05 8/16/2018 04:41 8/16/2018 07:49 8/16/2018 11:40   Glucose Latest Ref Range: 70 - 130 mg/dL 254 (H) 165 (H) 156 (H) 152 (H)     Results for IRMA WARREN (MRN [id number]) as of 8/16/2018 12:00   Ref. Range 8/16/2018 04:41   Magnesium Latest Ref Range: 1.3 - 2.7 mg/dL 2.1     I reviewed the patient's new imaging including images and reports.    All medications reviewed.     acetaminophen 1,000 mg Oral Q6H   alvimopan 12 mg Oral BID   heparin (porcine) 5,000 Units Subcutaneous Q8H   insulin lispro 0-7 Units Subcutaneous 4x Daily With Meals & Nightly   piperacillin-tazobactam 3.375 g Intravenous Q6H       Assessment/Plan   Principal Problem:    S/P LAR with loop ileostomy  Active Problems:    Diverticulitis    HTN (hypertension)    Prediabetes vs DM - based on A1c    Acute blood loss anemia, mild    Leukocytosis, likely reactive      Plan  1. Ambulation  2. Pain control-prns   3. IS-encouraged  4. DVT proph- mechs/heparin  5. Bowel regimen- entereg  6. Diet, PO soft diet as tolerated. IVF per surgery  7. Monitor post-op labs  8. DC planning for home     HTN  - Hold lisinopril for now/ Resumed 8/16. As BP trending up.wy  - Monitor BP   - Holding parameters for BP meds  - Labetalol PRN for SBP>170     PreDM vs DM- based on A1C  - hgb A1c on 8/10/18  - Accuchecks ACHS with low dose SSI  - Seen by DM educator 8/16  - Consider repeat levemir this evening?    Per Dr. Cline: Pt. Will need IV abx while in the hospital and plan to transition to oral upon discharge    I have personally performed the evaluation on this patient. My history is consistant  with HPI obtained. My exam finding are listed above. I have personally reviewed and discussed the above formulated treatment plan with patient and  APRN.wy.      Brandy Ritchie,  LARRY  08/16/18  11:56 AM

## 2018-08-16 NOTE — CONSULTS
"Diabetes Education  Assessment/Teaching    Patient Name:  Tripp Lantigua  YOB: 1965  MRN: [id number]  Admit Date:  8/15/2018      Assessment Date:  8/16/2018    Most Recent Value   General Information    Referral From:  A1c, Blood glucose, MD order [6.7%]   Height  182.9 cm (72\")   Height Method  Stated   Weight  116 kg (255 lb)   Weight Method  Stated   Pregnancy Assessment   Diabetes History   What type of diabetes do you have?  Type 2   Length of Diabetes Diagnosis  Newly diagnosed <6 months [pt said he does not have diabetes and insist this is temporary. he does admit he has a history of pre diabetes]   Current DM knowledge  fair   Have you had diabetes education/teaching in the past?  no   Do you test your blood sugar at home?  no   Education Preferences   What areas of diabetes would you like to learn about?  avoiding high blood sugar   Nutrition Information   When was the last time you saw a dietician?  never   Assessment Topics   Healthy Eating - Assessment  Needs education   Being Active - Assessment  Needs education   Taking Medication - Assessment  Needs education   Problem Solving - Assessment  Needs education   Reducing Risk - Assessment  Needs education   Healthy Coping - Assessment  Needs education   Monitoring - Assessment  Needs education   DM Goals            Most Recent Value   DM Education Needs   Meter  Meter provided   Meter Type  Contour   Frequency of Testing  Daily [urged him to test 1-2 times a day and f/u with PCP to determine if need further and regimen]   Blood Glucose Target  -- [provided handout of ADA suggested glucose goals. urged to call if >180 ac x2 for wound healing]   Medication  -- [explained insulin regimen IP. Provided and discussed handout about current US oral diabetes medications]   Problem Solving  Hypoglycemia, Hyperglycemia, Sick days, Signs, Symptoms, Treatment [discussed stress induced hyperglycemia ]   Reducing Risks  A1C testing   Physical Activity " Frequency  Discussed exercise importance   Healthy Coping  Appropriate   Discharge Plan  Home   Motivation  Engaged, Strong [wife was active learner,  Mr. lantigua was concerned if he would remember due to his pain meds]   Teaching Method  Explanation, Discussion, Demonstration, Handouts, Teach back   Patient Response  Verbalized understanding, Demonstrates adequately            Other Comments:  Mr. Lantigua was seen for new diagnosis of type 2 diabetes. Discussed and taught patient and his wife about type 2 diabetes self-management, risk factors, and importance of blood glucose control to reduce complications. Target blood glucose readings and A1c goals per ADA were reviewed. Reviewed with patient current A1c and discussed its significance. He said he has a history of pre diabetes and feels his current hyperglycemia is only a result of current illness. Urged him to follow up with PCP soon for further instructions and if glucose >180 mg/dl ac x2 to promote healing. Signs, symptoms and treatment of hyperglycemia and hypoglycemia were discussed. Lifestyle changes such as physical activity with MD approval and healthy eating were encouraged.  Patient provided and demonstrated blood glucose meter.  He asked his wife to participate in the meter education because he was afraid he would not remember due to pain med. She was able to return demonstration appropriately. Patient was encouraged to keep record of blood glucose readings to take to follow up appointment with PCP.  OP education was also encouraged for additional education once discharged.          Electronically signed by:  Merry Rogers RN  08/16/18 10:29 AM

## 2018-08-16 NOTE — PROGRESS NOTES
Discharge Planning Assessment  [facility]     Patient Name: Tripp Lantigua  MRN: [id number]  Today's Date: 8/16/2018    Admit Date: 8/15/2018          Discharge Needs Assessment     Row Name 08/16/18 1016       Living Environment    Lives With spouse    Current Living Arrangements home/apartment/condo    Living Arrangement Comments Spouse can assist after DC as needed.       Discharge Needs Assessment    Equipment Currently Used at Home none    Equipment Needed After Discharge none    Discharge Coordination/Progress No HH involved            Discharge Plan     Row Name 08/16/18 1017       Plan    Plan Home at DC    Patient/Family in Agreement with Plan yes    Plan Comments I spoke with the pt and spouse. No DC needs at this time. Please send ostomy supplies home with the pt at DC.    Row Name 08/16/18 0817       Plan    Final Discharge Disposition Code 01 - home or self-care        Destination     No service coordination in this encounter.      Durable Medical Equipment     No service coordination in this encounter.      Dialysis/Infusion     No service coordination in this encounter.      Home Medical Care     No service coordination in this encounter.      Social Care     No service coordination in this encounter.        Expected Discharge Date and Time     Expected Discharge Date Expected Discharge Time    Aug 18, 2018               Demographic Summary    No documentation.           Functional Status     Row Name 08/16/18 1016       Functional Status    Usual Activity Tolerance excellent       Functional Status, IADL    Medications independent    Meal Preparation independent    Housekeeping independent    Laundry independent    Shopping independent            Psychosocial    No documentation.           Abuse/Neglect    No documentation.           Legal    No documentation.           Substance Abuse    No documentation.           Patient Forms    No documentation.         Tayla Mendoza RN

## 2018-08-16 NOTE — PLAN OF CARE
Problem: Patient Care Overview  Goal: Plan of Care Review  Outcome: Ongoing (interventions implemented as appropriate)   08/16/18 0810   Coping/Psychosocial   Plan of Care Reviewed With patient;spouse   Plan of Care Review   Progress no change   OTHER   Outcome Summary WOC nurse consulted for new loop ileostomy done 8/15/18 per Dr. Cline for diverticulitis. Appliance intact with no leakage at this time. Stoma red and moist; bridge in place; small amount serosanguinous output. Education folder given to pt and wife; extensive education performed including diet, fluids, activity, lifting restrictions, bathing, stomal care, appliance choices and ordering of supplies. WOC nurse will f/u. Please contact WOC nurse as needed for concerns.

## 2018-08-16 NOTE — PLAN OF CARE
Problem: Patient Care Overview  Goal: Plan of Care Review  Outcome: Ongoing (interventions implemented as appropriate)   08/16/18 043   Coping/Psychosocial   Plan of Care Reviewed With patient;spouse   Plan of Care Review   Progress improving   OTHER   Outcome Summary Patient able to walk with stand by assist multiple times this shift. Patient tolerating clear liquids but did require one dose of zofran. Patient responding well to oral pain medications. Patient has support of spouse. Patient eager to learn how to care for ileostomy and future medical plan.     Goal: Discharge Needs Assessment  Outcome: Ongoing (interventions implemented as appropriate)      Problem: Pain, Acute (Adult)  Goal: Identify Related Risk Factors and Signs and Symptoms  Outcome: Ongoing (interventions implemented as appropriate)   08/16/18 4856   Pain, Acute (Adult)   Related Risk Factors (Acute Pain) surgery;patient perception   Signs and Symptoms (Acute Pain) verbalization of pain descriptors;nausea/vomiting/anorexia     Goal: Acceptable Pain Control/Comfort Level  Outcome: Ongoing (interventions implemented as appropriate)   08/16/18 5658   Pain, Acute (Adult)   Acceptable Pain Control/Comfort Level making progress toward outcome

## 2018-08-17 LAB
ANION GAP SERPL CALCULATED.3IONS-SCNC: 2 MMOL/L (ref 3–11)
BASOPHILS # BLD AUTO: 0.01 10*3/MM3 (ref 0–0.2)
BASOPHILS NFR BLD AUTO: 0.1 % (ref 0–1)
BUN BLD-MCNC: 8 MG/DL (ref 9–23)
BUN/CREAT SERPL: 10.1 (ref 7–25)
CALCIUM SPEC-SCNC: 8.2 MG/DL (ref 8.7–10.4)
CHLORIDE SERPL-SCNC: 108 MMOL/L (ref 99–109)
CO2 SERPL-SCNC: 27 MMOL/L (ref 20–31)
CREAT BLD-MCNC: 0.79 MG/DL (ref 0.6–1.3)
DEPRECATED RDW RBC AUTO: 45.8 FL (ref 37–54)
EOSINOPHIL # BLD AUTO: 0.09 10*3/MM3 (ref 0–0.3)
EOSINOPHIL NFR BLD AUTO: 0.9 % (ref 0–3)
ERYTHROCYTE [DISTWIDTH] IN BLOOD BY AUTOMATED COUNT: 14.7 % (ref 11.3–14.5)
GFR SERPL CREATININE-BSD FRML MDRD: 102 ML/MIN/1.73
GLUCOSE BLD-MCNC: 120 MG/DL (ref 70–100)
GLUCOSE BLDC GLUCOMTR-MCNC: 104 MG/DL (ref 70–130)
GLUCOSE BLDC GLUCOMTR-MCNC: 109 MG/DL (ref 70–130)
GLUCOSE BLDC GLUCOMTR-MCNC: 113 MG/DL (ref 70–130)
GLUCOSE BLDC GLUCOMTR-MCNC: 118 MG/DL (ref 70–130)
HCT VFR BLD AUTO: 29.9 % (ref 38.9–50.9)
HGB BLD-MCNC: 9.3 G/DL (ref 13.1–17.5)
IMM GRANULOCYTES # BLD: 0.04 10*3/MM3 (ref 0–0.03)
IMM GRANULOCYTES NFR BLD: 0.4 % (ref 0–0.6)
LYMPHOCYTES # BLD AUTO: 1.02 10*3/MM3 (ref 0.6–4.8)
LYMPHOCYTES NFR BLD AUTO: 9.7 % (ref 24–44)
MAGNESIUM SERPL-MCNC: 2 MG/DL (ref 1.3–2.7)
MCH RBC QN AUTO: 26.7 PG (ref 27–31)
MCHC RBC AUTO-ENTMCNC: 31.1 G/DL (ref 32–36)
MCV RBC AUTO: 85.9 FL (ref 80–99)
MONOCYTES # BLD AUTO: 0.8 10*3/MM3 (ref 0–1)
MONOCYTES NFR BLD AUTO: 7.6 % (ref 0–12)
NEUTROPHILS # BLD AUTO: 8.6 10*3/MM3 (ref 1.5–8.3)
NEUTROPHILS NFR BLD AUTO: 81.7 % (ref 41–71)
PLATELET # BLD AUTO: 167 10*3/MM3 (ref 150–450)
PMV BLD AUTO: 11.9 FL (ref 6–12)
POTASSIUM BLD-SCNC: 4.3 MMOL/L (ref 3.5–5.5)
RBC # BLD AUTO: 3.48 10*6/MM3 (ref 4.2–5.76)
SODIUM BLD-SCNC: 137 MMOL/L (ref 132–146)
WBC NRBC COR # BLD: 10.52 10*3/MM3 (ref 3.5–10.8)

## 2018-08-17 PROCEDURE — 25010000002 HEPARIN (PORCINE) PER 1000 UNITS: Performed by: COLON & RECTAL SURGERY

## 2018-08-17 PROCEDURE — 25010000002 ONDANSETRON PER 1 MG: Performed by: NURSE PRACTITIONER

## 2018-08-17 PROCEDURE — 25010000002 MORPHINE SULFATE (PF) 2 MG/ML SOLUTION: Performed by: COLON & RECTAL SURGERY

## 2018-08-17 PROCEDURE — 25810000003 SODIUM CHLORIDE 0.9 % WITH KCL 20 MEQ 20-0.9 MEQ/L-% SOLUTION: Performed by: COLON & RECTAL SURGERY

## 2018-08-17 PROCEDURE — 85025 COMPLETE CBC W/AUTO DIFF WBC: CPT | Performed by: COLON & RECTAL SURGERY

## 2018-08-17 PROCEDURE — 82962 GLUCOSE BLOOD TEST: CPT

## 2018-08-17 PROCEDURE — 80048 BASIC METABOLIC PNL TOTAL CA: CPT | Performed by: COLON & RECTAL SURGERY

## 2018-08-17 PROCEDURE — 25010000002 PIPERACILLIN SOD-TAZOBACTAM PER 1 G: Performed by: COLON & RECTAL SURGERY

## 2018-08-17 PROCEDURE — 83735 ASSAY OF MAGNESIUM: CPT | Performed by: COLON & RECTAL SURGERY

## 2018-08-17 RX ORDER — LISINOPRIL 10 MG/1
10 TABLET ORAL NIGHTLY
Status: DISCONTINUED | OUTPATIENT
Start: 2018-08-17 | End: 2018-08-18 | Stop reason: HOSPADM

## 2018-08-17 RX ADMIN — TAZOBACTAM SODIUM AND PIPERACILLIN SODIUM 3.38 G: 375; 3 INJECTION, SOLUTION INTRAVENOUS at 09:02

## 2018-08-17 RX ADMIN — POTASSIUM CHLORIDE AND SODIUM CHLORIDE 100 ML/HR: 900; 150 INJECTION, SOLUTION INTRAVENOUS at 06:50

## 2018-08-17 RX ADMIN — OXYCODONE HYDROCHLORIDE AND ACETAMINOPHEN 1 TABLET: 5; 325 TABLET ORAL at 05:29

## 2018-08-17 RX ADMIN — HEPARIN SODIUM 5000 UNITS: 5000 INJECTION, SOLUTION INTRAVENOUS; SUBCUTANEOUS at 21:24

## 2018-08-17 RX ADMIN — TAZOBACTAM SODIUM AND PIPERACILLIN SODIUM 3.38 G: 375; 3 INJECTION, SOLUTION INTRAVENOUS at 21:25

## 2018-08-17 RX ADMIN — OXYCODONE HYDROCHLORIDE AND ACETAMINOPHEN 1 TABLET: 5; 325 TABLET ORAL at 09:23

## 2018-08-17 RX ADMIN — ONDANSETRON 4 MG: 2 INJECTION INTRAMUSCULAR; INTRAVENOUS at 11:56

## 2018-08-17 RX ADMIN — MORPHINE SULFATE 2 MG: 2 INJECTION, SOLUTION INTRAMUSCULAR; INTRAVENOUS at 00:08

## 2018-08-17 RX ADMIN — HEPARIN SODIUM 5000 UNITS: 5000 INJECTION, SOLUTION INTRAVENOUS; SUBCUTANEOUS at 05:29

## 2018-08-17 RX ADMIN — HEPARIN SODIUM 5000 UNITS: 5000 INJECTION, SOLUTION INTRAVENOUS; SUBCUTANEOUS at 13:11

## 2018-08-17 RX ADMIN — TAZOBACTAM SODIUM AND PIPERACILLIN SODIUM 3.38 G: 375; 3 INJECTION, SOLUTION INTRAVENOUS at 02:06

## 2018-08-17 RX ADMIN — LISINOPRIL 10 MG: 10 TABLET ORAL at 21:24

## 2018-08-17 RX ADMIN — OXYCODONE HYDROCHLORIDE AND ACETAMINOPHEN 1 TABLET: 5; 325 TABLET ORAL at 00:08

## 2018-08-17 RX ADMIN — TAZOBACTAM SODIUM AND PIPERACILLIN SODIUM 3.38 G: 375; 3 INJECTION, SOLUTION INTRAVENOUS at 13:11

## 2018-08-17 RX ADMIN — OXYCODONE HYDROCHLORIDE AND ACETAMINOPHEN 1 TABLET: 5; 325 TABLET ORAL at 18:25

## 2018-08-17 RX ADMIN — ACETAMINOPHEN 1000 MG: 500 TABLET, FILM COATED ORAL at 05:29

## 2018-08-17 RX ADMIN — ONDANSETRON 4 MG: 2 INJECTION INTRAMUSCULAR; INTRAVENOUS at 05:34

## 2018-08-17 RX ADMIN — ONDANSETRON 4 MG: 2 INJECTION INTRAMUSCULAR; INTRAVENOUS at 18:32

## 2018-08-17 RX ADMIN — DIAZEPAM 5 MG: 5 TABLET ORAL at 04:01

## 2018-08-17 RX ADMIN — OXYCODONE HYDROCHLORIDE AND ACETAMINOPHEN 1 TABLET: 5; 325 TABLET ORAL at 22:42

## 2018-08-17 RX ADMIN — OXYCODONE HYDROCHLORIDE AND ACETAMINOPHEN 1 TABLET: 5; 325 TABLET ORAL at 13:10

## 2018-08-17 NOTE — PROGRESS NOTES
" IM progress note      Tripp Lantigua  [id number]  1965     LOS: 2 days     Attending: Daniel Cline MD    Primary Care Provider: Trung Roca MD      Chief Complaint/Reason for visit:  Diverticulitis    Subjective   Doing well. Better pain control. Still with mild nausea. Tolerated soft diet. Ambulating frequently. Stoma with output. Denies f/c/sob/cp.  ( Good progress. Stoma bag emptied a few times in afternoon.)wy  Objective     Vital Signs  Blood pressure 127/89, pulse 79, temperature 97.4 °F (36.3 °C), temperature source Oral, resp. rate 18, height 182.9 cm (72\"), weight 116 kg (255 lb), SpO2 95 %.  Temp (24hrs), Av.1 °F (36.7 °C), Min:97.4 °F (36.3 °C), Max:98.8 °F (37.1 °C)      Nutrition: soft    Respiratory: RA    Physical Exam:     General Appearance:    Alert, cooperative, in no acute distress   Head:    Normocephalic, without obvious abnormality, atraumatic    Lungs:     Normal effort, symmetric chest rise, no crepitus, clear to      auscultation bilaterally             Heart:    Regular rhythm and normal rate, normal S1 and S2   Abdomen:     Soft, benign. PATRICIA present. Midline CDI. Stoma pink   Extremities:   No clubbing, cyanosis or edema.  No deformities.    Pulses:   Pulses palpable and equal bilaterally   Skin:   No bleeding, bruising or rash   Neurologic:   Moves all extremities with no obvious focal motor deficit.  Cranial nerves 2 - 12 grossly intact     Results Review:     I reviewed the patient's new clinical results.     Results from last 7 days  Lab Units 18  0508 18  0441 08/15/18  1747   WBC 10*3/mm3 10.52 14.41* 15.11*   HEMOGLOBIN g/dL 9.3* 10.7* 12.0*   HEMATOCRIT % 29.9* 34.5* 39.4   PLATELETS 10*3/mm3 167 214 212       Results from last 7 days  Lab Units 18  0508 18  0441   SODIUM mmol/L 137 135   POTASSIUM mmol/L 4.3 4.7   CHLORIDE mmol/L 108 105   CO2 mmol/L 27.0 26.0   BUN mg/dL 8* 10   CREATININE mg/dL 0.79 0.81   CALCIUM mg/dL 8.2* 8.0* "   GLUCOSE mg/dL 120* 165*     Results for IRMA WARREN (MRN [id number]) as of 8/17/2018 18:27   Ref. Range 8/17/2018 07:33 8/17/2018 11:30 8/17/2018 16:36   Glucose Latest Ref Range: 70 - 130 mg/dL 118 113 109     Results for IRMA WARREN (MRN [id number]) as of 8/16/2018 12:00   Ref. Range 8/16/2018 04:41   Magnesium Latest Ref Range: 1.3 - 2.7 mg/dL 2.1     I reviewed the patient's new imaging including images and reports.    All medications reviewed.     heparin (porcine) 5,000 Units Subcutaneous Q8H   insulin lispro 0-7 Units Subcutaneous 4x Daily With Meals & Nightly   lisinopril 10 mg Oral Nightly   piperacillin-tazobactam 3.375 g Intravenous Q6H       Assessment/Plan   Principal Problem:    S/P LAR with loop ileostomy  Active Problems:    Diverticulitis    HTN (hypertension)    Prediabetes vs DM - based on A1c    Acute blood loss anemia, mild    Leukocytosis, likely reactive      Plan  1. Ambulation  2. Pain control-prns   3. IS-encouraged  4. DVT proph- mechs/heparin  5. Bowel regimen- entereg  6. Diet, PO soft diet as tolerated. IVF per surgery  7. Monitor post-op labs  8. DC planning for home, likely tomorrow    WOCN- stoma teaching    HTN  - Continue lisinopril   - Monitor BP   - Holding parameters for BP meds  - Labetalol PRN for SBP>170     PreDM vs DM- based on A1C  - hgb A1c on 8/10/18  - Accuchecks ACHS with low dose SSI  - Seen by DM educator 8/16    Per Dr. Cline: Pt. Will need IV abx while in the hospital and plan to transition to oral upon discharge    I have personally performed the evaluation on this patient. My history is consistant  with HPI obtained. My exam finding are listed above. I have personally reviewed and discussed the above formulated treatment plan with pt, RN, family friend ( physician/ per pt request/ on the phone) and RAJAT JUAREZ.    LARRY Bronson  08/17/18  6:26 PM

## 2018-08-17 NOTE — PROGRESS NOTES
Continued Stay Note  [facility]     Patient Name: Tripp Lantigua  MRN: [id number]  Today's Date: 8/17/2018    Admit Date: 8/15/2018          Discharge Plan     Row Name 08/17/18 1505       Plan    Plan Home    Patient/Family in Agreement with Plan yes    Plan Comments No DC needs identified. Please send supplies home with the pt at DC.              Discharge Codes    No documentation.       Expected Discharge Date and Time     Expected Discharge Date Expected Discharge Time    Aug 18, 2018             Tayla Mendoza RN

## 2018-08-17 NOTE — PLAN OF CARE
Problem: Patient Care Overview  Goal: Plan of Care Review  Outcome: Ongoing (interventions implemented as appropriate)   08/17/18 5725   Coping/Psychosocial   Plan of Care Reviewed With patient;spouse;other (see comments)  (NICK Bedolla)   Plan of Care Review   Progress improving   OTHER   Outcome Summary WOC nurse for ostomy follow up education. Pt resting in bed, stated he has been up walking. Out put liquid brown. Wants to go home tomorrow morning. Will do appliance change tomorrow before discharge. Educated patient about diet, fluids, ostomy samples, and WOC contact information. Pt stated understanding. Wife engaged in learning. Pt has been emptying himself. Please call with questions or concerns.

## 2018-08-17 NOTE — PROGRESS NOTES
"[facility]    POD # 1   Length of stay: 2 days    Subjective:  Stable Post - Op course.    Vital Signs:  Blood pressure 127/89, pulse 79, temperature 97.4 °F (36.3 °C), temperature source Oral, resp. rate 18, height 182.9 cm (72\"), weight 116 kg (255 lb), SpO2 96 %.    Labs past 24 hours:  Have been reviewed by myself.    I/O last shift:  Have been reviewed by myself.    Exam:  Abdomen soft with mild distention.  Dressings Clean and Intact.  PATRICIA drain with serosang. Output. Ileostomy pink and functioning.        Assessment:  Overall looks well.  PATRICIA output still a little on the high side to remove. Labs look good.  WBC corrected. Alyce. PO's.  Has been up and walking.    Plan:    Keep PATRICIA drain today.  Continue with IV abx today.  Anticipate home tomorrow w/o drain.  I have left scripts for percocet and Augmentin on the chart.  Will follow up with patient in 1 week.    Daniel Cline MD  08/17/18  8:35 AM  "

## 2018-08-17 NOTE — PLAN OF CARE
Problem: Patient Care Overview  Goal: Plan of Care Review  Outcome: Ongoing (interventions implemented as appropriate)   08/17/18 1717   Coping/Psychosocial   Plan of Care Reviewed With patient   Plan of Care Review   Progress improving   OTHER   Outcome Summary Pt able to ambulate in dempsey this shift. Pt has c/o pain and some nausea at times, but controlled with PRNs. Pt emptying ostomy today after education completed this AM with shift assessment. Woc has completed some education as well with the patient and wife. VSS. No new conerns at this time.      Goal: Individualization and Mutuality  Outcome: Outcome(s) achieved Date Met: 08/17/18 08/17/18 1717   Individualization   Patient Specific Preferences door closed, family at bedside   Patient Specific Goals (Include Timeframe) emptying ostomy   Patient Specific Interventions enc woc education, enc emptying by patient- staff assisted this am and assisted with education, verb plan of care   Mutuality/Individual Preferences   What Anxieties, Fears, Concerns, or Questions Do You Have About Your Care? none   What Information Would Help Us Give You More Personalized Care? none   How Would You and/or Your Support Person Like to Participate in Your Care? none   Mutuality/Individual Preferences   How to Address Anxieties/Fears none       Problem: Pain, Acute (Adult)  Intervention: Monitor and Manage Analgesia   08/17/18 1717   Promote Effective Bowel Elimination   Bowel Intervention adequate fluid intake promoted;ambulation promoted;diet adjusted   Safety Management   Medication Review/Management medications reviewed     Intervention: Mutually Develop/Implement Acute Pain Management Plan   08/17/18 1310 08/17/18 1717   Cognitive Interventions   Sensory Stimulation Regulation --  quiet environment promoted   Promote Oxygenation/Perfusion   Pain Management Interventions see MAR;pillow support provided;position adjusted --      Intervention: Support/Optimize Psychosocial  Response to Acute Pain   08/17/18 1717   Coping/Psychosocial Interventions   Supportive Measures active listening utilized;positive reinforcement provided;problem solving facilitated   Psychosocial Support   Diversional Activities smartphone;television   Family/Support System Care presence promoted   Interventions   Trust Relationship/Rapport care explained;choices provided;questions answered       Goal: Identify Related Risk Factors and Signs and Symptoms  Outcome: Ongoing (interventions implemented as appropriate)   08/17/18 1717   Pain, Acute (Adult)   Related Risk Factors (Acute Pain) patient perception;surgery   Signs and Symptoms (Acute Pain) verbalization of pain descriptors     Goal: Acceptable Pain Control/Comfort Level  Outcome: Ongoing (interventions implemented as appropriate)   08/17/18 1717   Pain, Acute (Adult)   Acceptable Pain Control/Comfort Level making progress toward outcome

## 2018-08-17 NOTE — PLAN OF CARE
Problem: Patient Care Overview  Goal: Plan of Care Review  Outcome: Ongoing (interventions implemented as appropriate)   08/17/18 0200 08/17/18 0323   Coping/Psychosocial   Plan of Care Reviewed With patient --    Plan of Care Review   Progress --  improving   OTHER   Outcome Summary --  Stool/Flatus output from iliostomy. No leakage. Stoma red and moist; Pain well controlled with PRN medications available.      Goal: Discharge Needs Assessment  Outcome: Ongoing (interventions implemented as appropriate)    Goal: Interprofessional Rounds/Family Conf  Outcome: Ongoing (interventions implemented as appropriate)   08/17/18 0323   Interdisciplinary Rounds/Family Conf   Participants ;dietitian/nutrition services;ostomy/continence specialist;physician;nursing       Problem: Pain, Acute (Adult)  Goal: Identify Related Risk Factors and Signs and Symptoms  Outcome: Ongoing (interventions implemented as appropriate)   08/16/18 0435 08/17/18 0323   Pain, Acute (Adult)   Related Risk Factors (Acute Pain) surgery;patient perception --    Signs and Symptoms (Acute Pain) --  verbalization of pain descriptors     Goal: Acceptable Pain Control/Comfort Level  Outcome: Ongoing (interventions implemented as appropriate)   08/16/18 0435   Pain, Acute (Adult)   Acceptable Pain Control/Comfort Level making progress toward outcome

## 2018-08-18 VITALS
BODY MASS INDEX: 34.54 KG/M2 | HEIGHT: 72 IN | WEIGHT: 255 LBS | OXYGEN SATURATION: 95 % | RESPIRATION RATE: 18 BRPM | DIASTOLIC BLOOD PRESSURE: 81 MMHG | HEART RATE: 84 BPM | TEMPERATURE: 98.2 F | SYSTOLIC BLOOD PRESSURE: 131 MMHG

## 2018-08-18 LAB
GLUCOSE BLDC GLUCOMTR-MCNC: 115 MG/DL (ref 70–130)
GLUCOSE BLDC GLUCOMTR-MCNC: 117 MG/DL (ref 70–130)

## 2018-08-18 PROCEDURE — 25010000002 HEPARIN (PORCINE) PER 1000 UNITS: Performed by: COLON & RECTAL SURGERY

## 2018-08-18 PROCEDURE — 25010000002 PIPERACILLIN SOD-TAZOBACTAM PER 1 G: Performed by: COLON & RECTAL SURGERY

## 2018-08-18 PROCEDURE — 82962 GLUCOSE BLOOD TEST: CPT

## 2018-08-18 RX ORDER — OXYCODONE HYDROCHLORIDE AND ACETAMINOPHEN 5; 325 MG/1; MG/1
1 TABLET ORAL EVERY 4 HOURS PRN
Start: 2018-08-18 | End: 2018-08-27

## 2018-08-18 RX ORDER — AMOXICILLIN AND CLAVULANATE POTASSIUM 500; 125 MG/1; MG/1
1 TABLET, FILM COATED ORAL 3 TIMES DAILY
Start: 2018-08-18 | End: 2019-06-13

## 2018-08-18 RX ADMIN — DIAZEPAM 5 MG: 5 TABLET ORAL at 01:33

## 2018-08-18 RX ADMIN — TAZOBACTAM SODIUM AND PIPERACILLIN SODIUM 3.38 G: 375; 3 INJECTION, SOLUTION INTRAVENOUS at 01:33

## 2018-08-18 RX ADMIN — HEPARIN SODIUM 5000 UNITS: 5000 INJECTION, SOLUTION INTRAVENOUS; SUBCUTANEOUS at 05:22

## 2018-08-18 RX ADMIN — OXYCODONE HYDROCHLORIDE AND ACETAMINOPHEN 1 TABLET: 5; 325 TABLET ORAL at 10:42

## 2018-08-18 RX ADMIN — TAZOBACTAM SODIUM AND PIPERACILLIN SODIUM 3.38 G: 375; 3 INJECTION, SOLUTION INTRAVENOUS at 08:26

## 2018-08-18 RX ADMIN — OXYCODONE HYDROCHLORIDE AND ACETAMINOPHEN 1 TABLET: 5; 325 TABLET ORAL at 04:32

## 2018-08-18 NOTE — PLAN OF CARE
Problem: Patient Care Overview  Goal: Plan of Care Review  Outcome: Ongoing (interventions implemented as appropriate)   08/17/18 1717 08/17/18 2000   Coping/Psychosocial   Plan of Care Reviewed With --  patient   Plan of Care Review   Progress improving --    OTHER   Outcome Summary Pt able to ambulate in dempsey this shift. Pt has c/o pain and some nausea at times, but controlled with PRNs. Pt emptying ostomy today after education completed this AM with shift assessment. [facility] has completed some education as well with the patient and wife. VSS. No new conerns at this time.  --      Goal: Discharge Needs Assessment  Outcome: Ongoing (interventions implemented as appropriate)   08/15/18 1400 08/15/18 1905 08/16/18 1016   Discharge Needs Assessment   Readmission Within the Last 30 Days --  no previous admission in last 30 days --    Concerns to be Addressed --  denies needs/concerns at this time --    Patient/Family Anticipates Transition to home with family --  --    Patient/Family Anticipated Services at Transition --  none --    Transportation Concerns --  car, none --    Transportation Anticipated car, drives self --  --    Anticipated Changes Related to Illness --  none --    Equipment Needed After Discharge --  --  none   Discharge Coordination/Progress --  --  No HH involved   Disability   Equipment Currently Used at Home --  --  none     Goal: Interprofessional Rounds/Family Conf  Outcome: Ongoing (interventions implemented as appropriate)   08/17/18 0323   Interdisciplinary Rounds/Family Conf   Participants ;dietitian/nutrition services;ostomy/continence specialist;physician;nursing       Problem: Pain, Acute (Adult)  Goal: Identify Related Risk Factors and Signs and Symptoms  Outcome: Ongoing (interventions implemented as appropriate)   08/17/18 1717   Pain, Acute (Adult)   Related Risk Factors (Acute Pain) patient perception;surgery   Signs and Symptoms (Acute Pain) verbalization of pain descriptors      Goal: Acceptable Pain Control/Comfort Level  Outcome: Ongoing (interventions implemented as appropriate)   08/18/18 0418   Pain, Acute (Adult)   Acceptable Pain Control/Comfort Level making progress toward outcome

## 2018-08-18 NOTE — DISCHARGE SUMMARY
Patient Name: Tripp Lantigua  MRN: [id number]  : 1965  DOS: 2018    Attending: Daniel Cline MD    Primary Care Provider: Trung Roca MD    Date of Admission:.8/15/2018  5:48 AM    Date of Discharge:  2018    Discharge Diagnosis: Principal Problem:    S/P LAR with loop ileostomy  Active Problems:    Diverticulitis    HTN (hypertension)    Prediabetes vs DM - based on A1c    Acute blood loss anemia, mild    Leukocytosis, likely reactive      Hospital Course  Patient is a 53 y.o. male presented for low anterior colon resection by Dr. Cline under GA. He tolerated surgery well and was admitted for further medical management. He had been taking antibiotics for weeks prior to surgery for diverticulitis.     Per Dr. Cline's note: (((53-year-old gentleman who has been dealing with recurrent bouts of diverticulitis for years.  However, since April of this year he's had worsening symptoms with ultimate urinary tract infection.  Subsequent colonoscopy CT scan shows a stricture in his colon and a colovesical fistula.  He was initially seen at [facility] and it was recommended he undergo low anterior resection to manage this.  He came to me for a second opinion and I have concurred.  I did talk with the patient about the possibility that he would need to have a diverting loop ileostomy given the degree of inflammation that may be found at the time of surgery.  He agreed to proceed.  Both he and his wife understood the risks, benefits, and alternatives.)))     He was provided pain medication as needed for pain control.  Adjustments were made to pain medications to optimize postop pain management. Risks and benefits of opiate medications discussed with patient.    He received DVT prophylaxis with subcutaneous Heparin as well as mechanicals    He used an IS for atelectasis prophylaxis.    During his stay, he had output from his stoma and was seen by the WOCN for instruction on care of  his new appliance.   He tolerated his by mouth diet well.  He was provided a bowel regimen and was encouraged to ambulate frequently which he did.    Home medications were resumed as appropriate, and labs were monitored and remained fairly stable.   His Hgb A1C was elevated on his pre-op labs. A diabetes educator provided he with diabetic education and a glucometer.    With the progress he has made, he is ready for DC home today.    Discussed with patient regarding plan and he shows understanding and agreement.       Procedures Performed  PRE-OPERATIVE DIAGNOSIS: Diverticulitis with colovesical fistula  POST-OPERATIVE DIAGNOSIS: Same  PROCEEDURE: Open low anterior resection with mobilization of splenic flexure and low colorectal anastomosis.  Placement of a Daniel-Carvajal drain.  Non-incidental appendectomy.  Placement of a loop ileostomy.  Placement of Seprafilm at the ileostomy.  Placement of Tisseel in the left upper quadrant and colorectal anastomosis.  Placement of bilateral ureteral catheters.  Placement of an omental flap.     SURGEON: Dr. Daniel Cline M.D.       Pertinent Test Results:    I reviewed the patient's new clinical results.     Results from last 7 days  Lab Units 08/17/18  0508 08/16/18  0441 08/15/18  1747   WBC 10*3/mm3 10.52 14.41* 15.11*   HEMOGLOBIN g/dL 9.3* 10.7* 12.0*   HEMATOCRIT % 29.9* 34.5* 39.4   PLATELETS 10*3/mm3 167 214 212       Results from last 7 days  Lab Units 08/17/18  0508 08/16/18  0441   SODIUM mmol/L 137 135   POTASSIUM mmol/L 4.3 4.7   CHLORIDE mmol/L 108 105   CO2 mmol/L 27.0 26.0   BUN mg/dL 8* 10   CREATININE mg/dL 0.79 0.81   CALCIUM mg/dL 8.2* 8.0*   GLUCOSE mg/dL 120* 165*     Results for KELVIN IRMA R (MRN [id number]) as of 8/20/2018 09:20   Ref. Range 8/17/2018 16:36 8/17/2018 20:55 8/18/2018 07:37 8/18/2018 12:03   Glucose Latest Ref Range: 70 - 130 mg/dL 109 104 117 115     I reviewed the patient's new imaging including images and reports.      Discharge  "Assessment:    Vital Signs  /81 (BP Location: Right arm, Patient Position: Lying)   Pulse 84   Temp 98.2 °F (36.8 °C) (Oral)   Resp 18   Ht 182.9 cm (72\")   Wt 116 kg (255 lb)   SpO2 95%   BMI 34.58 kg/m²   Temp (24hrs), Av.2 °F (36.8 °C), Min:98.1 °F (36.7 °C), Max:98.2 °F (36.8 °C)      General Appearance:    Alert, cooperative, in no acute distress   Lungs:     Clear to auscultation,respirations regular, even and                   unlabored    Heart:    Regular rhythm and normal rate, normal S1 and S2, no            murmur, no gallop, no rub, no click   Abdomen:     Soft, benign.   Midline CDI. Stoma pink. PATRICIA out prior to dc.   Extremities:   Moves all extremities well, no edema, no cyanosis, no              redness   Pulses:   Pulses palpable and equal bilaterally   Skin:   No bleeding, bruising or rash   Neurologic:   Cranial nerves 2 - 12 grossly intact, sensation intact, DTR        present and equal bilaterally       Discharge Disposition: Home    Discharge Medications     Discharge Medications      New Medications      Instructions Start Date   amoxicillin-clavulanate 500-125 MG per tablet  Commonly known as:  AUGMENTIN   1 tablet, Oral, 3 Times Daily      oxyCODONE-acetaminophen 5-325 MG per tablet  Commonly known as:  PERCOCET   1 tablet, Oral, Every 4 Hours PRN         Continue These Medications      Instructions Start Date   aspirin 81 MG chewable tablet   81 mg, Oral, Daily      lisinopril 10 MG tablet  Commonly known as:  PRINIVIL,ZESTRIL   10 mg, Oral, Daily      Melatonin 10 MG tablet   Oral, As Needed         Stop These Medications    amoxicillin 500 MG capsule  Commonly known as:  AMOXIL     HYDROCODONE-IBUPROFEN PO            Discharge Diet: Soft, bland diet    Activity at Discharge: ambulate    Follow-up Appointments  Dr. Cline per his orders  PCP for DM management    Discussed with pt and wife. Discharge took over 30 minutes.     Kimmy Vickers MD  18  9:23 " AM

## 2018-08-18 NOTE — PLAN OF CARE
Problem: Patient Care Overview  Goal: Plan of Care Review  Outcome: Ongoing (interventions implemented as appropriate)   08/18/18 0830   Coping/Psychosocial   Plan of Care Reviewed With patient;spouse   Plan of Care Review   Progress improving   OTHER   Outcome Summary WOC nurse f/u to assess loop ileostomy. Pt plans to be discharged today. Stoma red, moist and protruding above skin level; alecia-stomal skin pink and dry. Sutures and bridge removed. Appliance changed using Maurice #[id number] flat one-piece. Pt and wife performed appliance change with minimal assistance. Extensive education performed, including diet, fluids, activity, lifting restrictions, travel, work, clothing, bathing, swimming, stomal care, appliance options, ordering of supplies. Discharge supplies given to pt. Please contact WOC nurse as needed for concerns.

## 2018-08-20 NOTE — PAYOR COMM NOTE
"Tripp Lantigua (53 y.o. Male)     Date of Birth Social Security Number Address Home Phone MRN    1965  [address] [place] [id number] [phone] [id number]    Jew Marital Status          Latter-day        Admission Date Admission Type Admitting Provider Attending Provider Department, Room/Bed    8/15/18 Elective Daniel Cline MD  [facility] 5G, S552/1    Discharge Date Discharge Disposition Discharge Destination        2018 Home or Self Care              Attending Provider:  (none)   Allergies:  No Known Allergies    Isolation:  None   Infection:  None   Code Status:  Prior    Ht:  182.9 cm (72\")   Wt:  116 kg (255 lb)    Admission Cmt:  None   Principal Problem:  S/P LAR with loop ileostomy [Z90.49]                 Active Insurance as of 8/15/2018     Primary Coverage     Payor Plan Insurance Group Employer/Plan Group    [employer]HEM BLUE CROSS ANTHEM BLUE CROSS BLUE SHIELD PPO [id number]     Payor Plan Address Payor Plan Phone Number Effective From Effective To    [address] [phone] 2017     [facility] [id number]       Subscriber Name Subscriber Birth Date Member ID       TRIPP LANTIGUA 1965 [id number]                 Emergency Contacts      (Rel.) Home Phone Work Phone Mobile Phone    Natalya Lantigua (Spouse) -- -- [phone]               Discharge Summary      Brandy Ritchie APRN at 2018  9:22 AM          Patient Name: Tripp Lantigua  MRN: [id number]  : 1965  DOS: 2018    Attending: Daniel Cline MD    Primary Care Provider: Trung Roca MD    Date of Admission:.8/15/2018  5:48 AM    Date of Discharge:  2018    Discharge Diagnosis: Principal Problem:    S/P LAR with loop ileostomy  Active Problems:    Diverticulitis    HTN (hypertension)    Prediabetes vs DM - based on A1c    Acute blood loss anemia, mild    Leukocytosis, likely reactive      Hospital Course  Patient is a 53 y.o. male " presented for low anterior colon resection by Dr. Cline under GA. He tolerated surgery well and was admitted for further medical management. He had been taking antibiotics for weeks prior to surgery for diverticulitis.     Per Dr. Cline's note: (((53-year-old gentleman who has been dealing with recurrent bouts of diverticulitis for years.  However, since April of this year he's had worsening symptoms with ultimate urinary tract infection.  Subsequent colonoscopy CT scan shows a stricture in his colon and a colovesical fistula.  He was initially seen at [facility] and it was recommended he undergo low anterior resection to manage this.  He came to me for a second opinion and I have concurred.  I did talk with the patient about the possibility that he would need to have a diverting loop ileostomy given the degree of inflammation that may be found at the time of surgery.  He agreed to proceed.  Both he and his wife understood the risks, benefits, and alternatives.)))     He was provided pain medication as needed for pain control.  Adjustments were made to pain medications to optimize postop pain management. Risks and benefits of opiate medications discussed with patient.    He received DVT prophylaxis with subcutaneous Heparin as well as mechanicals    He used an IS for atelectasis prophylaxis.  During his stay, he had output from his stoma and was seen by the WOCN for instruction on care of his new appliance.   He tolerated his by mouth diet well.  He was provided a bowel regimen and was encouraged to ambulate frequently which he did.  Home medications were resumed as appropriate, and labs were monitored and remained fairly stable.   His Hgb A1C was elevated on his pre-op labs. A diabetes educator provided he with diabetic education and a glucometer.    With the progress he has made, he is ready for [place] home today.    Discussed with patient regarding plan and he shows understanding and agreement.  "      Procedures Performed  PRE-OPERATIVE DIAGNOSIS: Diverticulitis with colovesical fistula  POST-OPERATIVE DIAGNOSIS: Same  PROCEEDURE: Open low anterior resection with mobilization of splenic flexure and low colorectal anastomosis.  Placement of a Daniel-Carvajal drain.  Non-incidental appendectomy.  Placement of a loop ileostomy.  Placement of Seprafilm at the ileostomy.  Placement of Tisseel in the left upper quadrant and colorectal anastomosis.  Placement of bilateral ureteral catheters.  Placement of an omental flap.     SURGEON: Dr. Daniel Cline M.D.       Pertinent Test Results:    I reviewed the patient's new clinical results.     Results from last 7 days  Lab Units 18  0508 18  0441 08/15/18  1747   WBC 10*3/mm3 10.52 14.41* 15.11*   HEMOGLOBIN g/dL 9.3* 10.7* 12.0*   HEMATOCRIT % 29.9* 34.5* 39.4   PLATELETS 10*3/mm3 167 214 212       Results from last 7 days  Lab Units 18  0508 18  0441   SODIUM mmol/L 137 135   POTASSIUM mmol/L 4.3 4.7   CHLORIDE mmol/L 108 105   CO2 mmol/L 27.0 26.0   BUN mg/dL 8* 10   CREATININE mg/dL 0.79 0.81   CALCIUM mg/dL 8.2* 8.0*   GLUCOSE mg/dL 120* 165*     Results for IRMA WARREN (MRN [id number]) as of 2018 09:20   Ref. Range 2018 16:36 2018 20:55 2018 07:37 2018 12:03   Glucose Latest Ref Range: 70 - 130 mg/dL 109 104 117 115     I reviewed the patient's new imaging including images and reports.      Discharge Assessment:    Vital Signs  /81 (BP Location: Right arm, Patient Position: Lying)   Pulse 84   Temp 98.2 °F (36.8 °C) (Oral)   Resp 18   Ht 182.9 cm (72\")   Wt 116 kg (255 lb)   SpO2 95%   BMI 34.58 kg/m²    Temp (24hrs), Av.2 °F (36.8 °C), Min:98.1 °F (36.7 °C), Max:98.2 °F (36.8 °C)      General Appearance:    Alert, cooperative, in no acute distress   Lungs:     Clear to auscultation,respirations regular, even and                   unlabored    Heart:    Regular rhythm and normal rate, normal S1 " and S2, no            murmur, no gallop, no rub, no click   Abdomen:     Soft, benign. PATRICIA present. Midline CDI. Stoma pink   Extremities:   Moves all extremities well, no edema, no cyanosis, no              redness   Pulses:   Pulses palpable and equal bilaterally   Skin:   No bleeding, bruising or rash   Neurologic:   Cranial nerves 2 - 12 grossly intact, sensation intact, DTR        present and equal bilaterally       Discharge Disposition: Home    Discharge Medications     Discharge Medications      New Medications      Instructions Start Date   amoxicillin-clavulanate 500-125 MG per tablet  Commonly known as:  AUGMENTIN   1 tablet, Oral, 3 Times Daily      oxyCODONE-acetaminophen 5-325 MG per tablet  Commonly known as:  PERCOCET   1 tablet, Oral, Every 4 Hours PRN         Continue These Medications      Instructions Start Date   aspirin 81 MG chewable tablet   81 mg, Oral, Daily      lisinopril 10 MG tablet  Commonly known as:  PRINIVIL,ZESTRIL   10 mg, Oral, Daily      Melatonin 10 MG tablet   Oral, As Needed         Stop These Medications    amoxicillin 500 MG capsule  Commonly known as:  AMOXIL     HYDROCODONE-IBUPROFEN PO            Discharge Diet: Soft, bland diet    Activity at Discharge: ambulate    Follow-up Appointments  Dr. Cline per his orders  PCP for DM management       Kimmy Vickers MD  08/18/18  9:23 AM            Electronically signed by Brandy Ritchie APRN at 8/20/2018  9:21 AM

## 2018-08-22 LAB
CYTO UR: NORMAL
LAB AP CASE REPORT: NORMAL
LAB AP CLINICAL INFORMATION: NORMAL
PATH REPORT.FINAL DX SPEC: NORMAL
PATH REPORT.GROSS SPEC: NORMAL

## 2018-09-26 ENCOUNTER — HOSPITAL ENCOUNTER (OUTPATIENT)
Dept: WOUND CARE | Facility: HOSPITAL | Age: 53
Setting detail: RECURRING SERIES
Discharge: HOME | End: 2018-09-26

## 2018-09-26 PROCEDURE — G0463 HOSPITAL OUTPT CLINIC VISIT: HCPCS

## 2018-09-26 NOTE — NURSING NOTE
"Patient seen today r/t appliance leakage and frequent appliance changes. Patient has loop ileostomy. Stoma is nice and budding. Peristomal skin slightly reddened but intact at this time. Patient has been wearing Maurice New Image 2 3/4\" flat appliance. However, when patient is in sitting position bilateral peristomal creasing can be seen. Patient needs convexity. This will allow for longer wear times and should eliminate the leaking issues. Placed patient in Coloplast Horatio Flex shallow convexity and provided belt. Extra appliance provided and will send convexity samples to patient. Patient will follow-up with [facility] nurse if needs arise.   "

## 2018-09-28 ENCOUNTER — TRANSCRIBE ORDERS (OUTPATIENT)
Dept: ADMINISTRATIVE | Facility: HOSPITAL | Age: 53
End: 2018-09-28

## 2018-09-28 DIAGNOSIS — K57.92 ACUTE DIVERTICULITIS: Primary | ICD-10-CM

## 2018-10-12 ENCOUNTER — HOSPITAL ENCOUNTER (OUTPATIENT)
Dept: GENERAL RADIOLOGY | Facility: HOSPITAL | Age: 53
Discharge: HOME | End: 2018-10-12
Attending: COLON & RECTAL SURGERY | Admitting: COLON & RECTAL SURGERY

## 2018-10-12 DIAGNOSIS — K57.92 ACUTE DIVERTICULITIS: ICD-10-CM

## 2018-10-12 PROCEDURE — 74270 X-RAY XM COLON 1CNTRST STD: CPT

## 2018-10-12 PROCEDURE — 0 DIATRIZOATE MEGLUMINE & SODIUM PER 1 ML: Performed by: COLON & RECTAL SURGERY

## 2018-10-12 RX ADMIN — DIATRIZOATE MEGLUMINE AND DIATRIZOATE SODIUM 480 ML: 660; 100 LIQUID ORAL; RECTAL at 10:24

## 2019-06-13 ENCOUNTER — OFFICE VISIT (OUTPATIENT)
Dept: FAMILY MEDICINE CLINIC | Facility: CLINIC | Age: 54
End: 2019-06-13

## 2019-06-13 ENCOUNTER — APPOINTMENT (OUTPATIENT)
Dept: LAB | Facility: HOSPITAL | Age: 54
End: 2019-06-13

## 2019-06-13 VITALS
HEIGHT: 72 IN | HEART RATE: 113 BPM | DIASTOLIC BLOOD PRESSURE: 78 MMHG | WEIGHT: 266.6 LBS | OXYGEN SATURATION: 98 % | BODY MASS INDEX: 36.11 KG/M2 | SYSTOLIC BLOOD PRESSURE: 138 MMHG

## 2019-06-13 DIAGNOSIS — E29.1 TESTOSTERONE DEFICIENCY IN MALE: ICD-10-CM

## 2019-06-13 DIAGNOSIS — Z00.00 PREVENTATIVE HEALTH CARE: ICD-10-CM

## 2019-06-13 DIAGNOSIS — I10 ESSENTIAL HYPERTENSION: Primary | ICD-10-CM

## 2019-06-13 DIAGNOSIS — E66.01 CLASS 2 SEVERE OBESITY DUE TO EXCESS CALORIES WITH SERIOUS COMORBIDITY AND BODY MASS INDEX (BMI) OF 36.0 TO 36.9 IN ADULT (HCC): ICD-10-CM

## 2019-06-13 DIAGNOSIS — R73.03 PREDIABETES: ICD-10-CM

## 2019-06-13 LAB
ALBUMIN SERPL-MCNC: 4.3 G/DL (ref 3.5–5.2)
ALBUMIN/GLOB SERPL: 1.8 G/DL
ALP SERPL-CCNC: 45 U/L (ref 39–117)
ALT SERPL W P-5'-P-CCNC: 24 U/L (ref 1–41)
ANION GAP SERPL CALCULATED.3IONS-SCNC: 13.4 MMOL/L
AST SERPL-CCNC: 24 U/L (ref 1–40)
BILIRUB SERPL-MCNC: 0.7 MG/DL (ref 0.2–1.2)
BILIRUB UR QL STRIP: NEGATIVE
BUN BLD-MCNC: 14 MG/DL (ref 6–20)
BUN/CREAT SERPL: 15.6 (ref 7–25)
CALCIUM SPEC-SCNC: 9.1 MG/DL (ref 8.6–10.5)
CHLORIDE SERPL-SCNC: 106 MMOL/L (ref 98–107)
CHOLEST SERPL-MCNC: 185 MG/DL (ref 0–200)
CLARITY UR: CLEAR
CO2 SERPL-SCNC: 24.6 MMOL/L (ref 22–29)
COLOR UR: ABNORMAL
CREAT BLD-MCNC: 0.9 MG/DL (ref 0.76–1.27)
DEPRECATED RDW RBC AUTO: 44.3 FL (ref 37–54)
EOSINOPHIL # BLD MANUAL: 0.05 10*3/MM3 (ref 0–0.4)
EOSINOPHIL NFR BLD MANUAL: 1 % (ref 0.3–6.2)
ERYTHROCYTE [DISTWIDTH] IN BLOOD BY AUTOMATED COUNT: 13.2 % (ref 12.3–15.4)
GFR SERPL CREATININE-BSD FRML MDRD: 87 ML/MIN/1.73
GLOBULIN UR ELPH-MCNC: 2.4 GM/DL
GLUCOSE BLD-MCNC: 116 MG/DL (ref 65–99)
GLUCOSE UR STRIP-MCNC: NEGATIVE MG/DL
HBA1C MFR BLD: 6.1 %
HCT VFR BLD AUTO: 47.3 % (ref 37.5–51)
HDLC SERPL-MCNC: 51 MG/DL (ref 40–60)
HGB BLD-MCNC: 14.9 G/DL (ref 13–17.7)
HGB UR QL STRIP.AUTO: NEGATIVE
KETONES UR QL STRIP: ABNORMAL
LDLC SERPL CALC-MCNC: 107 MG/DL (ref 0–100)
LDLC/HDLC SERPL: 2.09 {RATIO}
LEUKOCYTE ESTERASE UR QL STRIP.AUTO: NEGATIVE
LYMPHOCYTES # BLD MANUAL: 1.36 10*3/MM3 (ref 0.7–3.1)
LYMPHOCYTES NFR BLD MANUAL: 26 % (ref 19.6–45.3)
LYMPHOCYTES NFR BLD MANUAL: 7 % (ref 5–12)
MCH RBC QN AUTO: 28.9 PG (ref 26.6–33)
MCHC RBC AUTO-ENTMCNC: 31.5 G/DL (ref 31.5–35.7)
MCV RBC AUTO: 91.7 FL (ref 79–97)
MONOCYTES # BLD AUTO: 0.37 10*3/MM3 (ref 0.1–0.9)
NEUTROPHILS # BLD AUTO: 3.45 10*3/MM3 (ref 1.7–7)
NEUTROPHILS NFR BLD MANUAL: 66 % (ref 42.7–76)
NITRITE UR QL STRIP: NEGATIVE
PH UR STRIP.AUTO: 6.5 [PH] (ref 5–8)
PLAT MORPH BLD: NORMAL
PLATELET # BLD AUTO: 135 10*3/MM3 (ref 140–450)
PMV BLD AUTO: 13.6 FL (ref 6–12)
POTASSIUM BLD-SCNC: 4.4 MMOL/L (ref 3.5–5.2)
PROT SERPL-MCNC: 6.7 G/DL (ref 6–8.5)
PROT UR QL STRIP: NEGATIVE
RBC # BLD AUTO: 5.16 10*6/MM3 (ref 4.14–5.8)
RBC MORPH BLD: NORMAL
SODIUM BLD-SCNC: 144 MMOL/L (ref 136–145)
SP GR UR STRIP: 1.03 (ref 1–1.03)
TESTOST SERPL-MCNC: 194 NG/DL (ref 193–740)
TRIGL SERPL-MCNC: 136 MG/DL (ref 0–150)
TSH SERPL DL<=0.05 MIU/L-ACNC: 0.8 MIU/ML (ref 0.27–4.2)
UROBILINOGEN UR QL STRIP: ABNORMAL
VLDLC SERPL-MCNC: 27.2 MG/DL (ref 5–40)
WBC MORPH BLD: NORMAL
WBC NRBC COR # BLD: 5.23 10*3/MM3 (ref 3.4–10.8)

## 2019-06-13 PROCEDURE — 80061 LIPID PANEL: CPT | Performed by: FAMILY MEDICINE

## 2019-06-13 PROCEDURE — 84403 ASSAY OF TOTAL TESTOSTERONE: CPT | Performed by: FAMILY MEDICINE

## 2019-06-13 PROCEDURE — 83036 HEMOGLOBIN GLYCOSYLATED A1C: CPT | Performed by: FAMILY MEDICINE

## 2019-06-13 PROCEDURE — 80053 COMPREHEN METABOLIC PANEL: CPT | Performed by: FAMILY MEDICINE

## 2019-06-13 PROCEDURE — 85025 COMPLETE CBC W/AUTO DIFF WBC: CPT | Performed by: FAMILY MEDICINE

## 2019-06-13 PROCEDURE — 81003 URINALYSIS AUTO W/O SCOPE: CPT | Performed by: FAMILY MEDICINE

## 2019-06-13 PROCEDURE — 99203 OFFICE O/P NEW LOW 30 MIN: CPT | Performed by: FAMILY MEDICINE

## 2019-06-13 PROCEDURE — 85007 BL SMEAR W/DIFF WBC COUNT: CPT | Performed by: FAMILY MEDICINE

## 2019-06-13 PROCEDURE — 84443 ASSAY THYROID STIM HORMONE: CPT | Performed by: FAMILY MEDICINE

## 2019-06-13 RX ORDER — BIOTIN 5 MG
TABLET ORAL
COMMUNITY

## 2019-06-13 RX ORDER — MULTIPLE VITAMINS W/ MINERALS TAB 9MG-400MCG
1 TAB ORAL DAILY
COMMUNITY

## 2019-06-13 RX ORDER — DIPHENHYDRAMINE HCL 25 MG
25 TABLET ORAL EVERY 6 HOURS PRN
COMMUNITY
End: 2023-01-06

## 2019-06-13 RX ORDER — FLUTICASONE PROPIONATE 50 MCG
SPRAY, SUSPENSION (ML) NASAL DAILY
COMMUNITY
Start: 2019-04-25 | End: 2022-10-21 | Stop reason: SDUPTHER

## 2019-06-13 RX ORDER — HYDROCORTISONE ACETATE 0.5 %
1 CREAM (GRAM) TOPICAL DAILY
COMMUNITY

## 2019-06-13 RX ORDER — TESTOSTERONE 16.2 MG/G
GEL TRANSDERMAL
COMMUNITY
Start: 2019-04-08 | End: 2022-11-22 | Stop reason: SDUPTHER

## 2019-06-13 RX ORDER — TADALAFIL 5 MG/1
5 TABLET ORAL DAILY PRN
COMMUNITY

## 2019-06-13 RX ORDER — SILDENAFIL 100 MG/1
100 TABLET, FILM COATED ORAL DAILY PRN
COMMUNITY
End: 2022-05-13

## 2019-06-13 RX ORDER — ASCORBIC ACID 500 MG
500 TABLET ORAL DAILY
COMMUNITY

## 2019-06-13 NOTE — PROGRESS NOTES
Subjective   Tripp Lantigua is a 54 y.o. male.     Chief Complaint   Patient presents with   • Establish Care       History of Present Illness     Chronic health conditions:  Testosterone deficiency: Stable  Hypertension: Stable, impacted by weight  Diabetes mellitus: Previously diagnosed, A1c today 6.1%    Other physicians currently involved in patient's care:  Dr. Son Mathew immunologist- chronic leukocytosis    Acute complaints:  Tripp Lantigua is a 54 y.o. male who presents today to establish care.     The following portions of the patient's history were reviewed and updated as appropriate: allergies, current medications, past family history, past medical history, past social history, past surgical history and problem list.    Active Ambulatory Problems     Diagnosis Date Noted   • Diverticulitis 08/15/2018   • S/P LAR with loop ileostomy 08/15/2018   • HTN (hypertension) 08/15/2018   • Prediabetes vs DM - based on A1c 08/15/2018   • Acute blood loss anemia, mild 08/15/2018   • Leukocytosis, likely reactive 08/15/2018     Resolved Ambulatory Problems     Diagnosis Date Noted   • No Resolved Ambulatory Problems     Past Medical History:   Diagnosis Date   • Arthritis    • Chronic bronchitis (CMS/HCC)    • Colitis    • Colon polyp    • Diverticulitis    • Diverticulosis    • GERD (gastroesophageal reflux disease)    • Hypertension    • IBS (irritable bowel syndrome)    • Kidney stone      Past Surgical History:   Procedure Laterality Date   • COLON RESECTION N/A 8/15/2018    Procedure: OPEN LOW ANTERIOR RESECTION WITH TAKEDOWN OF COLOVESICAL FISTULA, MOBILIZATION OF SPLENIC FLEXIURE, APPENDECTOMY, AND ILEOSTOMY;  Surgeon: Daniel Cline MD;  Location: [facility] OR;  Service: General   • COLONOSCOPY     • CYSTOSCOPY Bilateral 8/15/2018    Procedure: CYSTOSCOPY WITH BILATERAL URETERAL STENT PLACEMENT;  Surgeon: Daniel Cline MD;  Location:  [facility] OR;  Service: General   • JOINT REPLACEMENT Bilateral      "hip     Family History   Problem Relation Age of Onset   • Diverticulosis Mother    • Cancer Father    • Diverticulosis Father    • Cancer Maternal Grandfather      Social History     Socioeconomic History   • Marital status:      Spouse name: Not on file   • Number of children: Not on file   • Years of education: Not on file   • Highest education level: Not on file   Tobacco Use   • Smoking status: Never Smoker   • Smokeless tobacco: Never Used   Substance and Sexual Activity   • Alcohol use: Yes     Comment: 4x a week    • Drug use: No   • Sexual activity: Defer         Review of Systems   Constitutional: Negative.    Respiratory: Negative.    Cardiovascular: Negative.        Objective   Blood pressure 138/78, pulse 113, height 182.9 cm (72.01\"), weight 121 kg (266 lb 9.6 oz), SpO2 98 %.  Nursing note reviewed  Physical Exam  Const: NAD, A&Ox4, Pleasant, Cooperative  Eyes: EOMI, no conjunctivitis  ENT: No nasal discharge present, neck supple  Cardiac: Regular rate and rhythm, no cyanosis  Resp: Respiratory rate within normal limits, no increased work of breathing, no audible wheezing or retractions noted  GI: No distention or ascites  MSK: Motor and sensation grossly intact in bilateral upper extremities  Neurologic: CN II-XII grossly intact  Psych: Appropriate mood and behavior.  Skin: Pink, warm, dry  Procedures  Assessment/Plan   Tripp was seen today for establish care.    Diagnoses and all orders for this visit:    Essential hypertension  -     Ambulatory Referral to Weight Management Program  -     Comprehensive Metabolic Panel; Future  -     Urinalysis With Microscopic If Indicated (No Culture) - Urine, Clean Catch; Future  -     Comprehensive Metabolic Panel  -     Urinalysis With Microscopic If Indicated (No Culture) - Urine, Clean Catch    Testosterone deficiency in male  -     Testosterone; Future  -     Testosterone    Class 2 severe obesity due to excess calories with serious comorbidity and " body mass index (BMI) of 36.0 to 36.9 in adult (CMS/[facility])  -     Ambulatory Referral to Weight Management Program  -     TSH; Future  -     TSH    Preventative health care  -     CBC & Differential; Future  -     Comprehensive Metabolic Panel; Future  -     Lipid Panel; Future  -     Urinalysis With Microscopic If Indicated (No Culture) - Urine, Clean Catch; Future  -     CBC & Differential  -     Comprehensive Metabolic Panel  -     Lipid Panel  -     Urinalysis With Microscopic If Indicated (No Culture) - Urine, Clean Catch  -     CBC Auto Differential  -     Manual Differential; Future  -     Manual Differential    Prediabetes vs DM - based on A1c  -     POC Glycosylated Hemoglobin (Hb A1C)  -     exenatide er (BYDUREON BCISE) 2 MG/0.85ML auto-injector injection; Inject 0.85 mL under the skin into the appropriate area as directed 1 (One) Time Per Week.        Hyperglycemia  A1c today 6.1%.  Fasting blood sugar elevated to 116.  Given his concurrent morbid obesity I would like him to start a GLP-1 agonist.  Bydureon is preferred, Ozempic may be better for weight control.    Morbid obesity  Discussed weight loss at length, counseled on calorie restriction.  Referred to weight management here at [facility], [place], operated by [facility].    Testosterone deficiency, historical  Check testosterone today    We will plan to obtain previous records both for chronic preventative care as well as those related to the current episode of care.  Any records that the patient brought with him today were reviewed personally by me during the visit today and will be scanned into the chart for posterity.    Patient Instructions   Weight Loss Tips and Recommendations:    For weight loss, weigh yourself at most every 1-2 weeks, on an empty stomach in the morning (or at least not within 8 hours of a heavy meal). It is also helpful to weigh yourself in the same clothes every time -- this might even include for every time you come to see me! For your goals, consider  "effort-based rather than results: instead of aiming for 4lbs per month, try to keep a log of your food and eat 2024-2128 calories per day. If you stick to this amount, regardless of what the scale fluctuates to, I can guarantee that you will lose weight in the form of fat. One pound of fat is approximately 3500 calories, which means cutting 500 calories per day will result in a full pound of pure fat loss. Continue to try to exercise and walk as much as possible. Fidgeting, as much as we're taught not to, can burn an extra 100-200 calories per day as well! If you stress-eat and occasionally eat impulsively, always make sure to keep a healthy snack and water on hand for when these impulses strike. A handful of almonds and 8 ounces of water are a great low-calorie, high-protein combination that controls hunger well.    If your diet includes salads, try for small changes such as swapping spinach or kale for jaylene or iceberg lettuce. Try to choose dressings that are either low in fat or that have a high ratio of unsaturated to saturated fats. These include ones with olive oil rather than soybean oil bases.    While the most helpful tips I have found for weight loss is setting discrete, achievable, actionable goals. This means focusing on specific successes that are within your control, rather than short-term results.  Just remember, that if you do the right things, do them consistently, and do them for the long-term, you will lose weight.  Make sure your goals are \"ADA\" compliant--Achievable, Discrete, and Actionable:  Achievable: It is important to be realistic.  Setting unrealistic expectations not only setting up for failure, but can even be unhealthy.  Start small with changes that will be sustainable over the long-term.  Remember, how you eat and exercise to get to a certain weight is how you'll need to eat and exercise to stay at that weight.  Discrete: Rather than say \"I want to eat better\", which is vague and " "noncommittal, make your goal something like \"I want to eat 3 servings of vegetables every day\" or \"I want to keep every meal below 600 madai\"  Actionable: Actual weight makes small fluctuations on a daily basis, impacted by things such as water retention or colonic waste retention.  For the most part, these things are out of our control.  It can be easy to do everything right but then have a little fluid retention or constipation, and suddenly despite all your efforts your weight remains the same. So instead of trying to lose say 2 pounds per week, make your goals only dependent on what you do--\"I want to walk 10,000 steps per day\" or \"I want to eat below 1500 madai per day.\"  That way he can have weekly successes and don't get to down on herself for things that you don't control.    If you remember, bring your food diary to your next appointment.  If you have a smart phone, I recommend the [employer] james for food tracking.  It includes a diary and data base of most fast food and restaurants, and even has a barcode scanner that will automatically log a food for you.  If you have a notebook for this purpose (such as from [employer] or [employer]), you can also write down a section for questions that pop up for me over the next few months.    Online Weight Loss Calculator:  [email or web address]/weight-loss-calculator    If you have any questions do not hesitate to call the office.      Return in about 2 weeks (around 6/27/2019).    Ambulatory progress note signed and attested to by Venu Bermudez D.O. on 6/26/2019 at 12:16.           "

## 2019-06-13 NOTE — PATIENT INSTRUCTIONS
"Weight Loss Tips and Recommendations:    For weight loss, weigh yourself at most every 1-2 weeks, on an empty stomach in the morning (or at least not within 8 hours of a heavy meal). It is also helpful to weigh yourself in the same clothes every time -- this might even include for every time you come to see me! For your goals, consider effort-based rather than results: instead of aiming for 4lbs per month, try to keep a log of your food and eat 2954-1297 calories per day. If you stick to this amount, regardless of what the scale fluctuates to, I can guarantee that you will lose weight in the form of fat. One pound of fat is approximately 3500 calories, which means cutting 500 calories per day will result in a full pound of pure fat loss. Continue to try to exercise and walk as much as possible. Fidgeting, as much as we're taught not to, can burn an extra 100-200 calories per day as well! If you stress-eat and occasionally eat impulsively, always make sure to keep a healthy snack and water on hand for when these impulses strike. A handful of almonds and 8 ounces of water are a great low-calorie, high-protein combination that controls hunger well.    If your diet includes salads, try for small changes such as swapping spinach or kale for jaylene or iceberg lettuce. Try to choose dressings that are either low in fat or that have a high ratio of unsaturated to saturated fats. These include ones with olive oil rather than soybean oil bases.    While the most helpful tips I have found for weight loss is setting discrete, achievable, actionable goals. This means focusing on specific successes that are within your control, rather than short-term results.  Just remember, that if you do the right things, do them consistently, and do them for the long-term, you will lose weight.  Make sure your goals are \"ADA\" compliant--Achievable, Discrete, and Actionable:  Achievable: It is important to be realistic.  Setting unrealistic " "expectations not only setting up for failure, but can even be unhealthy.  Start small with changes that will be sustainable over the long-term.  Remember, how you eat and exercise to get to a certain weight is how you'll need to eat and exercise to stay at that weight.  Discrete: Rather than say \"I want to eat better\", which is vague and noncommittal, make your goal something like \"I want to eat 3 servings of vegetables every day\" or \"I want to keep every meal below 600 madai\"  Actionable: Actual weight makes small fluctuations on a daily basis, impacted by things such as water retention or colonic waste retention.  For the most part, these things are out of our control.  It can be easy to do everything right but then have a little fluid retention or constipation, and suddenly despite all your efforts your weight remains the same. So instead of trying to lose say 2 pounds per week, make your goals only dependent on what you do--\"I want to walk 10,000 steps per day\" or \"I want to eat below 1500 madai per day.\"  That way he can have weekly successes and don't get to down on herself for things that you don't control.    If you remember, bring your food diary to your next appointment.  If you have a smart phone, I recommend the [employer] james for food tracking.  It includes a diary and data base of most fast food and restaurants, and even has a barcode scanner that will automatically log a food for you.  If you have a notebook for this purpose (such as from [employer] or [employer]), you can also write down a section for questions that pop up for me over the next few months.    Online Weight Loss Calculator:  [email or web address]/weight-loss-calculator    If you have any questions do not hesitate to call the office.  "

## 2019-06-17 ENCOUNTER — TELEPHONE (OUTPATIENT)
Dept: FAMILY MEDICINE CLINIC | Facility: CLINIC | Age: 54
End: 2019-06-17

## 2019-06-26 ENCOUNTER — OFFICE VISIT (OUTPATIENT)
Dept: FAMILY MEDICINE CLINIC | Facility: CLINIC | Age: 54
End: 2019-06-26

## 2019-06-26 VITALS
HEIGHT: 72 IN | BODY MASS INDEX: 36.16 KG/M2 | WEIGHT: 267 LBS | OXYGEN SATURATION: 97 % | HEART RATE: 82 BPM | DIASTOLIC BLOOD PRESSURE: 80 MMHG | SYSTOLIC BLOOD PRESSURE: 124 MMHG

## 2019-06-26 DIAGNOSIS — Z00.00 PREVENTATIVE HEALTH CARE: Primary | ICD-10-CM

## 2019-06-26 PROCEDURE — 99396 PREV VISIT EST AGE 40-64: CPT | Performed by: FAMILY MEDICINE

## 2019-06-28 ENCOUNTER — PRIOR AUTHORIZATION (OUTPATIENT)
Dept: FAMILY MEDICINE CLINIC | Facility: CLINIC | Age: 54
End: 2019-06-28

## 2019-10-07 ENCOUNTER — LAB (OUTPATIENT)
Dept: LAB | Facility: HOSPITAL | Age: 54
End: 2019-10-07

## 2019-10-07 ENCOUNTER — OFFICE VISIT (OUTPATIENT)
Dept: FAMILY MEDICINE CLINIC | Facility: CLINIC | Age: 54
End: 2019-10-07

## 2019-10-07 VITALS
WEIGHT: 271 LBS | BODY MASS INDEX: 36.7 KG/M2 | SYSTOLIC BLOOD PRESSURE: 110 MMHG | DIASTOLIC BLOOD PRESSURE: 80 MMHG | OXYGEN SATURATION: 98 % | HEIGHT: 72 IN | HEART RATE: 66 BPM

## 2019-10-07 DIAGNOSIS — D69.6 THROMBOCYTOPENIA (HCC): ICD-10-CM

## 2019-10-07 DIAGNOSIS — I10 ESSENTIAL HYPERTENSION: ICD-10-CM

## 2019-10-07 DIAGNOSIS — R73.03 PREDIABETES: Primary | ICD-10-CM

## 2019-10-07 LAB
BASOPHILS # BLD AUTO: 0.02 10*3/MM3 (ref 0–0.2)
BASOPHILS NFR BLD AUTO: 0.4 % (ref 0–1.5)
DEPRECATED RDW RBC AUTO: 41.3 FL (ref 37–54)
EOSINOPHIL # BLD AUTO: 0.06 10*3/MM3 (ref 0–0.4)
EOSINOPHIL NFR BLD AUTO: 1.2 % (ref 0.3–6.2)
ERYTHROCYTE [DISTWIDTH] IN BLOOD BY AUTOMATED COUNT: 12.6 % (ref 12.3–15.4)
HBA1C MFR BLD: 5.9 %
HCT VFR BLD AUTO: 45.2 % (ref 37.5–51)
HGB BLD-MCNC: 14.5 G/DL (ref 13–17.7)
IMM GRANULOCYTES # BLD AUTO: 0.04 10*3/MM3 (ref 0–0.05)
IMM GRANULOCYTES NFR BLD AUTO: 0.8 % (ref 0–0.5)
LYMPHOCYTES # BLD AUTO: 1.03 10*3/MM3 (ref 0.7–3.1)
LYMPHOCYTES NFR BLD AUTO: 21.2 % (ref 19.6–45.3)
MCH RBC QN AUTO: 28.2 PG (ref 26.6–33)
MCHC RBC AUTO-ENTMCNC: 32.1 G/DL (ref 31.5–35.7)
MCV RBC AUTO: 87.9 FL (ref 79–97)
MONOCYTES # BLD AUTO: 0.4 10*3/MM3 (ref 0.1–0.9)
MONOCYTES NFR BLD AUTO: 8.2 % (ref 5–12)
NEUTROPHILS # BLD AUTO: 3.31 10*3/MM3 (ref 1.7–7)
NEUTROPHILS NFR BLD AUTO: 68.2 % (ref 42.7–76)
NRBC BLD AUTO-RTO: 0 /100 WBC (ref 0–0.2)
PLATELET # BLD AUTO: 128 10*3/MM3 (ref 140–450)
PMV BLD AUTO: 13.9 FL (ref 6–12)
RBC # BLD AUTO: 5.14 10*6/MM3 (ref 4.14–5.8)
WBC NRBC COR # BLD: 4.86 10*3/MM3 (ref 3.4–10.8)

## 2019-10-07 PROCEDURE — 85025 COMPLETE CBC W/AUTO DIFF WBC: CPT

## 2019-10-07 PROCEDURE — 99214 OFFICE O/P EST MOD 30 MIN: CPT | Performed by: FAMILY MEDICINE

## 2019-10-07 PROCEDURE — 83036 HEMOGLOBIN GLYCOSYLATED A1C: CPT | Performed by: FAMILY MEDICINE

## 2019-10-07 RX ORDER — HYDROCODONE BITARTRATE AND IBUPROFEN 7.5; 2 MG/1; MG/1
TABLET, FILM COATED ORAL EVERY 4 HOURS PRN
COMMUNITY
Start: 2019-07-15

## 2019-10-10 ENCOUNTER — PATIENT MESSAGE (OUTPATIENT)
Dept: FAMILY MEDICINE CLINIC | Facility: CLINIC | Age: 54
End: 2019-10-10

## 2019-10-10 DIAGNOSIS — D69.6 THROMBOCYTOPENIA (HCC): Primary | ICD-10-CM

## 2019-10-16 ENCOUNTER — LAB (OUTPATIENT)
Dept: LAB | Facility: HOSPITAL | Age: 54
End: 2019-10-16

## 2019-10-16 DIAGNOSIS — D69.6 THROMBOCYTOPENIA (HCC): ICD-10-CM

## 2019-10-16 LAB — PATHOLOGY REVIEW: YES

## 2019-10-17 LAB
LAB AP CASE REPORT: NORMAL
LAB AP CLINICAL INFORMATION: NORMAL
PATH REPORT.FINAL DX SPEC: NORMAL

## 2019-10-24 DIAGNOSIS — D69.6 THROMBOCYTOPENIA (HCC): Primary | ICD-10-CM

## 2019-11-06 DIAGNOSIS — D72.89 ATYPICAL LYMPHOCYTES PRESENT ON PERIPHERAL BLOOD SMEAR: ICD-10-CM

## 2019-11-06 DIAGNOSIS — D69.6 THROMBOCYTOPENIA (HCC): Primary | ICD-10-CM

## 2019-11-08 ENCOUNTER — LAB (OUTPATIENT)
Dept: LAB | Facility: HOSPITAL | Age: 54
End: 2019-11-08

## 2019-11-08 DIAGNOSIS — D69.6 THROMBOCYTOPENIA (HCC): ICD-10-CM

## 2019-11-08 DIAGNOSIS — D72.89 ATYPICAL LYMPHOCYTES PRESENT ON PERIPHERAL BLOOD SMEAR: ICD-10-CM

## 2019-11-08 LAB — HETEROPH AB SER QL LA: NEGATIVE

## 2019-11-08 PROCEDURE — 86665 EPSTEIN-BARR CAPSID VCA: CPT

## 2019-11-08 PROCEDURE — 86038 ANTINUCLEAR ANTIBODIES: CPT

## 2019-11-08 PROCEDURE — 86308 HETEROPHILE ANTIBODY SCREEN: CPT

## 2019-11-08 PROCEDURE — 85025 COMPLETE CBC W/AUTO DIFF WBC: CPT

## 2019-11-08 PROCEDURE — 86255 FLUORESCENT ANTIBODY SCREEN: CPT

## 2019-11-08 PROCEDURE — 82784 ASSAY IGA/IGD/IGG/IGM EACH: CPT

## 2019-11-08 PROCEDURE — 85007 BL SMEAR W/DIFF WBC COUNT: CPT

## 2019-11-08 PROCEDURE — 83516 IMMUNOASSAY NONANTIBODY: CPT

## 2019-11-08 PROCEDURE — 86663 EPSTEIN-BARR ANTIBODY: CPT

## 2019-11-08 PROCEDURE — 86664 EPSTEIN-BARR NUCLEAR ANTIGEN: CPT

## 2019-11-08 PROCEDURE — 86022 PLATELET ANTIBODIES: CPT

## 2019-11-08 PROCEDURE — 36415 COLL VENOUS BLD VENIPUNCTURE: CPT

## 2019-11-09 LAB
ANISOCYTOSIS BLD QL: NORMAL
BASOPHILS # BLD MANUAL: 0.05 10*3/MM3 (ref 0–0.2)
BASOPHILS NFR BLD AUTO: 1 % (ref 0–1.5)
DEPRECATED RDW RBC AUTO: 38.7 FL (ref 37–54)
EBV EA IGG SER-ACNC: <9 U/ML (ref 0–8.9)
EBV NA IGG SER IA-ACNC: 493 U/ML (ref 0–17.9)
EBV VCA IGG SER-ACNC: >600 U/ML (ref 0–17.9)
EBV VCA IGM SER-ACNC: <36 U/ML (ref 0–35.9)
EOSINOPHIL # BLD MANUAL: 0.05 10*3/MM3 (ref 0–0.4)
EOSINOPHIL NFR BLD MANUAL: 1 % (ref 0.3–6.2)
ERYTHROCYTE [DISTWIDTH] IN BLOOD BY AUTOMATED COUNT: 12.4 % (ref 12.3–15.4)
HCT VFR BLD AUTO: 46.2 % (ref 37.5–51)
HGB BLD-MCNC: 15.9 G/DL (ref 13–17.7)
INTERPRETATION: ABNORMAL
LYMPHOCYTES # BLD MANUAL: 1.53 10*3/MM3 (ref 0.7–3.1)
LYMPHOCYTES NFR BLD MANUAL: 10 % (ref 5–12)
LYMPHOCYTES NFR BLD MANUAL: 33 % (ref 19.6–45.3)
MCH RBC QN AUTO: 29.9 PG (ref 26.6–33)
MCHC RBC AUTO-ENTMCNC: 34.4 G/DL (ref 31.5–35.7)
MCV RBC AUTO: 86.8 FL (ref 79–97)
MICROCYTES BLD QL: NORMAL
MONOCYTES # BLD AUTO: 0.46 10*3/MM3 (ref 0.1–0.9)
NEUTROPHILS # BLD AUTO: 2.55 10*3/MM3 (ref 1.7–7)
NEUTROPHILS NFR BLD MANUAL: 55 % (ref 42.7–76)
PLAT MORPH BLD: NORMAL
PLATELET # BLD AUTO: 136 10*3/MM3 (ref 140–450)
PMV BLD AUTO: 13.3 FL (ref 6–12)
RBC # BLD AUTO: 5.32 10*6/MM3 (ref 4.14–5.8)
WBC MORPH BLD: NORMAL
WBC NRBC COR # BLD: 4.64 10*3/MM3 (ref 3.4–10.8)

## 2019-11-10 LAB
GLYCOPROTEIN IV ANTIBODY: NEGATIVE
HLA AB SER QL IA: NEGATIVE
PLAT GP IA/IIA AB SER QL IA: NEGATIVE
PLAT GP IB/IX AB SER QL IA: NEGATIVE
PLAT GP IIB/IIIA AB SER QL IA: NEGATIVE

## 2019-11-11 LAB
ANA SER QL: NEGATIVE
ENDOMYSIUM IGA SER QL: NEGATIVE
GLIADIN PEPTIDE IGA SER-ACNC: 5 UNITS (ref 0–19)
GLIADIN PEPTIDE IGG SER-ACNC: 2 UNITS (ref 0–19)
IGA SERPL-MCNC: 252 MG/DL (ref 90–386)
TTG IGA SER-ACNC: <2 U/ML (ref 0–3)
TTG IGG SER-ACNC: <2 U/ML (ref 0–5)

## 2019-11-13 ENCOUNTER — PATIENT MESSAGE (OUTPATIENT)
Dept: FAMILY MEDICINE CLINIC | Facility: CLINIC | Age: 54
End: 2019-11-13

## 2019-11-13 DIAGNOSIS — D72.89 ATYPICAL LYMPHOCYTES PRESENT ON PERIPHERAL BLOOD SMEAR: ICD-10-CM

## 2019-11-13 DIAGNOSIS — R10.10 PAIN OF UPPER ABDOMEN: ICD-10-CM

## 2019-11-13 DIAGNOSIS — D69.6 THROMBOCYTOPENIA (HCC): Primary | ICD-10-CM

## 2019-11-14 NOTE — TELEPHONE ENCOUNTER
I placed an order for a complete ultrasound of the abdomen, it looks like he is already scheduled for the 22nd, can this be changed over?

## 2019-11-22 ENCOUNTER — HOSPITAL ENCOUNTER (OUTPATIENT)
Dept: ULTRASOUND IMAGING | Facility: HOSPITAL | Age: 54
Discharge: HOME | End: 2019-11-22
Admitting: FAMILY MEDICINE

## 2019-11-22 ENCOUNTER — HOSPITAL ENCOUNTER (OUTPATIENT)
Dept: ULTRASOUND IMAGING | Facility: HOSPITAL | Age: 54
End: 2019-11-22

## 2019-11-22 DIAGNOSIS — R10.10 PAIN OF UPPER ABDOMEN: ICD-10-CM

## 2019-11-22 DIAGNOSIS — D69.6 THROMBOCYTOPENIA (HCC): ICD-10-CM

## 2019-11-22 DIAGNOSIS — D72.89 ATYPICAL LYMPHOCYTES PRESENT ON PERIPHERAL BLOOD SMEAR: ICD-10-CM

## 2019-11-22 PROCEDURE — 76700 US EXAM ABDOM COMPLETE: CPT

## 2019-11-27 ENCOUNTER — TELEPHONE (OUTPATIENT)
Dept: FAMILY MEDICINE CLINIC | Facility: CLINIC | Age: 54
End: 2019-11-27

## 2019-11-27 DIAGNOSIS — N28.89 RIGHT RENAL MASS: Primary | ICD-10-CM

## 2019-11-27 NOTE — TELEPHONE ENCOUNTER
----- Message from Venu Bermudez DO sent at 11/27/2019 10:16 AM EST -----  Please call the patient regarding his radiology results.  There is a slight enlargement in the mid part of the right kidney which likely represent a normal anatomic variant.  However the ultrasound was unable to definitively characterize the abnormality, and a CT was recommended.  This is been ordered and he will be called to schedule.  Incidentally, there are kidney stones bilaterally, none of which appear to obstruct flow.

## 2019-12-10 ENCOUNTER — PATIENT MESSAGE (OUTPATIENT)
Dept: FAMILY MEDICINE CLINIC | Facility: CLINIC | Age: 54
End: 2019-12-10

## 2019-12-10 DIAGNOSIS — K58.0 IRRITABLE BOWEL SYNDROME WITH DIARRHEA: Primary | ICD-10-CM

## 2019-12-13 ENCOUNTER — TELEPHONE (OUTPATIENT)
Dept: FAMILY MEDICINE CLINIC | Facility: CLINIC | Age: 54
End: 2019-12-13

## 2019-12-13 ENCOUNTER — APPOINTMENT (OUTPATIENT)
Dept: CT IMAGING | Facility: HOSPITAL | Age: 54
End: 2019-12-13

## 2019-12-13 ENCOUNTER — HOSPITAL ENCOUNTER (OUTPATIENT)
Dept: CT IMAGING | Facility: HOSPITAL | Age: 54
Discharge: HOME | End: 2019-12-13
Admitting: FAMILY MEDICINE

## 2019-12-13 DIAGNOSIS — N28.89 RIGHT RENAL MASS: ICD-10-CM

## 2019-12-13 DIAGNOSIS — R73.03 PREDIABETES: ICD-10-CM

## 2019-12-13 LAB — CREAT BLDA-MCNC: 1 MG/DL (ref 0.6–1.3)

## 2019-12-13 PROCEDURE — 82565 ASSAY OF CREATININE: CPT

## 2019-12-13 PROCEDURE — 74170 CT ABD WO CNTRST FLWD CNTRST: CPT

## 2019-12-13 PROCEDURE — 0 IOPAMIDOL PER 1 ML: Performed by: FAMILY MEDICINE

## 2019-12-13 RX ADMIN — IOPAMIDOL 99 ML: 755 INJECTION, SOLUTION INTRAVENOUS at 14:18

## 2019-12-13 NOTE — TELEPHONE ENCOUNTER
----- Message from Tripp Lantigua sent at 2019  4:45 PM EST -----  Regarding: Prescription Question  Contact: [phone]  My metformin script has . Please call that into the [facility] pharmacy. Thanks.

## 2019-12-23 ENCOUNTER — OFFICE VISIT (OUTPATIENT)
Dept: FAMILY MEDICINE CLINIC | Facility: CLINIC | Age: 54
End: 2019-12-23

## 2019-12-23 ENCOUNTER — LAB (OUTPATIENT)
Dept: LAB | Facility: HOSPITAL | Age: 54
End: 2019-12-23

## 2019-12-23 VITALS
HEIGHT: 72 IN | HEART RATE: 69 BPM | WEIGHT: 276 LBS | BODY MASS INDEX: 37.38 KG/M2 | SYSTOLIC BLOOD PRESSURE: 122 MMHG | DIASTOLIC BLOOD PRESSURE: 78 MMHG | OXYGEN SATURATION: 98 %

## 2019-12-23 DIAGNOSIS — R73.03 PREDIABETES: ICD-10-CM

## 2019-12-23 DIAGNOSIS — E66.01 CLASS 2 SEVERE OBESITY DUE TO EXCESS CALORIES WITH SERIOUS COMORBIDITY AND BODY MASS INDEX (BMI) OF 36.0 TO 36.9 IN ADULT (HCC): Primary | ICD-10-CM

## 2019-12-23 DIAGNOSIS — E66.01 CLASS 2 SEVERE OBESITY DUE TO EXCESS CALORIES WITH SERIOUS COMORBIDITY AND BODY MASS INDEX (BMI) OF 36.0 TO 36.9 IN ADULT (HCC): ICD-10-CM

## 2019-12-23 LAB — CRP SERPL-MCNC: 0.15 MG/DL (ref 0.01–0.5)

## 2019-12-23 PROCEDURE — 99214 OFFICE O/P EST MOD 30 MIN: CPT | Performed by: FAMILY MEDICINE

## 2019-12-23 PROCEDURE — 86141 C-REACTIVE PROTEIN HS: CPT

## 2019-12-23 RX ORDER — TOPIRAMATE 50 MG/1
50 TABLET, FILM COATED ORAL 2 TIMES DAILY
Qty: 60 TABLET | Refills: 2 | Status: SHIPPED | OUTPATIENT
Start: 2019-12-23 | End: 2020-07-30

## 2019-12-23 NOTE — PROGRESS NOTES
Subjective   Tripp Lantigua is a 54 y.o. male.     Chief Complaint   Patient presents with   • Follow-up       History of Present Illness     Tripp Lantigua presents today for   Chief Complaint   Patient presents with   • Follow-up     Typically eats a burrito in the AM, from store. He feels like he is always hungry. He has an unknown/undifferentiated autoimmune condition. He is exercising 5 days per week, he just feels like his weight isn't budging. He feels strong and good overall, just weight is elevating.    This patient is accompanied by their self who contributes to the history of their care.    The following portions of the patient's history were reviewed and updated as appropriate: allergies, current medications, past family history, past medical history, past social history, past surgical history and problem list.    Active Ambulatory Problems     Diagnosis Date Noted   • Diverticulitis 08/15/2018   • S/P LAR with loop ileostomy 08/15/2018   • HTN (hypertension) 08/15/2018   • Prediabetes vs DM - based on A1c 08/15/2018   • Acute blood loss anemia, mild 08/15/2018   • Leukocytosis, likely reactive 08/15/2018     Resolved Ambulatory Problems     Diagnosis Date Noted   • No Resolved Ambulatory Problems     Past Medical History:   Diagnosis Date   • Arthritis    • Chronic bronchitis (CMS/HCC)    • Colitis    • Colon polyp    • Diverticulosis    • GERD (gastroesophageal reflux disease)    • Hypertension    • IBS (irritable bowel syndrome)    • Kidney stone      Past Surgical History:   Procedure Laterality Date   • COLON RESECTION N/A 8/15/2018    Procedure: OPEN LOW ANTERIOR RESECTION WITH TAKEDOWN OF COLOVESICAL FISTULA, MOBILIZATION OF SPLENIC FLEXIURE, APPENDECTOMY, AND ILEOSTOMY;  Surgeon: Daniel Cline MD;  Location: [facility];  Service: General   • COLONOSCOPY     • CYSTOSCOPY Bilateral 8/15/2018    Procedure: CYSTOSCOPY WITH BILATERAL URETERAL STENT PLACEMENT;  Surgeon: Daniel Cline MD;  Location:  " [facility] OR;  Service: General   • JOINT REPLACEMENT Bilateral     hip     Family History   Problem Relation Age of Onset   • Diverticulosis Mother    • Cancer Father    • Diverticulosis Father    • Cancer Maternal Grandfather      Social History     Socioeconomic History   • Marital status:      Spouse name: Not on file   • Number of children: Not on file   • Years of education: Not on file   • Highest education level: Not on file   Tobacco Use   • Smoking status: Never Smoker   • Smokeless tobacco: Never Used   Substance and Sexual Activity   • Alcohol use: Yes     Comment: 4x a week    • Drug use: No   • Sexual activity: Defer       Review of Systems  Review of Systems -  General ROS: negative for - chills, fever or night sweats  Cardiovascular ROS: no chest pain or dyspnea on exertion  Gastrointestinal ROS: no abdominal pain, change in bowel habits, or black or bloody stools  Genito-Urinary ROS: no dysuria, trouble voiding, or hematuria    Objective   Blood pressure 122/78, pulse 69, height 182.9 cm (72.01\"), weight 125 kg (276 lb), SpO2 98 %.  Nursing note reviewed  Physical Exam  Const: NAD, A&Ox4, Pleasant, Cooperative  Eyes: EOMI, no conjunctivitis  ENT: No nasal discharge present, neck supple  Cardiac: Regular rate and rhythm, no cyanosis  Resp: Respiratory rate within normal limits, no increased work of breathing, no audible wheezing or retractions noted  GI: No distention or ascites  MSK: Motor and sensation grossly intact in bilateral upper extremities  Neurologic: CN II-XII grossly intact  Psych: Appropriate mood and behavior.  Skin: Warm, dry  Procedures  Assessment/Plan   Problem List Items Addressed This Visit        Other    Prediabetes vs DM - based on A1c    Relevant Medications    topiramate (TOPAMAX) 50 MG tablet    Other Relevant Orders    High Sensitivity CRP      Other Visit Diagnoses     Class 2 severe obesity due to excess calories with serious comorbidity and body mass index (BMI) of " 36.0 to 36.9 in adult (CMS/[facility])    -  Primary    Relevant Medications    topiramate (TOPAMAX) 50 MG tablet    Other Relevant Orders    High Sensitivity CRP        #1 obesity  He struggles mostly with eating at night, he typically eats a burrito in the morning and fast throughout the rest of the day.  We discussed eating throughout the day so that he does not binge at night.  He has had difficulty sleeping the past, I would like to avoid stimulants if possible.  We discussed topiramate, would like him to start at 50 mg twice daily for appetite suppression.    See patient diagnoses and orders along with patient instructions for assessment, plan, and changes to care for patient.    I have spent 25 minutes face-to-face with 15 minutes spent counseling the patient on the diagnoses, possible treatment options and outcomes, adjunctive and alternative treatments, and developing a care plan.    There are no Patient Instructions on file for this visit.    Return in about 6 weeks (around 2/3/2020) for weight loss.    Ambulatory progress note signed and attested to by Venu Bermudez D.O.

## 2020-07-24 DIAGNOSIS — R73.03 PREDIABETES: ICD-10-CM

## 2020-07-30 ENCOUNTER — LAB (OUTPATIENT)
Dept: LAB | Facility: HOSPITAL | Age: 55
End: 2020-07-30

## 2020-07-30 ENCOUNTER — OFFICE VISIT (OUTPATIENT)
Dept: FAMILY MEDICINE CLINIC | Facility: CLINIC | Age: 55
End: 2020-07-30

## 2020-07-30 VITALS
HEIGHT: 72 IN | HEART RATE: 76 BPM | BODY MASS INDEX: 35.35 KG/M2 | SYSTOLIC BLOOD PRESSURE: 124 MMHG | OXYGEN SATURATION: 98 % | WEIGHT: 261 LBS | DIASTOLIC BLOOD PRESSURE: 84 MMHG

## 2020-07-30 DIAGNOSIS — N20.0 LEFT RENAL STONE: ICD-10-CM

## 2020-07-30 DIAGNOSIS — E66.01 CLASS 2 SEVERE OBESITY DUE TO EXCESS CALORIES WITH SERIOUS COMORBIDITY AND BODY MASS INDEX (BMI) OF 36.0 TO 36.9 IN ADULT (HCC): ICD-10-CM

## 2020-07-30 DIAGNOSIS — N28.89 RIGHT RENAL MASS: ICD-10-CM

## 2020-07-30 DIAGNOSIS — E78.2 MODERATE MIXED HYPERLIPIDEMIA NOT REQUIRING STATIN THERAPY: ICD-10-CM

## 2020-07-30 DIAGNOSIS — R73.03 PREDIABETES: Primary | ICD-10-CM

## 2020-07-30 LAB
ALBUMIN SERPL-MCNC: 4.5 G/DL (ref 3.5–5.2)
ALBUMIN/GLOB SERPL: 1.7 G/DL
ALP SERPL-CCNC: 57 U/L (ref 39–117)
ALT SERPL W P-5'-P-CCNC: 24 U/L (ref 1–41)
ANION GAP SERPL CALCULATED.3IONS-SCNC: 9 MMOL/L (ref 5–15)
AST SERPL-CCNC: 20 U/L (ref 1–40)
BILIRUB SERPL-MCNC: 0.5 MG/DL (ref 0–1.2)
BUN SERPL-MCNC: 14 MG/DL (ref 6–20)
BUN/CREAT SERPL: 14.6 (ref 7–25)
CALCIUM SPEC-SCNC: 9.7 MG/DL (ref 8.6–10.5)
CHLORIDE SERPL-SCNC: 105 MMOL/L (ref 98–107)
CHOLEST SERPL-MCNC: 178 MG/DL (ref 0–200)
CO2 SERPL-SCNC: 26 MMOL/L (ref 22–29)
CREAT SERPL-MCNC: 0.96 MG/DL (ref 0.76–1.27)
GFR SERPL CREATININE-BSD FRML MDRD: 81 ML/MIN/1.73
GLOBULIN UR ELPH-MCNC: 2.7 GM/DL
GLUCOSE SERPL-MCNC: 125 MG/DL (ref 65–99)
HBA1C MFR BLD: 5.9 %
HDLC SERPL-MCNC: 51 MG/DL (ref 40–60)
LDLC SERPL CALC-MCNC: 105 MG/DL (ref 0–100)
LDLC/HDLC SERPL: 2.06 {RATIO}
POTASSIUM SERPL-SCNC: 5 MMOL/L (ref 3.5–5.2)
PROT SERPL-MCNC: 7.2 G/DL (ref 6–8.5)
SODIUM SERPL-SCNC: 140 MMOL/L (ref 136–145)
TRIGL SERPL-MCNC: 109 MG/DL (ref 0–150)
VLDLC SERPL-MCNC: 21.8 MG/DL (ref 5–40)

## 2020-07-30 PROCEDURE — 83036 HEMOGLOBIN GLYCOSYLATED A1C: CPT | Performed by: FAMILY MEDICINE

## 2020-07-30 PROCEDURE — 80061 LIPID PANEL: CPT | Performed by: FAMILY MEDICINE

## 2020-07-30 PROCEDURE — 99214 OFFICE O/P EST MOD 30 MIN: CPT | Performed by: FAMILY MEDICINE

## 2020-07-30 PROCEDURE — 80053 COMPREHEN METABOLIC PANEL: CPT | Performed by: FAMILY MEDICINE

## 2020-08-12 ENCOUNTER — PATIENT MESSAGE (OUTPATIENT)
Dept: FAMILY MEDICINE CLINIC | Facility: CLINIC | Age: 55
End: 2020-08-12

## 2022-05-13 ENCOUNTER — OFFICE VISIT (OUTPATIENT)
Dept: FAMILY MEDICINE CLINIC | Facility: CLINIC | Age: 57
End: 2022-05-13

## 2022-05-13 VITALS
WEIGHT: 264.2 LBS | HEART RATE: 84 BPM | RESPIRATION RATE: 18 BRPM | HEIGHT: 72 IN | DIASTOLIC BLOOD PRESSURE: 76 MMHG | SYSTOLIC BLOOD PRESSURE: 124 MMHG | OXYGEN SATURATION: 94 % | BODY MASS INDEX: 35.78 KG/M2

## 2022-05-13 DIAGNOSIS — E78.5 DYSLIPIDEMIA, GOAL LDL BELOW 70: ICD-10-CM

## 2022-05-13 DIAGNOSIS — E55.9 VITAMIN D DEFICIENCY: ICD-10-CM

## 2022-05-13 DIAGNOSIS — R73.03 PREDIABETES: ICD-10-CM

## 2022-05-13 DIAGNOSIS — M25.512 CHRONIC PAIN OF BOTH SHOULDERS: ICD-10-CM

## 2022-05-13 DIAGNOSIS — M25.511 CHRONIC PAIN OF BOTH SHOULDERS: ICD-10-CM

## 2022-05-13 DIAGNOSIS — G89.29 CHRONIC PAIN OF BOTH SHOULDERS: ICD-10-CM

## 2022-05-13 DIAGNOSIS — J06.9 ACUTE URI: ICD-10-CM

## 2022-05-13 DIAGNOSIS — E78.2 MODERATE MIXED HYPERLIPIDEMIA NOT REQUIRING STATIN THERAPY: ICD-10-CM

## 2022-05-13 DIAGNOSIS — I10 PRIMARY HYPERTENSION: ICD-10-CM

## 2022-05-13 DIAGNOSIS — E34.9 TESTOSTERONE DEFICIENCY: ICD-10-CM

## 2022-05-13 DIAGNOSIS — N28.89 RENAL MASS, RIGHT: ICD-10-CM

## 2022-05-13 DIAGNOSIS — E11.9 TYPE 2 DIABETES MELLITUS WITHOUT COMPLICATION, WITHOUT LONG-TERM CURRENT USE OF INSULIN: ICD-10-CM

## 2022-05-13 DIAGNOSIS — Z12.11 COLON CANCER SCREENING: ICD-10-CM

## 2022-05-13 DIAGNOSIS — E66.01 CLASS 2 SEVERE OBESITY DUE TO EXCESS CALORIES WITH SERIOUS COMORBIDITY AND BODY MASS INDEX (BMI) OF 36.0 TO 36.9 IN ADULT: ICD-10-CM

## 2022-05-13 DIAGNOSIS — Z00.00 PREVENTATIVE HEALTH CARE: Primary | ICD-10-CM

## 2022-05-13 DIAGNOSIS — Z13.6 SCREENING FOR CARDIOVASCULAR CONDITION: ICD-10-CM

## 2022-05-13 PROBLEM — D62 ACUTE BLOOD LOSS ANEMIA: Status: RESOLVED | Noted: 2018-08-15 | Resolved: 2022-05-13

## 2022-05-13 PROBLEM — D72.829 LEUKOCYTOSIS: Status: RESOLVED | Noted: 2018-08-15 | Resolved: 2022-05-13

## 2022-05-13 LAB
EXPIRATION DATE: NORMAL
HBA1C MFR BLD: 6.9 %
Lab: NORMAL

## 2022-05-13 PROCEDURE — 99215 OFFICE O/P EST HI 40 MIN: CPT | Performed by: FAMILY MEDICINE

## 2022-05-13 PROCEDURE — 83036 HEMOGLOBIN GLYCOSYLATED A1C: CPT | Performed by: FAMILY MEDICINE

## 2022-05-13 RX ORDER — BROMPHENIRAMINE MALEATE, PSEUDOEPHEDRINE HYDROCHLORIDE, AND DEXTROMETHORPHAN HYDROBROMIDE 2; 30; 10 MG/5ML; MG/5ML; MG/5ML
5 SYRUP ORAL 4 TIMES DAILY PRN
Qty: 118 ML | Refills: 1 | Status: SHIPPED | OUTPATIENT
Start: 2022-05-13 | End: 2022-05-20

## 2022-05-13 RX ORDER — LISINOPRIL 10 MG/1
10 TABLET ORAL DAILY
Qty: 90 TABLET | Refills: 3 | Status: SHIPPED | OUTPATIENT
Start: 2022-05-13

## 2022-05-13 RX ORDER — SEMAGLUTIDE 1.34 MG/ML
0.5 INJECTION, SOLUTION SUBCUTANEOUS WEEKLY
Qty: 1 PEN | Refills: 2 | Status: SHIPPED | OUTPATIENT
Start: 2022-05-13 | End: 2022-07-19

## 2022-05-13 NOTE — PROGRESS NOTES
Annual Well Adult Visit     Patient Name: Tripp Lantigua  : 1965   MRN: [id number]   Care Team: Patient Care Team:  Venu Bermudez DO as PCP - General (Family Medicine)    Chief Complaint:    Chief Complaint   Patient presents with   • Annual Exam   • Prediabetes       History of Present Illness: Tripp Lantigua is a 57 y.o. male who presents today for annual physical exam and preventative care.    HPI    The patient presents today for an annual well adult exam. His last visit was in 2020. At that time, he was seen for a refill on his metformin for prediabetes. His HgA1c at that time was 5.9 percent. He reports that he feels good overall, and brought in blood work results that he paid for in regards to weight loss; he is unable to lose weight.      The patient reports that he started having headaches recently, with green nasal mucus and assumed it to be a sinus infection that has since migrated to his chest. He denies any dental pain or facial pain. He states that he felt feverish yesterday, but not today, and denies coming in contact with anyone positive for COVID-19. He confirms that he has had both COVID-19 vaccines and a booster.     The patient confirms that he has a colonoscopy scheduled on 2022 with Dr. Cline. He states that he had no colon issues to report since his resection.     The patient states that the resection surgery has affected his HgA1c negatively and has been unable to decrease it to the 5 percentiles. He reports that his HgA1c was 6.32 percent back in 10/2022. He mentions progressively, that his HgA1c decreased slightly to 6.9 percent today from the last result. He adds that the hospitalization with a fistula, leakage, and subsequent infections has impacted his blood glucose levels. He reports that he maintains a low carbohydrate diet, with salads every day, grilled chicken, and a hamburger without bread. He states that he was taking metformin was either the end of ,  or the beginning of 2021.      The patient confirms that he is still taking lisinopril for his hypertension that was prescribed by Dr. Roca who has since retired. He states that in addition to his gastroenterological problems, he was also seeing Dr. Roca for hips and knee issues. He also confirms that he is taking testosterone once weekly to maintain his levels in the 300s ng/dL originally prescribed by Dr. Roca as well. He questions if he can have a coronary artery calcium score screening. The patient states that he doesn't think that his cholesterol is bad.    The patient denies any family history of medullary thyroid cancer or multiple endocrine neoplasia type II.    This patient is accompanied by their self who contributes to the history of their care.    The following portions of the patient's history were reviewed and updated as appropriate: allergies, current medications, past family history, past medical history, past social history, past surgical history and problem list.    Subjective      Review of Systems:   Review of Systems - See HPI    Past Medical History:   Past Medical History:   Diagnosis Date   • Allergic 1996   • Arthritis    • Chronic bronchitis (HCC)    • Colitis    • Colon polyp    • Diverticulitis    • Diverticulosis    • GERD (gastroesophageal reflux disease)    • Hypertension    • IBS (irritable bowel syndrome)    • Kidney stone        Past Surgical History:   Past Surgical History:   Procedure Laterality Date   • COLON RESECTION N/A 8/15/2018    Procedure: OPEN LOW ANTERIOR RESECTION WITH TAKEDOWN OF COLOVESICAL FISTULA, MOBILIZATION OF SPLENIC FLEXIURE, APPENDECTOMY, AND ILEOSTOMY;  Surgeon: Daniel Cline MD;  Location:  [facility];  Service: General   • COLONOSCOPY     • CYSTOSCOPY Bilateral 8/15/2018    Procedure: CYSTOSCOPY WITH BILATERAL URETERAL STENT PLACEMENT;  Surgeon: Daniel Cline MD;  Location:  [facility];  Service: General   • JOINT REPLACEMENT Bilateral     hip        Family History:   Family History   Problem Relation Age of Onset   • Diverticulosis Mother    • Cancer Father    • Diverticulosis Father    • Cancer Maternal Grandfather        Social History:   Social History     Socioeconomic History   • Marital status:    Tobacco Use   • Smoking status: Never Smoker   • Smokeless tobacco: Never Used   Vaping Use   • Vaping Use: Never used   Substance and Sexual Activity   • Alcohol use: Yes     Alcohol/week: 16.0 standard drinks     Types: 16 Shots of liquor per week     Comment: Average 16 drinks a week   • Drug use: No   • Sexual activity: Defer       Tobacco History:   Social History     Tobacco Use   Smoking Status Never Smoker   Smokeless Tobacco Never Used       Medications:     Current Outpatient Medications:   •  aspirin 81 MG chewable tablet, Chew 81 mg Daily., Disp: , Rfl:   •  B Complex Vitamins (VITAMIN B COMPLEX PO), Take  by mouth., Disp: , Rfl:   •  BREO ELLIPTA 200-25 MCG/INH inhaler, , Disp: , Rfl:   •  diphenhydrAMINE (BENADRYL) 25 MG tablet, Take 25 mg by mouth Every 6 (Six) Hours As Needed for Itching., Disp: , Rfl:   •  fluticasone (FLONASE) 50 MCG/ACT nasal spray, , Disp: , Rfl:   •  Glucosamine-Chondroit-Vit C-Mn (GLUCOSAMINE 1500 COMPLEX) capsule, Take 1 capsule by mouth Daily., Disp: , Rfl:   •  HYDROcodone-ibuprofen (VICOPROFEN) 7.5-200 MG per tablet, , Disp: , Rfl:   •  Krill Oil 1000 MG capsule, Take  by mouth., Disp: , Rfl:   •  L-GLUTAMINE PO, Take  by mouth., Disp: , Rfl:   •  lisinopril (PRINIVIL,ZESTRIL) 10 MG tablet, Take 1 tablet by mouth Daily., Disp: 90 tablet, Rfl: 3  •  Melatonin 10 MG tablet, Take  by mouth As Needed., Disp: , Rfl:   •  Milk Thistle 1000 MG capsule, Take  by mouth., Disp: , Rfl:   •  Multiple Vitamins-Minerals (MULTIVITAMIN WITH MINERALS) tablet tablet, Take 1 tablet by mouth Daily., Disp: , Rfl:   •  tadalafil (CIALIS) 5 MG tablet, Take 5 mg by mouth Daily As Needed for erectile dysfunction., Disp: , Rfl:   •   "vitamin C (ASCORBIC ACID) 500 MG tablet, Take 500 mg by mouth Daily., Disp: , Rfl:   •  ANDROGEL PUMP 20.25 MG/ACT (1.62%) gel, , Disp: , Rfl:   •  brompheniramine-pseudoephedrine-DM 30-2-10 MG/5ML syrup, Take 5 mL by mouth 4 (Four) Times a Day As Needed for Allergies (mucous) for up to 7 days., Disp: 118 mL, Rfl: 1  •  Ozempic, 0.25 or 0.5 MG/DOSE, 2 MG/1.5ML solution pen-injector, Inject 0.5 mg under the skin into the appropriate area as directed 1 (One) Time Per Week., Disp: 1 pen, Rfl: 2    Allergies:   Allergies   Allergen Reactions   • Metformin GI Intolerance   • Acetaminophen Other (See Comments)     GI upset  GI upset         Objective   Objective     Physical Exam:  Vital Signs:   Vitals:    05/13/22 0916   BP: 124/76   Pulse: 84   Resp: 18   SpO2: 94%   Weight: 120 kg (264 lb 3.2 oz)   Height: 182.9 cm (72.01\")     Body mass index is 35.82 kg/m².     Physical Exam  Nursing note reviewed  General: Patient is well-nourished, well-developed, and in no acute distress.  HEENT: Normocephalic with no contusions noted, no ptosis or palsy. Pupils equally round and reactive to light, extraocular movements intact. Patient holds steady gaze, can follow to midline. Hearing grossly normal without deficit, exterior auricles normal, tympanic membranes normal without erythema or bulging.  Lymphatic: Posterior auricular, cervical, submandibular, supraclavicular, axillary lymphatic sites palpated without abnormality  Cardiovascular: Normal study rate without ectopy. PMI palpated, normal. Normal S1, S2. No murmurs rubs or gallops.  Respiratory: No tenderness to palpation on the chest wall, lungs clear to auscultation bilaterally, no wheezes, rales, or rhonchi. No pleural friction rubs.  Gastrointestinal: Bowel sounds present, normoactive globally. No bruit noted. Nontender, nondistended. Normal percussive exam, no hepatomegaly, no splenomegaly. No hernias, scars, gross lesions.  Extremities: No cyanosis or edema, 2+ pulses " bilaterally, reflexes normal. Capillary refill time normal.  MSK: Normal gait and station. 5/5 strength globally.  Neuro: Cranial nerves II-XII grossly intact. Patient alert and oriented x3.  PHQ-9 Depression Screening  Little interest or pleasure in doing things?     Feeling down, depressed, or hopeless?     Trouble falling or staying asleep, or sleeping too much?     Feeling tired or having little energy?     Poor appetite or overeating?     Feeling bad about yourself - or that you are a failure or have let yourself or your family down?     Trouble concentrating on things, such as reading the newspaper or watching television?     Moving or speaking so slowly that other people could have noticed? Or the opposite - being so fidgety or restless that you have been moving around a lot more than usual?     Thoughts that you would be better off dead, or of hurting yourself in some way?     PHQ-9 Total Score     If you checked off any problems, how difficult have these problems made it for you to do your work, take care of things at home, or get along with other people?       Procedures/Radiology     Procedures  No radiology results for the last 7 days     Assessment & Plan   Assessment / Plan      Assessment/Plan:   Problems Addressed This Visit  Diagnoses and all orders for this visit:    1. Preventative health care (Primary)    2. Prediabetes vs DM - based on A1c  Comments:  - His HgA1c is 6.9 percent today.  - We will recheck his cholesterol and blood sugar in 3 months.  Assessment & Plan:  A1c today 6.9%. Did not tolerate metformin in 2020 due to severe GI intolerance (s/p resection, relatively contraindicated for metformin).    Orders:  -     POC Glycosylated Hemoglobin (Hb A1C)    3. Class 2 severe obesity due to excess calories with serious comorbidity and body mass index (BMI) of 36.0 to 36.9 in adult (HCC)    4. Moderate mixed hyperlipidemia not requiring statin therapy    5. Primary hypertension  Assessment &  Plan:  - He will continue taking lisinopril as prescribed.    Orders:  -     lisinopril (PRINIVIL,ZESTRIL) 10 MG tablet; Take 1 tablet by mouth Daily.  Dispense: 90 tablet; Refill: 3    6. Acute URI  Comments:  - I will send in symptomatic medications to try and help clear up some of the mucus and congestion.No indication for antibiotics currently. Bromfed DM.  Orders:  -     brompheniramine-pseudoephedrine-DM 30-2-10 MG/5ML syrup; Take 5 mL by mouth 4 (Four) Times a Day As Needed for Allergies (mucous) for up to 7 days.  Dispense: 118 mL; Refill: 1    7. Colon cancer screening  Comments:  Scheduled for 5/19/22 with Dr. Cline    8. Type 2 diabetes mellitus without complication, without long-term current use of insulin ([facility])  Assessment & Plan:  A1c today 6.9%. Did not tolerate metformin in 2020 due to severe GI intolerance (s/p resection, relatively contraindicated for metformin).    Orders:  -     Ozempic, 0.25 or 0.5 MG/DOSE, 2 MG/1.5ML solution pen-injector; Inject 0.5 mg under the skin into the appropriate area as directed 1 (One) Time Per Week.  Dispense: 1 pen; Refill: 2  -     CT Cardiac Calcium Score Without Dye; Future    9. Vitamin D deficiency    10. Testosterone deficiency  -     Testosterone; Future    11. Screening for cardiovascular condition  -     CT Cardiac Calcium Score Without Dye; Future    12. Dyslipidemia, goal LDL below 70    13. Renal mass, right  -     CT Abdomen With & Without Contrast; Future    14. Chronic pain of both shoulders  -     XR Shoulder 2+ View Bilateral; Future    Problem List Items Addressed This Visit        Cardiac and Vasculature    HTN (hypertension) (Chronic)    Overview     Lisinopril 10mg           Current Assessment & Plan     - He will continue taking lisinopril as prescribed.           Relevant Medications    lisinopril (PRINIVIL,ZESTRIL) 10 MG tablet    Dyslipidemia, goal LDL below 70       Endocrine and Metabolic    Type 2 diabetes mellitus, without long-term  current use of insulin ([facility])    Current Assessment & Plan     A1c today 6.9%. Did not tolerate metformin in 2020 due to severe GI intolerance (s/p resection, relatively contraindicated for metformin).           Relevant Medications    Ozempic, 0.25 or 0.5 MG/DOSE, 2 MG/1.5ML solution pen-injector    Other Relevant Orders    CT Cardiac Calcium Score Without Dye    Vitamin D deficiency      Other Visit Diagnoses     Preventative health care    -  Primary    Class 2 severe obesity due to excess calories with serious comorbidity and body mass index (BMI) of 36.0 to 36.9 in adult ([facility])        Moderate mixed hyperlipidemia not requiring statin therapy        Acute URI        - I will send in symptomatic medications to try and help clear up some of the mucus and congestion.No indication for antibiotics currently. Bromfed DM.    Relevant Medications    brompheniramine-pseudoephedrine-DM 30-2-10 MG/5ML syrup    Colon cancer screening        Scheduled for 5/19/22 with Dr. Cline    Testosterone deficiency        Relevant Orders    Testosterone    Screening for cardiovascular condition        Relevant Orders    CT Cardiac Calcium Score Without Dye    Renal mass, right        Relevant Orders    CT Abdomen With & Without Contrast    Chronic pain of both shoulders        Relevant Orders    XR Shoulder 2+ View Bilateral        Low back pain.  - He will continue taking testosterone as prescribed.        See patient diagnoses and orders along with patient instructions for assessment, plan, and changes to care for patient.    The preventative exam has been reviewed in detail.  The patient has been fully counseled on preventative guidelines for vaccines, cancer screenings, and other health maintenance needs. The patient was counseled on maintaining a lifestyle to promote good health and to minimize chronic diseases.  The patient has been assisted with scheduling healthcare procedures for the coming year and given a written document  outlining these recommendations. Age-appropriate screening measures have been ordered for the patient today as indicated above.    There are no Patient Instructions on file for this visit.    Follow Up:   Return in about 1 month (around 6/13/2022) for Annual.    I spent 51 minutes caring for Tripp on this date of service. This time includes time spent by me in the following activities:preparing for the visit, reviewing tests, counseling and educating the patient/family/caregiver, ordering medications, tests, or procedures, documenting information in the medical record and independently interpreting results and communicating that information with the patient/family/caregiver    DO SALUD Sánchez RD  [facility]  [address]  [facility] [address]  Fax [phone]  Phone [phone]       Transcribed from ambient dictation for Venu Bremudez DO by Freida Fisher.  05/13/22   11:08 EDT    Patient verbalized consent to the visit recording.

## 2022-05-13 NOTE — ASSESSMENT & PLAN NOTE
A1c today 6.9%. Did not tolerate metformin in 2020 due to severe GI intolerance (s/p resection, relatively contraindicated for metformin).

## 2022-05-17 DIAGNOSIS — R05.9 COUGH: Primary | ICD-10-CM

## 2022-05-17 RX ORDER — BENZONATATE 100 MG/1
200 CAPSULE ORAL 3 TIMES DAILY PRN
Qty: 45 CAPSULE | Refills: 0 | Status: SHIPPED | OUTPATIENT
Start: 2022-05-17 | End: 2022-07-19

## 2022-06-14 ENCOUNTER — PATIENT MESSAGE (OUTPATIENT)
Dept: FAMILY MEDICINE CLINIC | Facility: CLINIC | Age: 57
End: 2022-06-14

## 2022-06-14 ENCOUNTER — APPOINTMENT (OUTPATIENT)
Dept: CT IMAGING | Facility: HOSPITAL | Age: 57
End: 2022-06-14

## 2022-06-14 ENCOUNTER — HOSPITAL ENCOUNTER (OUTPATIENT)
Dept: CT IMAGING | Facility: HOSPITAL | Age: 57
Discharge: HOME | End: 2022-06-14
Admitting: FAMILY MEDICINE

## 2022-06-14 DIAGNOSIS — E11.9 TYPE 2 DIABETES MELLITUS WITHOUT COMPLICATION, WITHOUT LONG-TERM CURRENT USE OF INSULIN: ICD-10-CM

## 2022-06-14 DIAGNOSIS — M25.562 ACUTE PAIN OF LEFT KNEE: ICD-10-CM

## 2022-06-14 DIAGNOSIS — Z13.6 SCREENING FOR CARDIOVASCULAR CONDITION: ICD-10-CM

## 2022-06-14 DIAGNOSIS — N28.89 RENAL MASS, RIGHT: ICD-10-CM

## 2022-06-14 DIAGNOSIS — I25.10 CORONARY ARTERY DISEASE INVOLVING NATIVE HEART WITHOUT ANGINA PECTORIS, UNSPECIFIED VESSEL OR LESION TYPE: Primary | ICD-10-CM

## 2022-06-14 PROCEDURE — 74170 CT ABD WO CNTRST FLWD CNTRST: CPT

## 2022-06-14 PROCEDURE — 75571 CT HRT W/O DYE W/CA TEST: CPT

## 2022-06-14 PROCEDURE — 0 IOPAMIDOL PER 1 ML: Performed by: FAMILY MEDICINE

## 2022-06-14 PROCEDURE — 82565 ASSAY OF CREATININE: CPT

## 2022-06-14 RX ADMIN — IOPAMIDOL 85 ML: 755 INJECTION, SOLUTION INTRAVENOUS at 14:58

## 2022-06-15 ENCOUNTER — APPOINTMENT (OUTPATIENT)
Dept: CT IMAGING | Facility: HOSPITAL | Age: 57
End: 2022-06-15

## 2022-06-28 ENCOUNTER — APPOINTMENT (OUTPATIENT)
Dept: CT IMAGING | Facility: HOSPITAL | Age: 57
End: 2022-06-28

## 2022-07-07 ENCOUNTER — PRIOR AUTHORIZATION (OUTPATIENT)
Dept: FAMILY MEDICINE CLINIC | Facility: CLINIC | Age: 57
End: 2022-07-07

## 2022-07-08 LAB — CREAT BLDA-MCNC: 0.9 MG/DL (ref 0.6–1.3)

## 2022-07-12 ENCOUNTER — OFFICE VISIT (OUTPATIENT)
Dept: ORTHOPEDIC SURGERY | Facility: CLINIC | Age: 57
End: 2022-07-12

## 2022-07-12 VITALS
BODY MASS INDEX: 35.57 KG/M2 | HEIGHT: 72 IN | DIASTOLIC BLOOD PRESSURE: 78 MMHG | SYSTOLIC BLOOD PRESSURE: 146 MMHG | WEIGHT: 262.6 LBS

## 2022-07-12 DIAGNOSIS — M17.12 PRIMARY OSTEOARTHRITIS OF LEFT KNEE: Primary | ICD-10-CM

## 2022-07-12 DIAGNOSIS — M25.562 PAIN IN BOTH KNEES, UNSPECIFIED CHRONICITY: ICD-10-CM

## 2022-07-12 DIAGNOSIS — M25.561 PAIN IN BOTH KNEES, UNSPECIFIED CHRONICITY: ICD-10-CM

## 2022-07-12 DIAGNOSIS — M17.11 PRIMARY OSTEOARTHRITIS OF RIGHT KNEE: ICD-10-CM

## 2022-07-12 PROCEDURE — 99204 OFFICE O/P NEW MOD 45 MIN: CPT | Performed by: ORTHOPAEDIC SURGERY

## 2022-07-12 PROCEDURE — 20610 DRAIN/INJ JOINT/BURSA W/O US: CPT | Performed by: ORTHOPAEDIC SURGERY

## 2022-07-12 RX ORDER — LIDOCAINE HYDROCHLORIDE 10 MG/ML
3 INJECTION, SOLUTION EPIDURAL; INFILTRATION; INTRACAUDAL; PERINEURAL
Status: COMPLETED | OUTPATIENT
Start: 2022-07-12 | End: 2022-07-12

## 2022-07-12 RX ORDER — FLUTICASONE PROPIONATE 50 MCG
1 SPRAY, SUSPENSION (ML) NASAL
COMMUNITY
Start: 2022-07-06 | End: 2022-11-22 | Stop reason: SDUPTHER

## 2022-07-12 RX ORDER — TRIAMCINOLONE ACETONIDE 40 MG/ML
80 INJECTION, SUSPENSION INTRA-ARTICULAR; INTRAMUSCULAR
Status: COMPLETED | OUTPATIENT
Start: 2022-07-12 | End: 2022-07-12

## 2022-07-12 RX ADMIN — TRIAMCINOLONE ACETONIDE 80 MG: 40 INJECTION, SUSPENSION INTRA-ARTICULAR; INTRAMUSCULAR at 14:00

## 2022-07-12 RX ADMIN — LIDOCAINE HYDROCHLORIDE 3 ML: 10 INJECTION, SOLUTION EPIDURAL; INFILTRATION; INTRACAUDAL; PERINEURAL at 14:00

## 2022-07-12 NOTE — PROGRESS NOTES
Orthopaedic Clinic Note: Knee New Patient    Chief Complaint   Patient presents with   • Left Knee - Pain   • Right Knee - Pain        HPI  Consult from: Venu Bermudez DO    Tripp Lantigua is a 57 y.o. male who presents with bilateral knee pain for 3 week(s). Onset twisting injury. Pain is localized to the medial joint line, lateral joint line, anterior knee and is a 6/10 on the pain scale. Pain is described as dull, aching and stabbing. Associated symptoms include pain, swelling, popping and stiffness. The pain is worse with walking, standing, sitting, climbing stairs, sleeping, working, leisure, lying on affected side and rising from seated position; ice and pain medication and/or NSAID make it better. Previous treatments have included: bracing, NSAIDS and weight loss since symptom onset. Although some transient relief was reported with these interventions, these conservative measures have failed and symptoms have persisted. The patient is limited in daily activities and has had a significant decrease in quality of life as a result. He denies fevers, chills, or constitutional symptoms.    I have reviewed the following portions of the patient's history:History of Present Illness    Past Medical History:   Diagnosis Date   • Allergic 1996   • Arthritis    • Chronic bronchitis (HCC)    • Colitis    • Colon polyp    • Diverticulitis    • Diverticulosis    • GERD (gastroesophageal reflux disease)    • Hypertension    • IBS (irritable bowel syndrome)    • Kidney stone       Past Surgical History:   Procedure Laterality Date   • COLON RESECTION N/A 8/15/2018    Procedure: OPEN LOW ANTERIOR RESECTION WITH TAKEDOWN OF COLOVESICAL FISTULA, MOBILIZATION OF SPLENIC FLEXIURE, APPENDECTOMY, AND ILEOSTOMY;  Surgeon: Daniel Cline MD;  Location: [facility];  Service: General   • COLONOSCOPY     • CYSTOSCOPY Bilateral 8/15/2018    Procedure: CYSTOSCOPY WITH BILATERAL URETERAL STENT PLACEMENT;  Surgeon: Daniel Cline MD;   [facility];  Service: General   • JOINT REPLACEMENT Bilateral     hip      Family History   Problem Relation Age of Onset   • Diverticulosis Mother    • Cancer Father    • Diverticulosis Father    • Cancer Maternal Grandfather      Social History     Socioeconomic History   • Marital status:    Tobacco Use   • Smoking status: Never Smoker   • Smokeless tobacco: Never Used   Vaping Use   • Vaping Use: Never used   Substance and Sexual Activity   • Alcohol use: Yes     Alcohol/week: 16.0 standard drinks     Types: 16 Shots of liquor per week     Comment: Average 16 drinks a week   • Drug use: No   • Sexual activity: Defer      Current Outpatient Medications on File Prior to Visit   Medication Sig Dispense Refill   • ANDROGEL PUMP 20.25 MG/ACT (1.62%) gel      • aspirin 81 MG chewable tablet Chew 81 mg Daily.     • B Complex Vitamins (VITAMIN B COMPLEX PO) Take  by mouth.     • BREO ELLIPTA 200-25 MCG/INH inhaler      • diphenhydrAMINE (BENADRYL) 25 MG tablet Take 25 mg by mouth Every 6 (Six) Hours As Needed for Itching.     • fluticasone (FLONASE) 50 MCG/ACT nasal spray      • fluticasone (FLONASE) 50 MCG/ACT nasal spray 1 spray into the nostril(s) as directed by provider.     • Glucosamine-Chondroit-Vit C-Mn (GLUCOSAMINE 1500 COMPLEX) capsule Take 1 capsule by mouth Daily.     • HYDROcodone-ibuprofen (VICOPROFEN) 7.5-200 MG per tablet      • Krill Oil 1000 MG capsule Take  by mouth.     • L-GLUTAMINE PO Take  by mouth.     • lisinopril (PRINIVIL,ZESTRIL) 10 MG tablet Take 1 tablet by mouth Daily. 90 tablet 3   • Melatonin 10 MG tablet Take  by mouth As Needed.     • Milk Thistle 1000 MG capsule Take  by mouth.     • Multiple Vitamins-Minerals (MULTIVITAMIN WITH MINERALS) tablet tablet Take 1 tablet by mouth Daily.     • Ozempic, 0.25 or 0.5 MG/DOSE, 2 MG/1.5ML solution pen-injector Inject 0.5 mg under the skin into the appropriate area as directed 1 (One) Time Per Week. 1 pen 2   • tadalafil (CIALIS)  "5 MG tablet Take 5 mg by mouth Daily As Needed for erectile dysfunction.     • vitamin C (ASCORBIC ACID) 500 MG tablet Take 500 mg by mouth Daily.     • benzonatate (Tessalon Perles) 100 MG capsule Take 2 capsules by mouth 3 (Three) Times a Day As Needed for Cough. 45 capsule 0     No current facility-administered medications on file prior to visit.      Allergies   Allergen Reactions   • Metformin GI Intolerance   • Acetaminophen Other (See Comments)     GI upset  GI upset          Review of Systems   Constitutional: Negative.    HENT: Negative.    Eyes: Positive for redness.   Respiratory: Negative.    Cardiovascular: Negative.    Gastrointestinal: Negative.    Endocrine: Negative.    Genitourinary: Negative.    Musculoskeletal: Positive for arthralgias.   Skin: Negative.    Allergic/Immunologic: Positive for environmental allergies.   Neurological: Negative.    Hematological: Negative.    Psychiatric/Behavioral: Negative.         The patient's Review of Systems was personally reviewed and confirmed as accurate.    The following portions of the patient's history were reviewed and updated as appropriate: allergies, current medications, past family history, past medical history, past social history, past surgical history and problem list.    Physical Exam  Blood pressure 146/78, height 182.9 cm (72.01\"), weight 119 kg (262 lb 9.6 oz).    Body mass index is 35.61 kg/m².    GENERAL APPEARANCE: awake, alert & oriented x 3, in no acute distress and well developed, well nourished  PSYCH: normal affect  LUNGS:  breathing nonlabored  EYES: sclera anicteric  CARDIOVASCULAR: palpable dorsalis pedis, palpable posterior tibial bilaterally. Capillary refill less than 2 seconds  EXTREMITIES: no clubbing, cyanosis  GAIT:  Antalgic            Right Lower Extremity Exam:   ----------  Hip Exam  ----------  FLEXION CONTRACTURE: None  FLEXION: 110 degrees  INTERNAL ROTATION: 20 degrees at 90 degrees of flexion   EXTERNAL ROTATION: 40 " degrees at 90 degrees of flexion    PAIN WITH HIP MOTION: no  ----------  Knee Exam  ----------  ALIGNMENT: moderate varus, correctible to neutral    RANGE OF MOTION:  Decreased (3 - 120 degrees) with no extensor lag  LIGAMENTOUS STABILITY:   stable to varus and valgus stress at terminal extension and 30 degrees; retensioning of the MCL is appreciated with valgus stress at 30 degrees consistent with medial compartment degeneration     STRENGTH:  5/5 knee flexion, extension. 5/5 ankle dorsiflexion and plantarflexion.     PAIN WITH PALPATION: denies tenderness to palpation about the knee  KNEE EFFUSION: yes, trace effusion  PAIN WITH KNEE ROM: no  PATELLAR CREPITUS: yes, asymptomatic  SPECIAL EXAM FINDINGS:  none    REFLEXES:  PATELLAR 2+/4  ACHILLES 2+/4    CLONUS: no  STRAIGHT LEG TEST:   negative    SENSATION TO LIGHT TOUCH:  DEEP PERONEAL/SUPERFICIAL PERONEAL/SURAL/SAPHENOUS/TIBIAL:   intact    EDEMA:  no  ERYTHEMA:  no  WOUNDS/INCISIONS:  no        Left Lower Extremity Exam:   ----------  Hip Exam  ----------  FLEXION CONTRACTURE: None  FLEXION: 110 degrees  INTERNAL ROTATION: 20 degrees at 90 degrees of flexion   EXTERNAL ROTATION: 40 degrees at 90 degrees of flexion    PAIN WITH HIP MOTION: no  ----------  Knee Exam  ----------  ALIGNMENT: moderate varus, correctible to neutral    RANGE OF MOTION:  Decreased (3 - 120 degrees) with no extensor lag  LIGAMENTOUS STABILITY:   stable to varus and valgus stress at terminal extension and 30 degrees; retensioning of the MCL is appreciated with valgus stress at 30 degrees consistent with medial compartment degeneration     STRENGTH:  5/5 knee flexion, extension. 5/5 ankle dorsiflexion and plantarflexion.     PAIN WITH PALPATION: Medial joint line  KNEE EFFUSION: yes, mild effusion  PAIN WITH KNEE ROM: Yes   PATELLAR CREPITUS: yes, painful and symptomatic  SPECIAL EXAM FINDINGS:  none    REFLEXES:  PATELLAR 2+/4  ACHILLES 2+/4    CLONUS: no  STRAIGHT LEG TEST:    negative    SENSATION TO LIGHT TOUCH:  DEEP PERONEAL/SUPERFICIAL PERONEAL/SURAL/SAPHENOUS/TIBIAL:   intact    EDEMA:  no  ERYTHEMA:  no  WOUNDS/INCISIONS:  no      ______________________________________________________________________  ______________________________________________________________________    RADIOGRAPHIC FINDINGS:   Indication: Bilateral knee pain    Comparison: No prior xrays are available for comparison    Bilateral knee(s) 4 views: moderate tricompartmental osteoarthritis with slight varus alignment bilaterally.  Small periarticular space visualized in all compartments.  No acute bony injury or fracture.      Assessment/Plan:   Diagnosis Plan   1. Primary osteoarthritis of left knee     2. Pain in both knees, unspecified chronicity  XR Knee 4+ View Bilateral   3. Primary osteoarthritis of right knee       Patient suffering an arthritic flareup of the bilateral knees is been refractory to anti-inflammatory treatment.  Discussed treatment options with him.  He is agreeable to cortisone injection in the left knee as it is the most symptomatic.  He will follow-up in 6 weeks for repeat assessment.  If his symptoms fail to adequately improve, MRI may be needed to evaluate for meniscus tear.    Procedure Note:  I discussed with the patient the potential benefits of performing a therapeutic injection of the left knee as well as potential risks including but not limited to infection, swelling, pain, bleeding, bruising, nerve/vessel damage, skin color changes, transient elevation in blood glucose levels, and fat atrophy. After informed consent and verifying correct patient, procedure site, and type of procedure, the area was prepped with alcohol, ethyl chloride was used to numb the skin. Via the superior lateral approach, 3cc of 1% lidocaine, 1 cc of 0.75% Marcaine and 2 cc of 40mg/ml of Kenalog were injected into the left knee. The patient tolerated the procedure well. There were no complications. A  sterile dressing was placed over the injection site.      Doc Butler MD  07/12/22  14:24 EDT

## 2022-07-12 NOTE — PROGRESS NOTES
Procedure   Large Joint Arthrocentesis: L knee  Date/Time: 7/12/2022 2:00 PM  Consent given by: patient  Site marked: site marked  Timeout: Immediately prior to procedure a time out was called to verify the correct patient, procedure, equipment, support staff and site/side marked as required   Supporting Documentation  Indications: pain   Procedure Details  Location: knee - L knee  Preparation: Patient was prepped and draped in the usual sterile fashion  Needle size: 22 G  Approach: anterolateral  Medications administered: 80 mg triamcinolone acetonide 40 MG/ML; 3 mL lidocaine PF 1% 1 % (1 cc marcaine .75% ndc#[id number] lot#[id number] exp:[id number])  Patient tolerance: patient tolerated the procedure well with no immediate complications

## 2022-07-19 ENCOUNTER — OFFICE VISIT (OUTPATIENT)
Dept: FAMILY MEDICINE CLINIC | Facility: CLINIC | Age: 57
End: 2022-07-19

## 2022-07-19 VITALS
HEART RATE: 94 BPM | HEIGHT: 72 IN | DIASTOLIC BLOOD PRESSURE: 80 MMHG | SYSTOLIC BLOOD PRESSURE: 130 MMHG | BODY MASS INDEX: 34.38 KG/M2 | WEIGHT: 253.8 LBS | OXYGEN SATURATION: 95 %

## 2022-07-19 DIAGNOSIS — I25.10 CORONARY ARTERY DISEASE INVOLVING NATIVE HEART WITHOUT ANGINA PECTORIS, UNSPECIFIED VESSEL OR LESION TYPE: ICD-10-CM

## 2022-07-19 DIAGNOSIS — Z00.00 WELL ADULT EXAM: Primary | ICD-10-CM

## 2022-07-19 DIAGNOSIS — E78.5 DYSLIPIDEMIA, GOAL LDL BELOW 70: ICD-10-CM

## 2022-07-19 DIAGNOSIS — Z13.0 SCREENING FOR DEFICIENCY ANEMIA: ICD-10-CM

## 2022-07-19 DIAGNOSIS — E55.9 VITAMIN D DEFICIENCY: ICD-10-CM

## 2022-07-19 DIAGNOSIS — E11.9 TYPE 2 DIABETES MELLITUS WITHOUT COMPLICATION, WITHOUT LONG-TERM CURRENT USE OF INSULIN: ICD-10-CM

## 2022-07-19 DIAGNOSIS — Z13.29 SCREENING FOR ENDOCRINE DISORDER: ICD-10-CM

## 2022-07-19 DIAGNOSIS — I10 PRIMARY HYPERTENSION: Chronic | ICD-10-CM

## 2022-07-19 LAB
EXPIRATION DATE: NORMAL
HBA1C MFR BLD: 6.8 %
Lab: NORMAL

## 2022-07-19 PROCEDURE — 99396 PREV VISIT EST AGE 40-64: CPT | Performed by: FAMILY MEDICINE

## 2022-07-19 RX ORDER — ROSUVASTATIN CALCIUM 10 MG/1
10 TABLET, COATED ORAL DAILY
Qty: 90 TABLET | Refills: 3 | Status: SHIPPED | OUTPATIENT
Start: 2022-07-19 | End: 2023-01-13 | Stop reason: SDDI

## 2022-07-19 RX ORDER — SEMAGLUTIDE 1.34 MG/ML
0.5 INJECTION, SOLUTION SUBCUTANEOUS WEEKLY
Qty: 3 PEN | Refills: 2 | Status: SHIPPED | OUTPATIENT
Start: 2022-07-19 | End: 2023-01-13 | Stop reason: SDUPTHER

## 2022-07-19 RX ORDER — VITAMIN B COMPLEX
100 TABLET ORAL DAILY
Qty: 90 EACH | Refills: 3 | Status: SHIPPED | OUTPATIENT
Start: 2022-07-19

## 2022-07-20 ENCOUNTER — LAB (OUTPATIENT)
Dept: LAB | Facility: HOSPITAL | Age: 57
End: 2022-07-20

## 2022-07-20 DIAGNOSIS — E55.9 VITAMIN D DEFICIENCY: ICD-10-CM

## 2022-07-20 DIAGNOSIS — I10 PRIMARY HYPERTENSION: Chronic | ICD-10-CM

## 2022-07-20 DIAGNOSIS — E34.9 TESTOSTERONE DEFICIENCY: ICD-10-CM

## 2022-07-20 DIAGNOSIS — Z13.29 SCREENING FOR ENDOCRINE DISORDER: ICD-10-CM

## 2022-07-20 DIAGNOSIS — Z13.0 SCREENING FOR DEFICIENCY ANEMIA: ICD-10-CM

## 2022-07-20 DIAGNOSIS — E78.5 DYSLIPIDEMIA, GOAL LDL BELOW 70: ICD-10-CM

## 2022-07-20 LAB
25(OH)D3 SERPL-MCNC: 32.8 NG/ML (ref 30–100)
ALBUMIN SERPL-MCNC: 4.2 G/DL (ref 3.5–5.2)
ALBUMIN/GLOB SERPL: 1.8 G/DL
ALP SERPL-CCNC: 82 U/L (ref 39–117)
ALT SERPL W P-5'-P-CCNC: 25 U/L (ref 1–41)
ANION GAP SERPL CALCULATED.3IONS-SCNC: 12 MMOL/L (ref 5–15)
AST SERPL-CCNC: 17 U/L (ref 1–40)
BILIRUB SERPL-MCNC: 0.4 MG/DL (ref 0–1.2)
BILIRUB UR QL STRIP: NEGATIVE
BUN SERPL-MCNC: 16 MG/DL (ref 6–20)
BUN/CREAT SERPL: 20 (ref 7–25)
CALCIUM SPEC-SCNC: 8.7 MG/DL (ref 8.6–10.5)
CHLORIDE SERPL-SCNC: 103 MMOL/L (ref 98–107)
CHOLEST SERPL-MCNC: 170 MG/DL (ref 0–200)
CLARITY UR: CLEAR
CO2 SERPL-SCNC: 22 MMOL/L (ref 22–29)
COLOR UR: YELLOW
CREAT SERPL-MCNC: 0.8 MG/DL (ref 0.76–1.27)
DEPRECATED RDW RBC AUTO: 40.2 FL (ref 37–54)
EGFRCR SERPLBLD CKD-EPI 2021: 101.9 ML/MIN/1.73
ERYTHROCYTE [DISTWIDTH] IN BLOOD BY AUTOMATED COUNT: 12.9 % (ref 12.3–15.4)
GLOBULIN UR ELPH-MCNC: 2.3 GM/DL
GLUCOSE SERPL-MCNC: 155 MG/DL (ref 65–99)
GLUCOSE UR STRIP-MCNC: NEGATIVE MG/DL
HCT VFR BLD AUTO: 44.9 % (ref 37.5–51)
HDLC SERPL-MCNC: 40 MG/DL (ref 40–60)
HGB BLD-MCNC: 15.1 G/DL (ref 13–17.7)
HGB UR QL STRIP.AUTO: NEGATIVE
KETONES UR QL STRIP: NEGATIVE
LDLC SERPL CALC-MCNC: 90 MG/DL (ref 0–100)
LDLC/HDLC SERPL: 2.06 {RATIO}
LEUKOCYTE ESTERASE UR QL STRIP.AUTO: NEGATIVE
MCH RBC QN AUTO: 29.4 PG (ref 26.6–33)
MCHC RBC AUTO-ENTMCNC: 33.6 G/DL (ref 31.5–35.7)
MCV RBC AUTO: 87.4 FL (ref 79–97)
NITRITE UR QL STRIP: NEGATIVE
PH UR STRIP.AUTO: 6 [PH] (ref 5–8)
PLATELET # BLD AUTO: 162 10*3/MM3 (ref 140–450)
PMV BLD AUTO: 12.2 FL (ref 6–12)
POTASSIUM SERPL-SCNC: 4.4 MMOL/L (ref 3.5–5.2)
PROT SERPL-MCNC: 6.5 G/DL (ref 6–8.5)
PROT UR QL STRIP: NEGATIVE
RBC # BLD AUTO: 5.14 10*6/MM3 (ref 4.14–5.8)
SODIUM SERPL-SCNC: 137 MMOL/L (ref 136–145)
SP GR UR STRIP: 1.02 (ref 1–1.03)
TESTOST SERPL-MCNC: 168 NG/DL (ref 193–740)
TRIGL SERPL-MCNC: 238 MG/DL (ref 0–150)
TSH SERPL DL<=0.05 MIU/L-ACNC: 0.9 UIU/ML (ref 0.27–4.2)
UROBILINOGEN UR QL STRIP: NORMAL
VLDLC SERPL-MCNC: 40 MG/DL (ref 5–40)
WBC NRBC COR # BLD: 8.36 10*3/MM3 (ref 3.4–10.8)

## 2022-07-20 PROCEDURE — 82306 VITAMIN D 25 HYDROXY: CPT

## 2022-07-20 PROCEDURE — 84403 ASSAY OF TOTAL TESTOSTERONE: CPT

## 2022-07-20 PROCEDURE — 80061 LIPID PANEL: CPT

## 2022-07-20 PROCEDURE — 81003 URINALYSIS AUTO W/O SCOPE: CPT

## 2022-07-20 PROCEDURE — 80050 GENERAL HEALTH PANEL: CPT

## 2022-07-26 ENCOUNTER — TELEPHONE (OUTPATIENT)
Dept: ORTHOPEDIC SURGERY | Facility: CLINIC | Age: 57
End: 2022-07-26

## 2022-07-26 DIAGNOSIS — M17.12 PRIMARY OSTEOARTHRITIS OF LEFT KNEE: Primary | ICD-10-CM

## 2022-07-26 DIAGNOSIS — M23.92 INTERNAL DERANGEMENT OF LEFT KNEE: ICD-10-CM

## 2022-07-26 NOTE — TELEPHONE ENCOUNTER
----- Message from PIPPA Solorio(SURENDRA) sent at 7/26/2022  8:46 AM EDT -----  Regarding: FW: Knee  Disha,    Will we order an MRI before his visit?    Thanks,  Anurag  ----- Message -----  From: Yael Granados  Sent: 7/25/2022   4:03 PM EDT  To: PIPPA Solorio(SURENDRA)  Subject: FW: Knee                                           ----- Message -----  From: Tripp Lantigua  Sent: 7/25/2022   3:45 PM EDT  To: [facility] Clinical Pool  Subject: Knee                                             Unfortunately, my knee is starting to swell again and I think I’m gonna need meniscus surgery. I have to go up and down a lot of steps where I work. However I am off work until the middle of August. Can we expedite the MRI and see what needs to be done ASAP. Thanks

## 2022-07-27 ENCOUNTER — HOSPITAL ENCOUNTER (OUTPATIENT)
Dept: MRI IMAGING | Facility: HOSPITAL | Age: 57
Discharge: HOME | End: 2022-07-27
Admitting: PHYSICIAN ASSISTANT

## 2022-07-27 DIAGNOSIS — M23.92 INTERNAL DERANGEMENT OF LEFT KNEE: ICD-10-CM

## 2022-07-27 DIAGNOSIS — M17.12 PRIMARY OSTEOARTHRITIS OF LEFT KNEE: ICD-10-CM

## 2022-07-27 PROCEDURE — 73721 MRI JNT OF LWR EXTRE W/O DYE: CPT

## 2022-07-29 ENCOUNTER — TELEPHONE (OUTPATIENT)
Dept: ORTHOPEDIC SURGERY | Facility: CLINIC | Age: 57
End: 2022-07-29

## 2022-07-29 NOTE — TELEPHONE ENCOUNTER
Dr. Butler please advise after seeing messages below.    Ines Lara      ----- Message -----  From: Tripp AGOSTO Grzegorz  Sent: 7/29/2022   3:11 PM EDT  To: [facility] Clinical Pool  Subject: Question regarding MRI KNEE LEFT                 Or, this: injectable hyaluronic acid-based hydrogel? Thanks, Tripp      Would you like to try a prescription strength anti-inflammatory such as Mobic?        Ines Chacon, that’s to much ibuprofen--kidney damage is a possibility. I need to look for another strategy. Lmk if you have other options.               JR      Per Dr. Butler:     He would like you to try Ibuprofen 800 mg three times a day until we see you at your next appointment.  At that time we will re access and go from there.           Ines

## 2022-07-29 NOTE — TELEPHONE ENCOUNTER
Please interpret the MRI for this patient.        Ines Lara      ----- Message from Tripp Lantigua sent at 7/28/2022  9:20 PM EDT -----  Regarding: Question regarding MRI KNEE LEFT  I do not speak this language, so please translate these findings into language that is more understandable. What needs to done to get the swelling to cease and pain to subside? I know there is something wrong inside my knee. It was caused by a specific incident and I’d like to get back to normal. What are my options? Thanks, Tripp

## 2022-07-29 NOTE — TELEPHONE ENCOUNTER
From Dr. Butler:    The constellation of findings on the MRI can be distill down to 1 word.  Arthritis.  The reference to chondral loss and subchondral edema indicates that the cartilage is thinned or absent and the subsequent bone underneath is experiencing increased stress resulting in swelling in the bone itself.  There are degenerative changes of the meniscus as expected with these arthritic changes as well as a large amount of fluid in the knee.  No discrete meniscal tear is identified.  Arthritis is worse in the inside and front of the knee.       Sent message via [employer] to patient.    Ines Lara

## 2022-08-30 ENCOUNTER — OFFICE VISIT (OUTPATIENT)
Dept: CARDIOLOGY | Facility: CLINIC | Age: 57
End: 2022-08-30

## 2022-08-30 ENCOUNTER — TELEPHONE (OUTPATIENT)
Dept: CARDIOLOGY | Facility: CLINIC | Age: 57
End: 2022-08-30

## 2022-08-30 VITALS
BODY MASS INDEX: 34.78 KG/M2 | SYSTOLIC BLOOD PRESSURE: 124 MMHG | OXYGEN SATURATION: 95 % | HEART RATE: 76 BPM | DIASTOLIC BLOOD PRESSURE: 86 MMHG | WEIGHT: 256.8 LBS | HEIGHT: 72 IN

## 2022-08-30 DIAGNOSIS — I10 PRIMARY HYPERTENSION: Primary | Chronic | ICD-10-CM

## 2022-08-30 DIAGNOSIS — I25.10 CORONARY ARTERY DISEASE INVOLVING NATIVE CORONARY ARTERY OF NATIVE HEART WITHOUT ANGINA PECTORIS: ICD-10-CM

## 2022-08-30 PROCEDURE — 93000 ELECTROCARDIOGRAM COMPLETE: CPT | Performed by: PHYSICIAN ASSISTANT

## 2022-08-30 PROCEDURE — 99203 OFFICE O/P NEW LOW 30 MIN: CPT | Performed by: PHYSICIAN ASSISTANT

## 2022-08-30 RX ORDER — IBUPROFEN 200 MG
200 TABLET ORAL EVERY 6 HOURS PRN
COMMUNITY

## 2022-08-30 NOTE — PROGRESS NOTES
"[facility] at [facility]  INITIAL OFFICE CONSULT      Tripp Lantigua  1965  PCP: Venu Bermudez DO    SUBJECTIVE:   Tripp Lantigua is a 57 y.o. 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  The patient states he used to follow with  in the past has had stress test and had a cardiology work-up several years ago.  Reports recently he had a calcium score test for screening as he is concerned about some of his vague symptoms short of breath with exertion as well his risk factors cardiovascular disease as he has a strong family history.  He also has a history of diabetes, hypertension dyslipidemia and is concerned about his risk for heart attack in his lifetime.  He had a calcium score test that suggested moderate to severe plaque in the RCA.  In view of this finding is referred to our office for evaluation.  Patient states he does try to exercise he has some vague symptoms of fatigue shortness of breath when he does this.  He has not had any sudden syncope or heart failure type symptoms.  Recently has had some orthostatic type symptoms when goes from sitting to standing position becomes lightheaded he has to sit down for a minute and then can move on doing when he is doing.  He recently started on Crestor therapy in view of the findings of the calcium score test.  He is tolerating this medication well.  He is also try to work hard on his diet plant-based, Mediterranean to keep his diabetes and check in his cholesterol checked.  He wishes to pursue further cardiac evaluation regarding his \"risk\" with coronary artery disease.      Cardiac PMH: (Old records have been reviewed and summarized " below)  1. CAD  a. Remote Negative stress test with [facility]  b. Calcium score test +  Moderate plaque, Negative calcium in LAD, CX and Left Main. 6/14/2022  2. DM Type II, Last HbA1c 5.9  3. HLD: Currently on Crestor. LDL 90, Trig 238 7/2022  4. HTN: Controlled.   5. Obesity, BMI is 34.8  6. Testosterone deficiency-On Androgel , Last T 168  7.  ED   8. Remote: Fistula repair 2018      Past Medical History, Past Surgical History, Family history, Social History, and Medications were all reviewed with the patient today and updated as necessary.     Current Outpatient Medications   Medication Sig Dispense Refill   • ANDROGEL PUMP 20.25 MG/ACT (1.62%) gel      • aspirin 81 MG chewable tablet Chew 81 mg Daily.     • B Complex Vitamins (VITAMIN B COMPLEX PO) Take  by mouth.     • BREO ELLIPTA 200-25 MCG/INH inhaler As Needed.     • Coenzyme Q10 (CoQ10) 100 MG capsule Take 100 mg by mouth Daily. Take with statin. 90 each 3   • diphenhydrAMINE (BENADRYL) 25 MG tablet Take 25 mg by mouth Every 6 (Six) Hours As Needed for Itching.     • fluticasone (FLONASE) 50 MCG/ACT nasal spray Daily.     • Glucosamine-Chondroit-Vit C-Mn (GLUCOSAMINE 1500 COMPLEX) capsule Take 1 capsule by mouth Daily.     • HYDROcodone-ibuprofen (VICOPROFEN) 7.5-200 MG per tablet Every 4 (Four) Hours As Needed.     • ibuprofen (ADVIL,MOTRIN) 200 MG tablet Take 200 mg by mouth Every 6 (Six) Hours As Needed for Mild Pain.     • Krill Oil 1000 MG capsule Take  by mouth.     • L-GLUTAMINE PO Take  by mouth.     • lisinopril (PRINIVIL,ZESTRIL) 10 MG tablet Take 1 tablet by mouth Daily. 90 tablet 3   • Melatonin 10 MG tablet Take  by mouth As Needed.     • Milk Thistle 1000 MG capsule Take  by mouth.     • Multiple Vitamins-Minerals (MULTIVITAMIN WITH MINERALS) tablet tablet Take 1 tablet by mouth Daily.     • Ozempic, 0.25 or 0.5 MG/DOSE, 2 MG/1.5ML solution pen-injector Inject 0.5 mg under the skin into the appropriate area as directed 1 (One) Time Per Week.  3 pen 2   • rosuvastatin (Crestor) 10 MG tablet Take 1 tablet by mouth Daily. 90 tablet 3   • tadalafil (CIALIS) 5 MG tablet Take 5 mg by mouth Daily As Needed for erectile dysfunction.     • vitamin C (ASCORBIC ACID) 500 MG tablet Take 500 mg by mouth Daily.     • fluticasone (FLONASE) 50 MCG/ACT nasal spray 1 spray into the nostril(s) as directed by provider.       No current facility-administered medications for this visit.     Allergies   Allergen Reactions   • Metformin GI Intolerance   • Acetaminophen Other (See Comments)     GI upset  GI upset           Past Medical History:   Diagnosis Date   • Allergic 1996   • Arthritis    • Chronic bronchitis (HCC)    • Colitis    • Colon polyp    • Diverticulitis    • Diverticulosis    • GERD (gastroesophageal reflux disease)    • Hypertension    • IBS (irritable bowel syndrome)    • Kidney stone      Past Surgical History:   Procedure Laterality Date   • COLON RESECTION N/A 8/15/2018    Procedure: OPEN LOW ANTERIOR RESECTION WITH TAKEDOWN OF COLOVESICAL FISTULA, MOBILIZATION OF SPLENIC FLEXIURE, APPENDECTOMY, AND ILEOSTOMY;  Surgeon: Daniel Cline MD;  Location:  [facility];  Service: General   • COLONOSCOPY     • CYSTOSCOPY Bilateral 8/15/2018    Procedure: CYSTOSCOPY WITH BILATERAL URETERAL STENT PLACEMENT;  Surgeon: Daniel Cline MD;  Location:  [facility];  Service: General   • JOINT REPLACEMENT Bilateral     hip     Family History   Problem Relation Age of Onset   • Diverticulosis Mother    • Cancer Father    • Diverticulosis Father    • Cancer Maternal Grandfather      Social History     Tobacco Use   • Smoking status: Never Smoker   • Smokeless tobacco: Never Used   Substance Use Topics   • Alcohol use: Yes     Alcohol/week: 16.0 standard drinks     Types: 16 Shots of liquor per week     Comment: Average 16 drinks a week       ROS:  Review of Symptoms:  General: no recent weight loss/gain, +  weakness or fatigue  Skin: no rashes, lumps, or other skin  "changes  HEENT: + dizziness, lightheadedness,with standing no   vision changes  Respiratory: no cough or hemoptysis  Cardiovascular: no palpitations, and tachycardia  Gastrointestinal: no black/tarry stools or diarrhea  Urinary: no change in frequency or urgency  Peripheral Vascular: no claudication or leg cramps  Musculoskeletal: no muscle or joint pain/stiffness  Psychiatric: no depression or excessive stress  Neurological: no sensory or motor loss, no syncope  Hematologic: no anemia, easy bruising or bleeding  Endocrine: no thyroid problems, nor heat or cold intolerance         PHYSICAL EXAM:   /86 (BP Location: Right arm, Patient Position: Sitting)   Pulse 76   Ht 182.9 cm (72\")   Wt 116 kg (256 lb 12.8 oz)   SpO2 95%   BMI 34.83 kg/m²      Wt Readings from Last 5 Encounters:   08/30/22 116 kg (256 lb 12.8 oz)   07/19/22 115 kg (253 lb 12.8 oz)   07/12/22 119 kg (262 lb 9.6 oz)   05/13/22 120 kg (264 lb 3.2 oz)   07/30/20 118 kg (261 lb)     BP Readings from Last 5 Encounters:   08/30/22 124/86   07/19/22 130/80   07/12/22 146/78   05/13/22 124/76   07/30/20 124/84       General-Well Nourished, Well developed  Eyes - PERRLA  Neck- supple, No mass  CV- regular rate and rhythm, no MRG  Lung- clear bilaterally  Abd- soft, +BS  Musc/skel - Norm strength and range of motion  Skin- warm and dry  Neuro - Alert & Oriented x 3, appropriate mood.    Patient's external notes were reviewed.  Independent interpretation of test performed by another physician in facility were reviewed.  Outside laboratory data was also reviewed.    Medical problems and test results were reviewed with the patient today.     Results for orders placed or performed in visit on 07/20/22   Testosterone    Specimen: Blood   Result Value Ref Range    Testosterone, Total 168.00 (L) 193.00 - 740.00 ng/dL   CBC (No Diff)    Specimen: Blood   Result Value Ref Range    WBC 8.36 3.40 - 10.80 10*3/mm3    RBC 5.14 4.14 - 5.80 10*6/mm3    Hemoglobin " 15.1 13.0 - 17.7 g/dL    Hematocrit 44.9 37.5 - 51.0 %    MCV 87.4 79.0 - 97.0 fL    MCH 29.4 26.6 - 33.0 pg    MCHC 33.6 31.5 - 35.7 g/dL    RDW 12.9 12.3 - 15.4 %    RDW-SD 40.2 37.0 - 54.0 fl    MPV 12.2 (H) 6.0 - 12.0 fL    Platelets 162 140 - 450 10*3/mm3   Lipid Panel    Specimen: Blood   Result Value Ref Range    Total Cholesterol 170 0 - 200 mg/dL    Triglycerides 238 (H) 0 - 150 mg/dL    HDL Cholesterol 40 40 - 60 mg/dL    LDL Cholesterol  90 0 - 100 mg/dL    VLDL Cholesterol 40 5 - 40 mg/dL    LDL/HDL Ratio 2.06    Comprehensive Metabolic Panel    Specimen: Blood   Result Value Ref Range    Glucose 155 (H) 65 - 99 mg/dL    BUN 16 6 - 20 mg/dL    Creatinine 0.80 0.76 - 1.27 mg/dL    Sodium 137 136 - 145 mmol/L    Potassium 4.4 3.5 - 5.2 mmol/L    Chloride 103 98 - 107 mmol/L    CO2 22.0 22.0 - 29.0 mmol/L    Calcium 8.7 8.6 - 10.5 mg/dL    Total Protein 6.5 6.0 - 8.5 g/dL    Albumin 4.20 3.50 - 5.20 g/dL    ALT (SGPT) 25 1 - 41 U/L    AST (SGOT) 17 1 - 40 U/L    Alkaline Phosphatase 82 39 - 117 U/L    Total Bilirubin 0.4 0.0 - 1.2 mg/dL    Globulin 2.3 gm/dL    A/G Ratio 1.8 g/dL    BUN/Creatinine Ratio 20.0 7.0 - 25.0    Anion Gap 12.0 5.0 - 15.0 mmol/L    eGFR 101.9 >60.0 mL/min/1.73   Urinalysis With Microscopic If Indicated (No Culture) - Urine, Clean Catch    Specimen: Urine, Clean Catch   Result Value Ref Range    Color, UA Yellow Yellow, Straw    Appearance, UA Clear Clear    pH, UA 6.0 5.0 - 8.0    Specific Gravity, UA 1.019 1.005 - 1.030    Glucose, UA Negative Negative    Ketones, UA Negative Negative    Bilirubin, UA Negative Negative    Blood, UA Negative Negative    Protein, UA Negative Negative    Leuk Esterase, UA Negative Negative    Nitrite, UA Negative Negative    Urobilinogen, UA 0.2 E.U./dL 0.2 - 1.0 E.U./dL   TSH    Specimen: Blood   Result Value Ref Range    TSH 0.897 0.270 - 4.200 uIU/mL   Vitamin D 25 Hydroxy    Specimen: Blood   Result Value Ref Range    25 Hydroxy, Vitamin D 32.8  30.0 - 100.0 ng/ml         Lab Results   Component Value Date    CHOL 170 07/20/2022    HDL 40 07/20/2022    LDL 90 07/20/2022    VLDL 40 07/20/2022       EKG:  (EKG/Tracing has been independently visualized by me and summarized below)      ECG 12 Lead    Date/Time: 8/30/2022 12:21 PM  Performed by: Wallace Adhikari PA  Authorized by: Wallace Adhikari PA   Rhythm: sinus rhythm  Rate: normal  Conduction: conduction normal  ST Segments: ST segments normal  T Waves: T waves normal  QRS axis: normal    Clinical impression: normal ECG            ASSESSMENT   1. CAD: Remote Negative stress test with [facility](DBI). Recent abnormal CT calcium score test with + Calcium in RCA. Symptoms of fatiuge, dyspnea with exetion.   2. HLD: Continue Crestor with goal of LDL<70  3. DM Type II, Keep HbA1c less then 6, diet exercise.   4. Mild Obesity  5. Family history of CAD        PLAN  · CAD, will pursue GXT MPS to rule out significant ischemia.   · Continue to focus on risk factors for CAD, HTN control, Crestor therapy, Keep DM in check with diet.   · He has some Orthostatic symptoms, will ask him to increase hydration, change Zestril to 5mg BID see if this helps.   · Return for follow up this spring or sooner if stress test abnormal.            Cardiology/Electrophysiology  08/30/22  09:23 EDT  Will Onofre CAO

## 2022-08-30 NOTE — TELEPHONE ENCOUNTER
----- Message from DAMI Odell sent at 8/30/2022  9:51 AM EDT -----  Previous stress test with [facility] please-5 years ago?

## 2022-09-02 ENCOUNTER — TELEMEDICINE (OUTPATIENT)
Dept: FAMILY MEDICINE CLINIC | Facility: CLINIC | Age: 57
End: 2022-09-02

## 2022-09-02 DIAGNOSIS — J01.90 ACUTE SINUSITIS, RECURRENCE NOT SPECIFIED, UNSPECIFIED LOCATION: Primary | ICD-10-CM

## 2022-09-02 PROCEDURE — 99213 OFFICE O/P EST LOW 20 MIN: CPT | Performed by: NURSE PRACTITIONER

## 2022-09-02 RX ORDER — PREDNISONE 10 MG/1
TABLET ORAL
Qty: 1 EACH | Refills: 0 | Status: SHIPPED | OUTPATIENT
Start: 2022-09-02 | End: 2022-10-21

## 2022-09-02 RX ORDER — BROMPHENIRAMINE MALEATE, PSEUDOEPHEDRINE HYDROCHLORIDE, AND DEXTROMETHORPHAN HYDROBROMIDE 2; 30; 10 MG/5ML; MG/5ML; MG/5ML
10 SYRUP ORAL 4 TIMES DAILY PRN
Qty: 120 ML | Refills: 0 | Status: SHIPPED | OUTPATIENT
Start: 2022-09-02 | End: 2022-09-05

## 2022-09-02 RX ORDER — AMOXICILLIN 875 MG/1
875 TABLET, COATED ORAL 2 TIMES DAILY
Qty: 14 TABLET | Refills: 0 | Status: SHIPPED | OUTPATIENT
Start: 2022-09-02 | End: 2022-09-09

## 2022-09-12 NOTE — PROGRESS NOTES
Chief Complaint  No chief complaint on file.    Subjective         Tripp Lantigua presents to [facility]  Pt is being seen today with concerns of bronchitis. Pt has productive cough with green and yellow sputum, is not sleeping well. He has had symptoms for about 4 days. He has not done a covid test. Pt has not noticed any wheezing. Pt reports history of sinusitis about twice a year.     Objective   Vital Signs:   There were no vitals taken for this visit.    Physical Exam   HENT:   Nose: Congestion present.   Eyes: Conjunctivae are normal.   Pulmonary/Chest: Effort normal.   Musculoskeletal: Normal range of motion.   Neurological: He is alert.   Skin: Skin is warm. There is erythema.   Psychiatric: He has a normal mood and affect.     Result Review :                 Assessment and Plan    Diagnoses and all orders for this visit:    1. Acute sinusitis, recurrence not specified, unspecified location (Primary)    Other orders  -     brompheniramine-pseudoephedrine-DM 30-2-10 MG/5ML syrup; Take 10 mL by mouth 4 (Four) Times a Day As Needed for Congestion or Cough for up to 3 days.  Dispense: 120 mL; Refill: 0  -     predniSONE (DELTASONE) 10 MG (21) dose pack; Use as directed on package  Dispense: 1 each; Refill: 0  -     amoxicillin (AMOXIL) 875 MG tablet; Take 1 tablet by mouth 2 (Two) Times a Day for 7 days.  Dispense: 14 tablet; Refill: 0        Follow Up   No follow-ups on file.  Patient was given instructions and counseling regarding his condition or for health maintenance advice. Please see specific information pulled into the AVS if appropriate.     Mode of Visit: Video  Location of patient: home  Location of provider: [facility]  You have chosen to receive care through a telehealth visit.  The patient has signed the video visit consent form.  The visit included audio and video interaction. No technical issues occurred during this visit.    Continue with BiPAP at night / when sleeping

## 2022-10-21 ENCOUNTER — LAB (OUTPATIENT)
Dept: LAB | Facility: HOSPITAL | Age: 57
End: 2022-10-21

## 2022-10-21 ENCOUNTER — OFFICE VISIT (OUTPATIENT)
Dept: FAMILY MEDICINE CLINIC | Facility: CLINIC | Age: 57
End: 2022-10-21

## 2022-10-21 VITALS
OXYGEN SATURATION: 98 % | HEART RATE: 82 BPM | SYSTOLIC BLOOD PRESSURE: 108 MMHG | WEIGHT: 258 LBS | BODY MASS INDEX: 34.99 KG/M2 | DIASTOLIC BLOOD PRESSURE: 66 MMHG | TEMPERATURE: 97.8 F

## 2022-10-21 DIAGNOSIS — E78.5 DYSLIPIDEMIA, GOAL LDL BELOW 70: ICD-10-CM

## 2022-10-21 DIAGNOSIS — E11.9 TYPE 2 DIABETES MELLITUS WITHOUT COMPLICATION, WITHOUT LONG-TERM CURRENT USE OF INSULIN: ICD-10-CM

## 2022-10-21 DIAGNOSIS — Z12.5 SCREENING PSA (PROSTATE SPECIFIC ANTIGEN): ICD-10-CM

## 2022-10-21 DIAGNOSIS — B37.9 CANDIDIASIS: ICD-10-CM

## 2022-10-21 DIAGNOSIS — Z51.81 THERAPEUTIC DRUG MONITORING: ICD-10-CM

## 2022-10-21 DIAGNOSIS — E11.9 TYPE 2 DIABETES MELLITUS WITHOUT COMPLICATION, WITHOUT LONG-TERM CURRENT USE OF INSULIN: Primary | ICD-10-CM

## 2022-10-21 DIAGNOSIS — E34.9 TESTOSTERONE DEFICIENCY: ICD-10-CM

## 2022-10-21 DIAGNOSIS — I10 PRIMARY HYPERTENSION: Chronic | ICD-10-CM

## 2022-10-21 DIAGNOSIS — H53.9 VISION CHANGES: ICD-10-CM

## 2022-10-21 DIAGNOSIS — I25.10 CORONARY ARTERY DISEASE INVOLVING NATIVE CORONARY ARTERY OF NATIVE HEART WITHOUT ANGINA PECTORIS: ICD-10-CM

## 2022-10-21 LAB
CHOLEST SERPL-MCNC: 112 MG/DL (ref 0–200)
EXPIRATION DATE: NORMAL
HBA1C MFR BLD: 6.3 %
HCT VFR BLD AUTO: 43.7 % (ref 37.5–51)
HDLC SERPL-MCNC: 54 MG/DL (ref 40–60)
HGB BLD-MCNC: 14.8 G/DL (ref 13–17.7)
LDLC SERPL CALC-MCNC: 46 MG/DL (ref 0–100)
LDLC/HDLC SERPL: 0.88 {RATIO}
Lab: NORMAL
PSA SERPL-MCNC: 0.73 NG/ML (ref 0–4)
TESTOST SERPL-MCNC: 319 NG/DL (ref 193–740)
TRIGL SERPL-MCNC: 53 MG/DL (ref 0–150)
VLDLC SERPL-MCNC: 12 MG/DL (ref 5–40)

## 2022-10-21 PROCEDURE — 99214 OFFICE O/P EST MOD 30 MIN: CPT | Performed by: FAMILY MEDICINE

## 2022-10-21 PROCEDURE — 36415 COLL VENOUS BLD VENIPUNCTURE: CPT

## 2022-10-21 PROCEDURE — G0103 PSA SCREENING: HCPCS

## 2022-10-21 PROCEDURE — 80061 LIPID PANEL: CPT

## 2022-10-21 PROCEDURE — 83036 HEMOGLOBIN GLYCOSYLATED A1C: CPT | Performed by: FAMILY MEDICINE

## 2022-10-21 PROCEDURE — 80053 COMPREHEN METABOLIC PANEL: CPT

## 2022-10-21 PROCEDURE — 85018 HEMOGLOBIN: CPT

## 2022-10-21 PROCEDURE — 84403 ASSAY OF TOTAL TESTOSTERONE: CPT

## 2022-10-21 PROCEDURE — 85014 HEMATOCRIT: CPT

## 2022-10-21 PROCEDURE — 82043 UR ALBUMIN QUANTITATIVE: CPT | Performed by: FAMILY MEDICINE

## 2022-10-21 RX ORDER — NYSTATIN 100000 [USP'U]/G
POWDER TOPICAL 2 TIMES DAILY
Qty: 60 G | Refills: 2 | Status: SHIPPED | OUTPATIENT
Start: 2022-10-21 | End: 2022-11-04

## 2022-10-22 LAB
ALBUMIN SERPL-MCNC: 4.1 G/DL (ref 3.5–5.2)
ALBUMIN UR-MCNC: <1.2 MG/DL
ALBUMIN/GLOB SERPL: 1.8 G/DL
ALP SERPL-CCNC: 66 U/L (ref 39–117)
ALT SERPL W P-5'-P-CCNC: 21 U/L (ref 1–41)
ANION GAP SERPL CALCULATED.3IONS-SCNC: 13.9 MMOL/L (ref 5–15)
AST SERPL-CCNC: 18 U/L (ref 1–40)
BILIRUB SERPL-MCNC: 0.5 MG/DL (ref 0–1.2)
BUN SERPL-MCNC: 14 MG/DL (ref 6–20)
BUN/CREAT SERPL: 15.6 (ref 7–25)
CALCIUM SPEC-SCNC: 9.3 MG/DL (ref 8.6–10.5)
CHLORIDE SERPL-SCNC: 104 MMOL/L (ref 98–107)
CO2 SERPL-SCNC: 22.1 MMOL/L (ref 22–29)
CREAT SERPL-MCNC: 0.9 MG/DL (ref 0.76–1.27)
EGFRCR SERPLBLD CKD-EPI 2021: 98.4 ML/MIN/1.73
GLOBULIN UR ELPH-MCNC: 2.3 GM/DL
GLUCOSE SERPL-MCNC: 144 MG/DL (ref 65–99)
POTASSIUM SERPL-SCNC: 4.4 MMOL/L (ref 3.5–5.2)
PROT SERPL-MCNC: 6.4 G/DL (ref 6–8.5)
SODIUM SERPL-SCNC: 140 MMOL/L (ref 136–145)

## 2022-10-30 LAB — DRUGS UR: NORMAL

## 2022-11-06 ENCOUNTER — PATIENT MESSAGE (OUTPATIENT)
Dept: FAMILY MEDICINE CLINIC | Facility: CLINIC | Age: 57
End: 2022-11-06

## 2022-11-16 ENCOUNTER — PATIENT MESSAGE (OUTPATIENT)
Dept: FAMILY MEDICINE CLINIC | Facility: CLINIC | Age: 57
End: 2022-11-16

## 2022-11-21 ENCOUNTER — PATIENT MESSAGE (OUTPATIENT)
Dept: FAMILY MEDICINE CLINIC | Facility: CLINIC | Age: 57
End: 2022-11-21

## 2022-11-21 DIAGNOSIS — E34.9 TESTOSTERONE DEFICIENCY: Primary | ICD-10-CM

## 2022-11-22 RX ORDER — FLUTICASONE PROPIONATE 50 MCG
2 SPRAY, SUSPENSION (ML) NASAL DAILY
Qty: 16 G | Refills: 11 | Status: SHIPPED | OUTPATIENT
Start: 2022-11-22

## 2022-11-22 RX ORDER — TESTOSTERONE 16.2 MG/G
GEL TRANSDERMAL
Qty: 75 G | Refills: 2 | Status: SHIPPED | OUTPATIENT
Start: 2022-11-22

## 2022-11-30 ENCOUNTER — PATIENT MESSAGE (OUTPATIENT)
Dept: FAMILY MEDICINE CLINIC | Facility: CLINIC | Age: 57
End: 2022-11-30

## 2022-12-27 NOTE — TELEPHONE ENCOUNTER
From: Tripp Lantigua  To: Venu Bermudez DO  Sent: 11/30/2022 5:53 PM EST  Subject: CT scan and prescriptions    I want to schedule a CT scan for my upper right abdomen area. I’ve been having bouts of dull pain in that area that I cannot caused by putting pressure on the site. I’ve had it often on for a couple years and it was part of the reason why I had abdominal scans a few years back. I also need a prescription written for Flonase and testosterone: AndroGel. Thanks

## 2022-12-31 DIAGNOSIS — E11.9 TYPE 2 DIABETES MELLITUS WITHOUT COMPLICATION, WITHOUT LONG-TERM CURRENT USE OF INSULIN: ICD-10-CM

## 2022-12-31 DIAGNOSIS — I25.10 CORONARY ARTERY DISEASE INVOLVING NATIVE HEART WITHOUT ANGINA PECTORIS, UNSPECIFIED VESSEL OR LESION TYPE: ICD-10-CM

## 2023-01-03 ENCOUNTER — PATIENT MESSAGE (OUTPATIENT)
Dept: FAMILY MEDICINE CLINIC | Facility: CLINIC | Age: 58
End: 2023-01-03
Payer: COMMERCIAL

## 2023-01-03 ENCOUNTER — PRIOR AUTHORIZATION (OUTPATIENT)
Dept: FAMILY MEDICINE CLINIC | Facility: CLINIC | Age: 58
End: 2023-01-03
Payer: COMMERCIAL

## 2023-01-03 RX ORDER — SEMAGLUTIDE 1.34 MG/ML
0.5 INJECTION, SOLUTION SUBCUTANEOUS WEEKLY
OUTPATIENT
Start: 2023-01-03

## 2023-01-03 RX ORDER — FLUTICASONE PROPIONATE 50 MCG
2 SPRAY, SUSPENSION (ML) NASAL DAILY
Qty: 16 G | Refills: 11 | OUTPATIENT
Start: 2023-01-03

## 2023-01-03 RX ORDER — VITAMIN B COMPLEX
100 TABLET ORAL DAILY
Qty: 90 EACH | Refills: 3 | OUTPATIENT
Start: 2023-01-03

## 2023-01-03 NOTE — TELEPHONE ENCOUNTER
(Key: [id number])  Med:Ozempic  Status:approved   Created:1/3/2023      This request has been approved using information available on the patient's profile. CaseId:[id number];Status:Approved;Review Type:Prior Auth;Coverage Start Date:12/04/2022;Coverage End Date:01/03/2024

## 2023-01-10 ENCOUNTER — PATIENT MESSAGE (OUTPATIENT)
Dept: FAMILY MEDICINE CLINIC | Facility: CLINIC | Age: 58
End: 2023-01-10
Payer: COMMERCIAL

## 2023-01-13 ENCOUNTER — OFFICE VISIT (OUTPATIENT)
Dept: FAMILY MEDICINE CLINIC | Facility: CLINIC | Age: 58
End: 2023-01-13
Payer: COMMERCIAL

## 2023-01-13 VITALS
BODY MASS INDEX: 34.64 KG/M2 | TEMPERATURE: 97.7 F | SYSTOLIC BLOOD PRESSURE: 120 MMHG | HEART RATE: 95 BPM | WEIGHT: 255.4 LBS | DIASTOLIC BLOOD PRESSURE: 78 MMHG | OXYGEN SATURATION: 96 %

## 2023-01-13 DIAGNOSIS — I10 PRIMARY HYPERTENSION: Chronic | ICD-10-CM

## 2023-01-13 DIAGNOSIS — E78.5 DYSLIPIDEMIA, GOAL LDL BELOW 70: Chronic | ICD-10-CM

## 2023-01-13 DIAGNOSIS — E11.9 TYPE 2 DIABETES MELLITUS WITHOUT COMPLICATION, WITHOUT LONG-TERM CURRENT USE OF INSULIN: Primary | Chronic | ICD-10-CM

## 2023-01-13 LAB
EXPIRATION DATE: NORMAL
HBA1C MFR BLD: 6.3 %
Lab: NORMAL

## 2023-01-13 PROCEDURE — 99213 OFFICE O/P EST LOW 20 MIN: CPT | Performed by: FAMILY MEDICINE

## 2023-01-13 RX ORDER — SEMAGLUTIDE 1.34 MG/ML
0.5 INJECTION, SOLUTION SUBCUTANEOUS WEEKLY
Qty: 4.5 ML | Refills: 3 | Status: SHIPPED | OUTPATIENT
Start: 2023-01-13

## 2023-01-13 NOTE — PROGRESS NOTES
Established Patient Office Visit      Patient Name: Tripp Lantigua  : 1965   MRN: [id number]   Care Team: Patient Care Team:  Venu Bermudez DO as PCP - General (Family Medicine)  Wallace Adhikari PA as Physician Assistant (Cardiology)    Chief Complaint:    Chief Complaint   Patient presents with   • Follow-up   • Diabetes       History of Present Illness: Tripp Lantigua is a 57 y.o. male who is here today for chief complaint.    YOLA Almaguer presents today for a 3-month follow-up on diabetes. He is on Ozempic 0.5 mg weekly. In 10/2022, his A1c was improving over the last year from 6.8 to 6.3 percent. His weight has also come down. His cholesterol is very well controlled with an LDL of 46 on rosuvastatin 10 mg.    The patient reports that he has been doing well. He states that his eating habits have been great. He reports lifting weights 5 to 6 times per week. He reports that if he does aerobic exercise, he pays for it too much. He reports that he has artificial hips and a torn meniscus in each knee. He reports that he walks, but he does not consider walking exercise. He reports that he has been listening to a podcast called [employer]. He reports that he can bench press 225 pounds and do 3 sets of 16. He reports that he can squat pretty good, but has torn biceps and he cannot curl as much as he used to. He reports that he is shocked at 6.3 percent. He reports that he is not sure how to handle that. He reports that he has not eaten today. He reports that his energy level is great.    He reports that he has been using Delta 8, which has been helping him a lot. He reports that he has done a lot of research into Delta 8, and he does not like the way they make it. He reports that it is a chemical extract, and they use acetone and other harsh chemicals to extract out of that plant. He reports that he would like a prescription for Delta 9.      The following portions of the patient's history were  reviewed and updated as appropriate: allergies, current medications, past family history, past medical history, past social history, past surgical history and problem list.    Subjective      Review of Systems:   Review of Systems - See HPI    Past Medical History:   Past Medical History:   Diagnosis Date   • Allergic 1996   • Arthritis    • Blood glucose elevated    • Chronic bronchitis (HCC)    • Colitis    • Colon polyp    • Diverticulitis    • Diverticulosis    • GERD (gastroesophageal reflux disease)    • Hypertension    • IBS (irritable bowel syndrome)    • Kidney stone        Past Surgical History:   Past Surgical History:   Procedure Laterality Date   • COLON RESECTION N/A 8/15/2018    Procedure: OPEN LOW ANTERIOR RESECTION WITH TAKEDOWN OF COLOVESICAL FISTULA, MOBILIZATION OF SPLENIC FLEXIURE, APPENDECTOMY, AND ILEOSTOMY;  Surgeon: Daniel Cline MD;  Location:  [facility];  Service: General   • COLONOSCOPY     • CYSTOSCOPY Bilateral 8/15/2018    Procedure: CYSTOSCOPY WITH BILATERAL URETERAL STENT PLACEMENT;  Surgeon: Daniel Cline MD;  Location:  [facility];  Service: General   • JOINT REPLACEMENT Bilateral     hip       Family History:   Family History   Problem Relation Age of Onset   • Diverticulosis Mother    • Cancer Father    • Diverticulosis Father    • Cancer Maternal Grandfather        Social History:   Social History     Socioeconomic History   • Marital status:    Tobacco Use   • Smoking status: Never   • Smokeless tobacco: Never   Vaping Use   • Vaping Use: Never used   Substance and Sexual Activity   • Alcohol use: Yes     Alcohol/week: 16.0 standard drinks     Types: 16 Shots of liquor per week     Comment: Average 14 drinks a week   • Drug use: No   • Sexual activity: Yes     Partners: Female       Tobacco History:   Social History     Tobacco Use   Smoking Status Never   Smokeless Tobacco Never       Medications:     Current Outpatient Medications:   •  AndroGel Pump 20.25 MG/ACT  (1.62%) gel, 1 pump transdermal every 3-4 days for testosterone deficiency, Disp: 75 g, Rfl: 2  •  aspirin 81 MG chewable tablet, Chew 81 mg Daily., Disp: , Rfl:   •  B Complex Vitamins (VITAMIN B COMPLEX PO), Take  by mouth., Disp: , Rfl:   •  BREO ELLIPTA 200-25 MCG/INH inhaler, As Needed., Disp: , Rfl:   •  Coenzyme Q10 (CoQ10) 100 MG capsule, Take 100 mg by mouth Daily. Take with statin., Disp: 90 each, Rfl: 3  •  fluticasone (FLONASE) 50 MCG/ACT nasal spray, 2 sprays into the nostril(s) as directed by provider Daily., Disp: 16 g, Rfl: 11  •  Glucosamine-Chondroit-Vit C-Mn (GLUCOSAMINE 1500 COMPLEX) capsule, Take 1 capsule by mouth Daily., Disp: , Rfl:   •  guaiFENesin (MUCINEX) 600 MG 12 hr tablet, Take 2 tablets by mouth 2 (Two) Times a Day., Disp: 30 tablet, Rfl: 0  •  HYDROcodone-ibuprofen (VICOPROFEN) 7.5-200 MG per tablet, Every 4 (Four) Hours As Needed., Disp: , Rfl:   •  ibuprofen (ADVIL,MOTRIN) 200 MG tablet, Take 200 mg by mouth Every 6 (Six) Hours As Needed for Mild Pain., Disp: , Rfl:   •  Krill Oil 1000 MG capsule, Take  by mouth., Disp: , Rfl:   •  L-GLUTAMINE PO, Take  by mouth., Disp: , Rfl:   •  lisinopril (PRINIVIL,ZESTRIL) 10 MG tablet, Take 1 tablet by mouth Daily., Disp: 90 tablet, Rfl: 3  •  Melatonin 10 MG tablet, Take  by mouth As Needed., Disp: , Rfl:   •  Milk Thistle 1000 MG capsule, Take  by mouth., Disp: , Rfl:   •  Multiple Vitamins-Minerals (MULTIVITAMIN WITH MINERALS) tablet tablet, Take 1 tablet by mouth Daily., Disp: , Rfl:   •  Ozempic, 0.25 or 0.5 MG/DOSE, 2 MG/1.5ML solution pen-injector, Inject 0.5 mg under the skin into the appropriate area as directed 1 (One) Time Per Week., Disp: 4.5 mL, Rfl: 3  •  tadalafil (CIALIS) 5 MG tablet, Take 5 mg by mouth Daily As Needed for erectile dysfunction., Disp: , Rfl:   •  vitamin C (ASCORBIC ACID) 500 MG tablet, Take 500 mg by mouth Daily., Disp: , Rfl:     Allergies:   Allergies   Allergen Reactions   • Metformin GI Intolerance   •  Acetaminophen Other (See Comments)     GI upset  GI upset         Objective   Objective     Physical Exam:  Vital Signs:   Vitals:    01/13/23 0946   BP: 120/78   BP Location: Left arm   Patient Position: Sitting   Cuff Size: Adult   Pulse: 95   Temp: 97.7 °F (36.5 °C)   TempSrc: Infrared   SpO2: 96%   Weight: 116 kg (255 lb 6.4 oz)     Body mass index is 34.64 kg/m².     Physical Exam  Const: NAD, A & Ox4, Pleasant, Cooperative  Eyes: EOMI, no conjunctivitis  ENT: No nasal discharge present, neck supple  Cardiac: Regular rate and rhythm, no cyanosis  Resp: Respiratory rate within normal limits, no increased work of breathing, no audible wheezing or retractions noted  GI: No distention or ascites  MSK: Motor and sensation grossly intact in bilateral upper extremities  Neurologic: CN II-XII grossly intact  Psych: Appropriate mood and behavior.  Skin: Warm, dry  Procedures/Radiology     Procedures  No radiology results for the last 7 days     Assessment & Plan   Assessment / Plan      Assessment/Plan:   Problems Addressed This Visit  Diagnoses and all orders for this visit:    1. Type 2 diabetes mellitus without complication, without long-term current use of insulin (HCC) (Primary)  Assessment & Plan:  Diabetes is improving, A1c down over the last year from 6.8% to 6.3% in October, stable 6.3% today.  His weight has also come down, he is down 11 pounds since October and over 30 pounds over the last year.  Continue Ozempic 0.5 mg weekly.    Orders:  -     Ozempic, 0.25 or 0.5 MG/DOSE, 2 MG/1.5ML solution pen-injector; Inject 0.5 mg under the skin into the appropriate area as directed 1 (One) Time Per Week.  Dispense: 4.5 mL; Refill: 3  -     POC Glycosylated Hemoglobin (Hb A1C)    2. Primary hypertension    3. Dyslipidemia, goal LDL below 70  Assessment & Plan:  LDL at goal in October, recommended he continue rosuvastatin 10 mg daily but he declines statin use going forward.  He states that in his research there have  "been more deaths from low LDL and high LDL, and does not wish to be on any statin despite his risk.  His baseline LDL is over 100.      Problem List Items Addressed This Visit        Cardiac and Vasculature    HTN (hypertension) (Chronic)    Overview     Lisinopril 10mg         Dyslipidemia, goal LDL below 70 (Chronic)    Overview     He reported side effects of abdominal pain from the rosuvastatin 10 mg, and declines any statin going forward, \"even if it leads to a cardiac event.\"         Current Assessment & Plan     LDL at goal in October, recommended he continue rosuvastatin 10 mg daily but he declines statin use going forward.  He states that in his research there have been more deaths from low LDL and high LDL, and does not wish to be on any statin despite his risk.  His baseline LDL is over 100.            Endocrine and Metabolic    Type 2 diabetes mellitus, without long-term current use of insulin (HCC) - Primary (Chronic)    Current Assessment & Plan     Diabetes is improving, A1c down over the last year from 6.8% to 6.3% in October, stable 6.3% today.  His weight has also come down, he is down 11 pounds since October and over 30 pounds over the last year.  Continue Ozempic 0.5 mg weekly.         Relevant Medications    Ozempic, 0.25 or 0.5 MG/DOSE, 2 MG/1.5ML solution pen-injector    Other Relevant Orders    POC Glycosylated Hemoglobin (Hb A1C) (Completed)       1. Type 2 diabetes mellitus without complication, without long-term current use of insulin (HCC)  - He will continue taking Ozempic 0.5 mg weekly.  - He will continue to work on his diet and exercise.    2. Hyperlipidemia, unspecified hyperlipidemia type  - His cholesterol is very well controlled with an LDL of 46 on rosuvastatin 10 mg.      There are no Patient Instructions on file for this visit.    Follow Up:   Return in about 3 months (around 4/13/2023) for with A1c;.    DO SALUD Sánchez RD  [facility] " [facility]  [address] [facility] [address]  Fax [phone]  Phone [phone]       Transcribed from ambient dictation for Venu Bermudez DO by Araceli Remy.  01/13/23   10:36 EST    Patient or patient representative verbalized consent to the visit recording.  I have personally performed the services described in this document as transcribed by the above individual, and it is both accurate and complete.

## 2023-01-13 NOTE — ASSESSMENT & PLAN NOTE
LDL at goal in October, recommended he continue rosuvastatin 10 mg daily but he declines statin use going forward.  He states that in his research there have been more deaths from low LDL and high LDL, and does not wish to be on any statin despite his risk.  His baseline LDL is over 100.

## 2023-01-13 NOTE — ASSESSMENT & PLAN NOTE
Diabetes is improving, A1c down over the last year from 6.8% to 6.3% in October, stable 6.3% today.  His weight has also come down, he is down 11 pounds since October and over 30 pounds over the last year.  Continue Ozempic 0.5 mg weekly.

## 2023-03-14 ENCOUNTER — PATIENT MESSAGE (OUTPATIENT)
Dept: FAMILY MEDICINE CLINIC | Facility: CLINIC | Age: 58
End: 2023-03-14
Payer: COMMERCIAL

## 2023-03-27 ENCOUNTER — PATIENT MESSAGE (OUTPATIENT)
Dept: FAMILY MEDICINE CLINIC | Facility: CLINIC | Age: 58
End: 2023-03-27
Payer: COMMERCIAL

## 2023-03-27 DIAGNOSIS — E11.9 TYPE 2 DIABETES MELLITUS WITHOUT COMPLICATION, WITHOUT LONG-TERM CURRENT USE OF INSULIN: Primary | ICD-10-CM

## 2023-04-19 ENCOUNTER — PATIENT MESSAGE (OUTPATIENT)
Dept: FAMILY MEDICINE CLINIC | Facility: CLINIC | Age: 58
End: 2023-04-19
Payer: COMMERCIAL

## 2023-05-18 DIAGNOSIS — I10 PRIMARY HYPERTENSION: ICD-10-CM

## 2023-05-18 RX ORDER — LISINOPRIL 10 MG/1
10 TABLET ORAL DAILY
Qty: 90 TABLET | Refills: 1 | Status: SHIPPED | OUTPATIENT
Start: 2023-05-18

## 2023-05-18 RX ORDER — LISINOPRIL 10 MG/1
10 TABLET ORAL DAILY
Qty: 90 TABLET | Refills: 3 | OUTPATIENT
Start: 2023-05-18

## 2023-05-30 ENCOUNTER — TELEPHONE (OUTPATIENT)
Dept: CARDIOLOGY | Facility: CLINIC | Age: 58
End: 2023-05-30

## 2023-05-30 NOTE — TELEPHONE ENCOUNTER
Patient called and left a message. I tried to call him back but he did not answer. I left a message.

## 2023-06-06 DIAGNOSIS — E11.9 TYPE 2 DIABETES MELLITUS WITHOUT COMPLICATION, WITHOUT LONG-TERM CURRENT USE OF INSULIN: Chronic | ICD-10-CM

## 2023-06-06 DIAGNOSIS — E34.9 TESTOSTERONE DEFICIENCY: ICD-10-CM

## 2023-06-06 DIAGNOSIS — I25.10 CORONARY ARTERY DISEASE INVOLVING NATIVE HEART WITHOUT ANGINA PECTORIS, UNSPECIFIED VESSEL OR LESION TYPE: ICD-10-CM

## 2023-06-07 ENCOUNTER — PATIENT MESSAGE (OUTPATIENT)
Dept: FAMILY MEDICINE CLINIC | Facility: CLINIC | Age: 58
End: 2023-06-07
Payer: COMMERCIAL

## 2023-06-07 DIAGNOSIS — E34.9 TESTOSTERONE DEFICIENCY: Primary | ICD-10-CM

## 2023-06-07 RX ORDER — SEMAGLUTIDE 1.34 MG/ML
0.5 INJECTION, SOLUTION SUBCUTANEOUS WEEKLY
Qty: 4.5 ML | Refills: 3 | Status: SHIPPED | OUTPATIENT
Start: 2023-06-07

## 2023-06-07 RX ORDER — ASPIRIN 81 MG/1
81 TABLET, CHEWABLE ORAL DAILY
Qty: 90 TABLET | Refills: 3 | Status: SHIPPED | OUTPATIENT
Start: 2023-06-07

## 2023-06-07 RX ORDER — VITAMIN B COMPLEX
100 TABLET ORAL DAILY
Qty: 90 EACH | Refills: 3 | Status: SHIPPED | OUTPATIENT
Start: 2023-06-07

## 2023-06-07 RX ORDER — TESTOSTERONE 16.2 MG/G
GEL TRANSDERMAL
Qty: 75 G | Refills: 2 | Status: SHIPPED | OUTPATIENT
Start: 2023-06-07 | End: 2023-06-09

## 2023-06-08 ENCOUNTER — PRIOR AUTHORIZATION (OUTPATIENT)
Dept: FAMILY MEDICINE CLINIC | Facility: CLINIC | Age: 58
End: 2023-06-08
Payer: COMMERCIAL

## 2023-06-08 NOTE — TELEPHONE ENCOUNTER
Completed Prior Auth for Androgel    [id number]  Awaiting determination from [employer]

## 2023-06-09 RX ORDER — TESTOSTERONE GEL, 1% 10 MG/G
50 GEL TRANSDERMAL DAILY
Qty: 30 EACH | Refills: 2 | Status: SHIPPED | OUTPATIENT
Start: 2023-06-09

## 2023-07-27 ENCOUNTER — OFFICE VISIT (OUTPATIENT)
Dept: FAMILY MEDICINE CLINIC | Facility: CLINIC | Age: 58
End: 2023-07-27
Payer: COMMERCIAL

## 2023-07-27 ENCOUNTER — LAB (OUTPATIENT)
Dept: LAB | Facility: HOSPITAL | Age: 58
End: 2023-07-27
Payer: COMMERCIAL

## 2023-07-27 VITALS
TEMPERATURE: 98.6 F | SYSTOLIC BLOOD PRESSURE: 120 MMHG | DIASTOLIC BLOOD PRESSURE: 80 MMHG | HEIGHT: 72 IN | WEIGHT: 258.4 LBS | BODY MASS INDEX: 35 KG/M2 | HEART RATE: 68 BPM

## 2023-07-27 DIAGNOSIS — E78.5 DYSLIPIDEMIA, GOAL LDL BELOW 70: Chronic | ICD-10-CM

## 2023-07-27 DIAGNOSIS — Z00.00 WELL ADULT EXAM: Primary | ICD-10-CM

## 2023-07-27 DIAGNOSIS — I10 PRIMARY HYPERTENSION: Chronic | ICD-10-CM

## 2023-07-27 DIAGNOSIS — E11.9 TYPE 2 DIABETES MELLITUS WITHOUT COMPLICATION, WITHOUT LONG-TERM CURRENT USE OF INSULIN: ICD-10-CM

## 2023-07-27 DIAGNOSIS — I25.10 CORONARY ARTERY DISEASE INVOLVING NATIVE CORONARY ARTERY OF NATIVE HEART WITHOUT ANGINA PECTORIS: ICD-10-CM

## 2023-07-27 LAB
ALBUMIN SERPL-MCNC: 4.7 G/DL (ref 3.5–5.2)
ALBUMIN UR-MCNC: <1.2 MG/DL
ALBUMIN/GLOB SERPL: 2.1 G/DL
ALP SERPL-CCNC: 65 U/L (ref 39–117)
ALT SERPL W P-5'-P-CCNC: 18 U/L (ref 1–41)
ANION GAP SERPL CALCULATED.3IONS-SCNC: 11 MMOL/L (ref 5–15)
AST SERPL-CCNC: 18 U/L (ref 1–40)
BILIRUB SERPL-MCNC: 0.5 MG/DL (ref 0–1.2)
BILIRUB UR QL STRIP: NEGATIVE
BUN SERPL-MCNC: 16 MG/DL (ref 8–23)
BUN/CREAT SERPL: 16.2 (ref 7–25)
CALCIUM SPEC-SCNC: 9.6 MG/DL (ref 8.6–10.5)
CHLORIDE SERPL-SCNC: 105 MMOL/L (ref 98–107)
CHOLEST SERPL-MCNC: 165 MG/DL (ref 0–200)
CLARITY UR: ABNORMAL
CO2 SERPL-SCNC: 25 MMOL/L (ref 22–29)
COLOR UR: YELLOW
CREAT SERPL-MCNC: 0.99 MG/DL (ref 0.76–1.27)
CREAT UR-MCNC: 165.6 MG/DL
CRP SERPL-MCNC: 1.42 MG/DL (ref 0.01–0.5)
EGFRCR SERPLBLD CKD-EPI 2021: 87.2 ML/MIN/1.73
GLOBULIN UR ELPH-MCNC: 2.2 GM/DL
GLUCOSE SERPL-MCNC: 134 MG/DL (ref 65–99)
GLUCOSE UR STRIP-MCNC: NEGATIVE MG/DL
HBA1C MFR BLD: 6.2 % (ref 4.8–5.6)
HDLC SERPL-MCNC: 50 MG/DL (ref 40–60)
HGB UR QL STRIP.AUTO: NEGATIVE
KETONES UR QL STRIP: NEGATIVE
LDLC SERPL CALC-MCNC: 90 MG/DL (ref 0–100)
LDLC/HDLC SERPL: 1.73 {RATIO}
LEUKOCYTE ESTERASE UR QL STRIP.AUTO: NEGATIVE
MICROALBUMIN/CREAT UR: NORMAL MG/G{CREAT}
NITRITE UR QL STRIP: NEGATIVE
PH UR STRIP.AUTO: 5.5 [PH] (ref 5–8)
POTASSIUM SERPL-SCNC: 4.4 MMOL/L (ref 3.5–5.2)
PROT SERPL-MCNC: 6.9 G/DL (ref 6–8.5)
PROT UR QL STRIP: NEGATIVE
SODIUM SERPL-SCNC: 141 MMOL/L (ref 136–145)
SP GR UR STRIP: 1.02 (ref 1–1.03)
TRIGL SERPL-MCNC: 142 MG/DL (ref 0–150)
TSH SERPL DL<=0.05 MIU/L-ACNC: 0.75 UIU/ML (ref 0.27–4.2)
UROBILINOGEN UR QL STRIP: ABNORMAL
VLDLC SERPL-MCNC: 25 MG/DL (ref 5–40)

## 2023-07-27 PROCEDURE — 36415 COLL VENOUS BLD VENIPUNCTURE: CPT | Performed by: FAMILY MEDICINE

## 2023-07-27 PROCEDURE — 80061 LIPID PANEL: CPT | Performed by: PHYSICIAN ASSISTANT

## 2023-07-27 PROCEDURE — 80053 COMPREHEN METABOLIC PANEL: CPT | Performed by: PHYSICIAN ASSISTANT

## 2023-07-27 PROCEDURE — 84443 ASSAY THYROID STIM HORMONE: CPT | Performed by: PHYSICIAN ASSISTANT

## 2023-07-27 PROCEDURE — 82043 UR ALBUMIN QUANTITATIVE: CPT | Performed by: FAMILY MEDICINE

## 2023-07-27 PROCEDURE — 86141 C-REACTIVE PROTEIN HS: CPT | Performed by: PHYSICIAN ASSISTANT

## 2023-07-27 PROCEDURE — 83695 ASSAY OF LIPOPROTEIN(A): CPT | Performed by: PHYSICIAN ASSISTANT

## 2023-07-27 PROCEDURE — 83036 HEMOGLOBIN GLYCOSYLATED A1C: CPT | Performed by: FAMILY MEDICINE

## 2023-07-27 PROCEDURE — 82570 ASSAY OF URINE CREATININE: CPT | Performed by: FAMILY MEDICINE

## 2023-07-27 PROCEDURE — 81003 URINALYSIS AUTO W/O SCOPE: CPT | Performed by: FAMILY MEDICINE

## 2023-07-27 RX ORDER — SEMAGLUTIDE 1.34 MG/ML
1 INJECTION, SOLUTION SUBCUTANEOUS WEEKLY
Qty: 9 ML | Refills: 3 | Status: SHIPPED | OUTPATIENT
Start: 2023-07-27

## 2023-07-27 NOTE — PATIENT INSTRUCTIONS
Coronary artery calcium scoring via CT scan can be obtained for $100 through [facility].  Currently these are being done at their [place] location at [address].  Their phone number is [phone].  When open, they are also done at their [place] location at [address]. [email or web address]  -This is an out-of-pocket cost and does not require an order or to be run through insurance.  It can be scheduled by patients.

## 2023-07-28 LAB — LPA SERPL-SCNC: 168.5 NMOL/L

## 2023-08-01 ENCOUNTER — PATIENT MESSAGE (OUTPATIENT)
Dept: FAMILY MEDICINE CLINIC | Facility: CLINIC | Age: 58
End: 2023-08-01
Payer: COMMERCIAL

## 2023-08-04 ENCOUNTER — PATIENT MESSAGE (OUTPATIENT)
Dept: FAMILY MEDICINE CLINIC | Facility: CLINIC | Age: 58
End: 2023-08-04
Payer: COMMERCIAL

## 2023-08-13 NOTE — PROGRESS NOTES
Annual Well Adult Visit     Patient Name: Tripp Lantigua  : 1965   MRN: [id number]   Care Team: Patient Care Team:  Venu Bermudez DO as PCP - General (Family Medicine)  Wallace Adhikari PA as Physician Assistant (Cardiology)    Chief Complaint:    Chief Complaint   Patient presents with    Annual Exam       History of Present Illness: Tripp Lantigua is a 58 y.o. male who presents today for annual physical exam and preventative care.    HPI    The following portions of the patient's history were reviewed and updated as appropriate: allergies, current medications, past family history, past medical history, past social history, past surgical history and problem list.       Allied Screenings    N/A         Date      Eye Exam       []              []   Up to date    Location:   []   Recommended       Counseled every 2 years in those without known issues, yearly if wearing glasses or contacts      Dental Exam       []           []   Up to date   Location:   []   Recommended       Counseled, recommended cleanings every 6 months, daily brushing and flossing        Skin Cancer Screening        []            []   Up to date   Location:   []   Recommended Counseled on regular sunscreen wear, self-skin checks      Obesity Counseling        []        []   Complete   []   Nutritionist referral   []   Declined Counseled on moderate portions, low meat diet focusing on whole foods and plant-based protein          Additional Testing         Date      Colorectal Screening        []   N/A   []   Ordered today   []   Complete        Date:     Where:         Pap        []   N/A   []   Patient to schedule   []   Complete    Date:     Where:         Mammogram           []   N/A   []   Patient to schedule   []   Complete Date:     Where:      PSA  (Over age 50)     []   N/A   []   Ordered today   []   Complete    Date:     Where:      US Aorta  (For male with >20 cigarette history, age 65)     []   N/A   []   Ordered  today   []   Complete    Date:     Where:      CT for Smoker  (Age 55-75, 30 pk yr)     []   N/A   []   Ordered today   []   Complete    Date:     Where:      Bone Density/DEXA        []   N/A   []   Ordered today   []   Complete    Date:     Follow-up:      Hep. C     []   N/A   []   Ordered today   []   Complete         Subjective      Review of Systems:   Review of Systems - See HPI    Past Medical History:   Past Medical History:   Diagnosis Date    Allergic 1996    Arthritis     Blood glucose elevated     Chronic bronchitis     Colitis     Colon polyp     Diverticulitis     Diverticulosis     GERD (gastroesophageal reflux disease)     Hypertension     IBS (irritable bowel syndrome)     Kidney stone        Past Surgical History:   Past Surgical History:   Procedure Laterality Date    COLON RESECTION N/A 8/15/2018    Procedure: OPEN LOW ANTERIOR RESECTION WITH TAKEDOWN OF COLOVESICAL FISTULA, MOBILIZATION OF SPLENIC FLEXIURE, APPENDECTOMY, AND ILEOSTOMY;  Surgeon: Daniel Cline MD;  Location:  [facility];  Service: General    COLONOSCOPY      CYSTOSCOPY Bilateral 8/15/2018    Procedure: CYSTOSCOPY WITH BILATERAL URETERAL STENT PLACEMENT;  Surgeon: Daniel Cline MD;  Location:  [facility];  Service: General    JOINT REPLACEMENT Bilateral     hip       Family History:   Family History   Problem Relation Age of Onset    Diverticulosis Mother     Cancer Father     Diverticulosis Father     Cancer Maternal Grandfather        Social History:   Social History     Socioeconomic History    Marital status:    Tobacco Use    Smoking status: Never    Smokeless tobacco: Never   Vaping Use    Vaping Use: Never used   Substance and Sexual Activity    Alcohol use: Yes     Alcohol/week: 16.0 standard drinks     Types: 16 Shots of liquor per week     Comment: Average 14 drinks a week    Drug use: No    Sexual activity: Yes     Partners: Female       Social History     Social History Narrative    Not on file        Tobacco  History:   Social History     Tobacco Use   Smoking Status Never   Smokeless Tobacco Never       Medications:     Current Outpatient Medications:     aspirin 81 MG chewable tablet, Chew 1 tablet Daily., Disp: 90 tablet, Rfl: 3    B Complex Vitamins (VITAMIN B COMPLEX PO), Take  by mouth., Disp: , Rfl:     BREO ELLIPTA 200-25 MCG/INH inhaler, As Needed., Disp: , Rfl:     Coenzyme Q10 (CoQ10) 100 MG capsule, Take 1 capsule by mouth Daily. Take with statin., Disp: 90 each, Rfl: 3    fluticasone (FLONASE) 50 MCG/ACT nasal spray, 2 sprays into the nostril(s) as directed by provider Daily., Disp: 16 g, Rfl: 11    Glucosamine-Chondroit-Vit C-Mn (GLUCOSAMINE 1500 COMPLEX) capsule, Take 1,500 mg by mouth Daily., Disp: , Rfl:     HYDROcodone-ibuprofen (VICOPROFEN) 7.5-200 MG per tablet, Every 4 (Four) Hours As Needed., Disp: , Rfl:     ibuprofen (ADVIL,MOTRIN) 200 MG tablet, Take 1 tablet by mouth Every 6 (Six) Hours As Needed for Mild Pain., Disp: , Rfl:     Krill Oil 1000 MG capsule, Take  by mouth., Disp: , Rfl:     L-GLUTAMINE PO, Take  by mouth., Disp: , Rfl:     lisinopril (PRINIVIL,ZESTRIL) 10 MG tablet, TAKE 1 TABLET BY MOUTH DAILY, Disp: 90 tablet, Rfl: 1    Melatonin 10 MG tablet, Take  by mouth As Needed., Disp: , Rfl:     Milk Thistle 1000 MG capsule, Take  by mouth., Disp: , Rfl:     Multiple Vitamins-Minerals (MULTIVITAMIN WITH MINERALS) tablet tablet, Take 1 tablet by mouth Daily., Disp: , Rfl:     tadalafil (CIALIS) 5 MG tablet, Take 1 tablet by mouth Daily As Needed for Erectile Dysfunction., Disp: , Rfl:     testosterone (ANDROGEL) 50 MG/5GM (1%) gel gel, Place 50 mg on the skin as directed by provider Daily., Disp: 30 each, Rfl: 2    vitamin C (ASCORBIC ACID) 500 MG tablet, Take 1 tablet by mouth Daily., Disp: , Rfl:     Ozempic, 1 MG/DOSE, 4 MG/3ML solution pen-injector, Inject 1 mg under the skin into the appropriate area as directed 1 (One) Time Per Week., Disp: 9 mL, Rfl: 3    Immunizations:  "  Immunization History   Administered Date(s) Administered    COVID-19 (PFIZER) Purple Cap Monovalent 02/26/2021, 03/19/2021, 11/10/2021    Flu Vaccine Split Quad 10/01/2017, 09/21/2018    Fluzone >6mos 09/21/2018, 09/01/2019    Hep B, Unspecified 06/10/2011    Hepatitis A 06/01/2018, 06/19/2018, 01/04/2019    Hepatitis B Adult/Adolescent IM 03/24/2017, 05/25/2017    Influenza, Unspecified 10/01/2017, 10/15/2019    Pneumococcal Polysaccharide (PPSV23) 03/24/2017    Shingrix 10/02/2019, 12/16/2019    Tdap 06/10/2011        Allergies:   Allergies   Allergen Reactions    Metformin GI Intolerance    Acetaminophen Other (See Comments)     GI upset  GI upset         Objective   Objective     Physical Exam:  Vital Signs:   Vitals:    07/27/23 0902   BP: 120/80   BP Location: Left arm   Patient Position: Sitting   Cuff Size: Adult   Pulse: 68   Temp: 98.6 øF (37 øC)   TempSrc: Infrared   Weight: 117 kg (258 lb 6.4 oz)   Height: 182.9 cm (72\")     Body mass index is 35.05 kg/mý.     Physical Exam  Nursing note reviewed  Const: NAD, A&Ox4, Pleasant, Cooperative  Eyes: EOMI, no conjunctivitis  ENT: No nasal discharge present, neck supple  Cardiac: Regular rate and rhythm, no cyanosis  Resp: Respiratory rate within normal limits, no increased work of breathing, no audible wheezing or retractions noted  GI: No distention or ascites  MSK: Motor and sensation grossly intact in bilateral upper extremities  Neurologic: CN II-XII grossly intact  Psych: Appropriate mood and behavior.  Skin: Pink, warm, dry  PHQ-2 Depression Screening  Little interest or pleasure in doing things?     Feeling down, depressed, or hopeless?     PHQ-2 Total Score       Procedures/Radiology     Procedures  No radiology results for the last 7 days     Assessment & Plan   Assessment / Plan      Assessment/Plan:   Problems Addressed This Visit  Diagnoses and all orders for this visit:    1. Well adult exam (Primary)  -     Lipid Panel; Future  -     Hemoglobin " "A1c; Future  -     Comprehensive Metabolic Panel; Future  -     Urinalysis With Microscopic If Indicated (No Culture) - Urine, Clean Catch; Future  -     TSH; Future  -     Microalbumin / Creatinine Urine Ratio - Urine, Clean Catch; Future  -     Lipid Panel  -     Hemoglobin A1c  -     Comprehensive Metabolic Panel  -     Urinalysis With Microscopic If Indicated (No Culture) - Urine, Clean Catch  -     TSH  -     Microalbumin / Creatinine Urine Ratio - Urine, Clean Catch    2. Type 2 diabetes mellitus without complication, without long-term current use of insulin  -     Ambulatory Referral for Diabetic Eye Exam-Optometry  -     Ozempic, 1 MG/DOSE, 4 MG/3ML solution pen-injector; Inject 1 mg under the skin into the appropriate area as directed 1 (One) Time Per Week.  Dispense: 9 mL; Refill: 3  -     Hemoglobin A1c; Future  -     Hemoglobin A1c    3. Coronary artery disease involving native coronary artery of native heart without angina pectoris  -     High Sensitivity CRP  -     Lipoprotein A (LPA)    4. Dyslipidemia, goal LDL below 70  Assessment & Plan:  Recheck lipid profile today with LPA and hsCRP      5. Primary hypertension  -     High Sensitivity CRP  -     Lipoprotein A (LPA)      Problem List Items Addressed This Visit          Cardiac and Vasculature    HTN (hypertension) (Chronic)    Overview     Lisinopril 10mg         Dyslipidemia, goal LDL below 70 (Chronic)    Overview     He reported side effects of abdominal pain from the rosuvastatin 10 mg, and declines any statin going forward, \"even if it leads to a cardiac event.\"         Current Assessment & Plan     Recheck lipid profile today with LPA and hsCRP         Coronary artery disease involving native heart without angina pectoris    Overview     CACS- .6, total 161.7  Rosuvastatin 10mg            Endocrine and Metabolic    Type 2 diabetes mellitus, without long-term current use of insulin (Chronic)    Relevant Medications    Ozempic, 1 " MG/DOSE, 4 MG/3ML solution pen-injector    Other Relevant Orders    Ambulatory Referral for Diabetic Eye Exam-Optometry    Hemoglobin A1c (Completed)     Other Visit Diagnoses       Well adult exam    -  Primary    Relevant Orders    Lipid Panel (Completed)    Hemoglobin A1c (Completed)    Comprehensive Metabolic Panel (Completed)    Urinalysis With Microscopic If Indicated (No Culture) - Urine, Clean Catch (Completed)    TSH (Completed)    Microalbumin / Creatinine Urine Ratio - Urine, Clean Catch (Completed)            See patient diagnoses and orders along with patient instructions for assessment, plan, and changes to care for patient.    The preventative exam has been reviewed in detail.  The patient has been fully counseled on preventative guidelines for vaccines, cancer screenings, and other health maintenance needs. The patient was counseled on maintaining a lifestyle to promote good health and to minimize chronic diseases.  The patient has been assisted with scheduling healthcare procedures for the coming year and given a written document outlining these recommendations. Age-appropriate screening measures have been ordered for the patient today as indicated above.    Patient Instructions   Coronary artery calcium scoring via CT scan can be obtained for $100 through [facility] [employer].  Currently these are being done at their [place] location at [address].  Their phone number is [phone].  When open, they are also done at their [place] location at [address]. [email or web address]  -This is an out-of-pocket cost and does not require an order or to be run through insurance.  It can be scheduled by patients.     Follow Up:   Return in about 3 months (around 10/27/2023) for with A1c;.    DO SALUD Lopes RD  [facility]  [address]  [facility] [address]  Fax [phone]  Phone  [phone]

## 2023-08-18 ENCOUNTER — TELEPHONE (OUTPATIENT)
Dept: FAMILY MEDICINE CLINIC | Facility: CLINIC | Age: 58
End: 2023-08-18

## 2023-08-18 NOTE — TELEPHONE ENCOUNTER
Caller: Tripp Lantigua    Relationship: Self    Best call back number: [phone]         Additional notes: PATIENT STATED HE WANTS THE ANNUAL BLOOD TESTS FROM THE LAST 3 YRS MAILED TO HIM AT     [address] [place] [address]     PATIENT ALSO STATED HE LOVES HIS PCP BUT HE IS NOT RESPONDING TO HIS [facility] MESSAGES. AT ONE POINT HE ASKED ABOUT A TEST RESULT AND PCP ADVISED HIS NOTES WERE IN THE AFTER CARE SUMMARY . PATIENT STATED HE SEE'S NOTHING . HE WOULD LIKE A RESPONSE AND PCP CAN CALL OR RESPOND THROUGH [employer]. HE WANTS TO KEEP UP WITH HIS BLOOD WORK RESULTS BUT FINDS IT IS DIFFICULT THROUGH [employer] AND DOES HAVE QUESTIONS THAT HE NEEDS TO DISCUSS

## 2023-10-10 ENCOUNTER — PATIENT MESSAGE (OUTPATIENT)
Dept: FAMILY MEDICINE CLINIC | Facility: CLINIC | Age: 58
End: 2023-10-10
Payer: COMMERCIAL

## 2023-10-10 DIAGNOSIS — E11.9 TYPE 2 DIABETES MELLITUS WITHOUT COMPLICATION, WITHOUT LONG-TERM CURRENT USE OF INSULIN: ICD-10-CM

## 2023-10-10 RX ORDER — SEMAGLUTIDE 1.34 MG/ML
1 INJECTION, SOLUTION SUBCUTANEOUS WEEKLY
Qty: 9 ML | Refills: 3 | Status: SHIPPED | OUTPATIENT
Start: 2023-10-10 | End: 2023-10-11

## 2023-10-11 RX ORDER — SEMAGLUTIDE 1.34 MG/ML
0.5 INJECTION, SOLUTION SUBCUTANEOUS WEEKLY
Qty: 1.5 ML | Refills: 2 | Status: SHIPPED | OUTPATIENT
Start: 2023-10-11

## 2023-10-30 ENCOUNTER — LAB (OUTPATIENT)
Dept: LAB | Facility: HOSPITAL | Age: 58
End: 2023-10-30
Payer: COMMERCIAL

## 2023-10-30 ENCOUNTER — OFFICE VISIT (OUTPATIENT)
Dept: FAMILY MEDICINE CLINIC | Facility: CLINIC | Age: 58
End: 2023-10-30
Payer: COMMERCIAL

## 2023-10-30 VITALS
SYSTOLIC BLOOD PRESSURE: 110 MMHG | HEIGHT: 72 IN | WEIGHT: 254.4 LBS | BODY MASS INDEX: 34.46 KG/M2 | DIASTOLIC BLOOD PRESSURE: 76 MMHG

## 2023-10-30 DIAGNOSIS — I25.10 CORONARY ARTERY DISEASE INVOLVING NATIVE CORONARY ARTERY OF NATIVE HEART WITHOUT ANGINA PECTORIS: ICD-10-CM

## 2023-10-30 DIAGNOSIS — E78.5 DYSLIPIDEMIA, GOAL LDL BELOW 70: ICD-10-CM

## 2023-10-30 DIAGNOSIS — E11.9 TYPE 2 DIABETES MELLITUS WITHOUT COMPLICATION, WITHOUT LONG-TERM CURRENT USE OF INSULIN: Primary | ICD-10-CM

## 2023-10-30 DIAGNOSIS — I10 PRIMARY HYPERTENSION: ICD-10-CM

## 2023-10-30 LAB
ALBUMIN SERPL-MCNC: 4.4 G/DL (ref 3.5–5.2)
ALBUMIN/GLOB SERPL: 2 G/DL
ALP SERPL-CCNC: 70 U/L (ref 39–117)
ALT SERPL W P-5'-P-CCNC: 20 U/L (ref 1–41)
ANION GAP SERPL CALCULATED.3IONS-SCNC: 10 MMOL/L (ref 5–15)
AST SERPL-CCNC: 12 U/L (ref 1–40)
BASOPHILS # BLD AUTO: 0.01 10*3/MM3 (ref 0–0.2)
BASOPHILS NFR BLD AUTO: 0.2 % (ref 0–1.5)
BILIRUB SERPL-MCNC: 0.5 MG/DL (ref 0–1.2)
BUN SERPL-MCNC: 16 MG/DL (ref 8–23)
BUN/CREAT SERPL: 19.8 (ref 7–25)
CALCIUM SPEC-SCNC: 9.4 MG/DL (ref 8.6–10.5)
CHLORIDE SERPL-SCNC: 104 MMOL/L (ref 98–107)
CO2 SERPL-SCNC: 25 MMOL/L (ref 22–29)
CREAT SERPL-MCNC: 0.81 MG/DL (ref 0.76–1.27)
CRP SERPL-MCNC: 0.12 MG/DL (ref 0.01–0.5)
DEPRECATED RDW RBC AUTO: 38.3 FL (ref 37–54)
EGFRCR SERPLBLD CKD-EPI 2021: 100.9 ML/MIN/1.73
EOSINOPHIL # BLD AUTO: 0.03 10*3/MM3 (ref 0–0.4)
EOSINOPHIL NFR BLD AUTO: 0.6 % (ref 0.3–6.2)
ERYTHROCYTE [DISTWIDTH] IN BLOOD BY AUTOMATED COUNT: 12.5 % (ref 12.3–15.4)
EXPIRATION DATE: ABNORMAL
GLOBULIN UR ELPH-MCNC: 2.2 GM/DL
GLUCOSE SERPL-MCNC: 129 MG/DL (ref 65–99)
HBA1C MFR BLD: 6 % (ref 4.5–5.7)
HCT VFR BLD AUTO: 44.1 % (ref 37.5–51)
HGB BLD-MCNC: 14.9 G/DL (ref 13–17.7)
IMM GRANULOCYTES # BLD AUTO: 0.02 10*3/MM3 (ref 0–0.05)
IMM GRANULOCYTES NFR BLD AUTO: 0.4 % (ref 0–0.5)
LYMPHOCYTES # BLD AUTO: 0.88 10*3/MM3 (ref 0.7–3.1)
LYMPHOCYTES NFR BLD AUTO: 16.5 % (ref 19.6–45.3)
Lab: ABNORMAL
MCH RBC QN AUTO: 28.9 PG (ref 26.6–33)
MCHC RBC AUTO-ENTMCNC: 33.8 G/DL (ref 31.5–35.7)
MCV RBC AUTO: 85.5 FL (ref 79–97)
MONOCYTES # BLD AUTO: 0.4 10*3/MM3 (ref 0.1–0.9)
MONOCYTES NFR BLD AUTO: 7.5 % (ref 5–12)
NEUTROPHILS NFR BLD AUTO: 3.99 10*3/MM3 (ref 1.7–7)
NEUTROPHILS NFR BLD AUTO: 74.8 % (ref 42.7–76)
NRBC BLD AUTO-RTO: 0 /100 WBC (ref 0–0.2)
PLATELET # BLD AUTO: 110 10*3/MM3 (ref 140–450)
PMV BLD AUTO: 12.8 FL (ref 6–12)
POTASSIUM SERPL-SCNC: 4.1 MMOL/L (ref 3.5–5.2)
PROT SERPL-MCNC: 6.6 G/DL (ref 6–8.5)
RBC # BLD AUTO: 5.16 10*6/MM3 (ref 4.14–5.8)
SODIUM SERPL-SCNC: 139 MMOL/L (ref 136–145)
WBC NRBC COR # BLD: 5.33 10*3/MM3 (ref 3.4–10.8)

## 2023-10-30 PROCEDURE — 82172 ASSAY OF APOLIPOPROTEIN: CPT | Performed by: FAMILY MEDICINE

## 2023-10-30 PROCEDURE — 85025 COMPLETE CBC W/AUTO DIFF WBC: CPT | Performed by: FAMILY MEDICINE

## 2023-10-30 PROCEDURE — 80061 LIPID PANEL: CPT | Performed by: FAMILY MEDICINE

## 2023-10-30 PROCEDURE — 86141 C-REACTIVE PROTEIN HS: CPT | Performed by: FAMILY MEDICINE

## 2023-10-30 PROCEDURE — 80053 COMPREHEN METABOLIC PANEL: CPT | Performed by: FAMILY MEDICINE

## 2023-10-30 PROCEDURE — 83695 ASSAY OF LIPOPROTEIN(A): CPT | Performed by: FAMILY MEDICINE

## 2023-10-30 PROCEDURE — 0024U GLYCA NUC MR SPECTRSC QUAN: CPT | Performed by: FAMILY MEDICINE

## 2023-10-30 PROCEDURE — 81599 UNLISTED MAAA: CPT | Performed by: FAMILY MEDICINE

## 2023-10-30 RX ORDER — LISINOPRIL 10 MG/1
10 TABLET ORAL 2 TIMES WEEKLY
Start: 2023-10-30

## 2023-10-30 NOTE — PROGRESS NOTES
Established Patient Office Visit      Patient Name: Tripp Lantigua  : 1965   MRN: [id number]   Care Team: Patient Care Team:  Venu Bermudez DO as PCP - General (Family Medicine)  Wallace Adhikari PA as Physician Assistant (Cardiology)    Chief Complaint:    Chief Complaint   Patient presents with    Diabetes     3 month follow-up a1c       History of Present Illness: Tripp Lantigua is a 58 y.o. male who is here today for chief complaint.    HPI    Taking 1mg dose Ozempic, feeling good.    Very high lipoA with normal lipid profile. Has tried rosuvastatin in the past, will try half tablet (5mg instead of 10mg). Was on atorvastatin a few years ago.    This patient is accompanied by their self who contributes to the history of their care.    The following portions of the patient's history were reviewed and updated as appropriate: allergies, current medications, past family history, past medical history, past social history, past surgical history and problem list.    Subjective      Review of Systems:   Review of Systems - See HPI    Past Medical History:   Past Medical History:   Diagnosis Date    Allergic     Arthritis     Blood glucose elevated     Chronic bronchitis     Colitis     Colon polyp     Diverticulitis     Diverticulosis     GERD (gastroesophageal reflux disease)     Hypertension     IBS (irritable bowel syndrome)     Kidney stone        Past Surgical History:   Past Surgical History:   Procedure Laterality Date    COLON RESECTION N/A 8/15/2018    Procedure: OPEN LOW ANTERIOR RESECTION WITH TAKEDOWN OF COLOVESICAL FISTULA, MOBILIZATION OF SPLENIC FLEXIURE, APPENDECTOMY, AND ILEOSTOMY;  Surgeon: Daniel Cline MD;  Location: [facility] OR;  Service: General    COLONOSCOPY      CYSTOSCOPY Bilateral 8/15/2018    Procedure: CYSTOSCOPY WITH BILATERAL URETERAL STENT PLACEMENT;  Surgeon: Daniel Cline MD;  Location: [facility] OR;  Service: General    JOINT REPLACEMENT Bilateral     hip        Family History:   Family History   Problem Relation Age of Onset    Diverticulosis Mother     Cancer Father     Diverticulosis Father     Cancer Maternal Grandfather        Social History:   Social History     Socioeconomic History    Marital status:    Tobacco Use    Smoking status: Never    Smokeless tobacco: Never   Vaping Use    Vaping Use: Never used   Substance and Sexual Activity    Alcohol use: Yes     Alcohol/week: 16.0 standard drinks of alcohol     Types: 16 Shots of liquor per week     Comment: Average 14 drinks a week    Drug use: No    Sexual activity: Yes     Partners: Female       Tobacco History:   Social History     Tobacco Use   Smoking Status Never   Smokeless Tobacco Never       Medications:     Current Outpatient Medications:     aspirin 81 MG chewable tablet, Chew 1 tablet Daily., Disp: 90 tablet, Rfl: 3    B Complex Vitamins (VITAMIN B COMPLEX PO), Take  by mouth., Disp: , Rfl:     BREO ELLIPTA 200-25 MCG/INH inhaler, As Needed., Disp: , Rfl:     Coenzyme Q10 (CoQ10) 100 MG capsule, Take 1 capsule by mouth Daily. Take with statin., Disp: 90 each, Rfl: 3    fluticasone (FLONASE) 50 MCG/ACT nasal spray, 2 sprays into the nostril(s) as directed by provider Daily., Disp: 16 g, Rfl: 11    Glucosamine-Chondroit-Vit C-Mn (GLUCOSAMINE 1500 COMPLEX) capsule, Take 1,500 mg by mouth Daily., Disp: , Rfl:     ibuprofen (ADVIL,MOTRIN) 200 MG tablet, Take 1 tablet by mouth Every 6 (Six) Hours As Needed for Mild Pain., Disp: , Rfl:     Krill Oil 1000 MG capsule, Take  by mouth., Disp: , Rfl:     L-GLUTAMINE PO, Take  by mouth., Disp: , Rfl:     lisinopril (PRINIVIL,ZESTRIL) 10 MG tablet, Take 1 tablet by mouth 2 (Two) Times a Week., Disp: , Rfl:     Melatonin 10 MG tablet, Take  by mouth As Needed., Disp: , Rfl:     Milk Thistle 1000 MG capsule, Take  by mouth., Disp: , Rfl:     Multiple Vitamins-Minerals (MULTIVITAMIN WITH MINERALS) tablet tablet, Take 1 tablet by mouth Daily., Disp: , Rfl:      "Semaglutide,0.25 or 0.5MG/DOS, (Ozempic, 0.25 or 0.5 MG/DOSE,) 2 MG/1.5ML solution pen-injector, Inject 0.5 mg under the skin into the appropriate area as directed 1 (One) Time Per Week., Disp: 1.5 mL, Rfl: 2    tadalafil (CIALIS) 5 MG tablet, Take 1 tablet by mouth Daily As Needed for Erectile Dysfunction., Disp: , Rfl:     testosterone (ANDROGEL) 50 MG/5GM (1%) gel gel, Place 50 mg on the skin as directed by provider Daily., Disp: 30 each, Rfl: 2    vitamin C (ASCORBIC ACID) 500 MG tablet, Take 1 tablet by mouth Daily., Disp: , Rfl:     pitavastatin calcium (LIVALO) 2 MG tablet tablet, Take 1 tablet by mouth Every Night., Disp: 90 tablet, Rfl: 0    Allergies:   Allergies   Allergen Reactions    Metformin GI Intolerance    Acetaminophen Other (See Comments)     GI upset  GI upset         Objective   Objective     Physical Exam:  Vital Signs:   Vitals:    10/30/23 0903   BP: 110/76   BP Location: Left arm   Patient Position: Sitting   Cuff Size: Adult   Weight: 115 kg (254 lb 6.4 oz)   Height: 182.9 cm (72\")     Body mass index is 34.5 kg/m².     Physical Exam  Nursing note reviewed  Const: NAD, A&Ox4, Pleasant, Cooperative  Eyes: EOMI, no conjunctivitis  ENT: No nasal discharge present, neck supple  Cardiac: Regular rate and rhythm, no cyanosis  Resp: Respiratory rate within normal limits, no increased work of breathing, no audible wheezing or retractions noted  GI: No distention or ascites  MSK: Motor and sensation grossly intact in bilateral upper extremities  Neurologic: CN II-XII grossly intact  Psych: Appropriate mood and behavior.  Skin: Warm, dry  Procedures/Radiology     Procedures  No radiology results for the last 7 days     Assessment & Plan   Assessment / Plan      Assessment/Plan:   Problems Addressed This Visit  Diagnoses and all orders for this visit:    1. Type 2 diabetes mellitus without complication, without long-term current use of insulin (Primary)  -     POC Glycosylated Hemoglobin (Hb A1C)  -  " "   Comprehensive Metabolic Panel; Future  -     Comprehensive Metabolic Panel    2. Primary hypertension  -     lisinopril (PRINIVIL,ZESTRIL) 10 MG tablet; Take 1 tablet by mouth 2 (Two) Times a Week.  -     CBC & Differential; Future  -     CBC & Differential  -     Cancel: Manual Differential    3. Coronary artery disease involving native coronary artery of native heart without angina pectoris  -     High Sensitivity CRP; Future  -     CBC & Differential; Future  -     Comprehensive Metabolic Panel; Future  -     High Sensitivity CRP  -     CBC & Differential  -     Comprehensive Metabolic Panel  -     Cancel: Manual Differential    4. Dyslipidemia, goal LDL below 70  -     Lipid Panel With Apolipoprotein B (ApoB), GlycA, (DRI); Future  -     Comprehensive Metabolic Panel; Future  -     Lipoprotein A (LPA); Future  -     Lipid Panel With Apolipoprotein B (ApoB), GlycA, (DRI)  -     Comprehensive Metabolic Panel  -     Lipoprotein A (LPA)    Other orders  -     pitavastatin calcium (LIVALO) 2 MG tablet tablet; Take 1 tablet by mouth Every Night.  Dispense: 90 tablet; Refill: 0      Problem List Items Addressed This Visit          Cardiac and Vasculature    HTN (hypertension) (Chronic)    Overview     Lisinopril 10mg         Relevant Medications    lisinopril (PRINIVIL,ZESTRIL) 10 MG tablet    Other Relevant Orders    CBC & Differential (Completed)    Dyslipidemia, goal LDL below 70 (Chronic)    Overview     He reported side effects of abdominal pain from the rosuvastatin 10 mg, and declines any statin going forward, \"even if it leads to a cardiac event.\"         Relevant Medications    pitavastatin calcium (LIVALO) 2 MG tablet tablet    Other Relevant Orders    Lipid Panel With Apolipoprotein B (ApoB), GlycA, (DRI) (Completed)    Comprehensive Metabolic Panel (Completed)    Lipoprotein A (LPA) (Completed)    Coronary artery disease involving native heart without angina pectoris    Overview     CACS- .6, " total 161.7  Rosuvastatin 10mg         Relevant Orders    High Sensitivity CRP (Completed)    CBC & Differential (Completed)    Comprehensive Metabolic Panel (Completed)       Endocrine and Metabolic    Type 2 diabetes mellitus, without long-term current use of insulin - Primary (Chronic)    Relevant Orders    POC Glycosylated Hemoglobin (Hb A1C) (Completed)    Comprehensive Metabolic Panel (Completed)       There are no Patient Instructions on file for this visit.    Follow Up:   Return in about 3 months (around 1/30/2024) for with A1c;.      DO SALUD Sánchez RD  [facility]  [address]  [facility] [address]  Fax [phone]  Phone [phone]

## 2023-10-31 LAB — LPA SERPL-SCNC: 143.5 NMOL/L

## 2023-11-03 LAB
APO B SERPL-MCNC: 81 MG/DL
CHOLEST SERPL-MCNC: 183 MG/DL (ref 100–199)
DIABETES RISK SCORE CALC: 58 (ref 0–49)
GLYCA SERPL-SCNC: 352 UMOL/L
HDLC SERPL-MCNC: 59 MG/DL
LDLC SERPL CALC-MCNC: 96 MG/DL (ref 0–99)
Lab: ABNORMAL
NONHDLC SERPL-MCNC: 124 MG/DL (ref 0–129)
TRIGL SERPL-MCNC: 165 MG/DL (ref 0–149)

## 2024-01-05 ENCOUNTER — PRIOR AUTHORIZATION (OUTPATIENT)
Dept: FAMILY MEDICINE CLINIC | Facility: CLINIC | Age: 59
End: 2024-01-05
Payer: COMMERCIAL

## 2024-01-05 NOTE — TELEPHONE ENCOUNTER
Key: [id number]    Ozempic (0.25 or 0.5 MG/DOSE) 2MG/3ML pen-injectors    Information regarding your request  This request has been approved using information available on the patient's profile. CaseId:[id number];Status:Approved;Review Type:Prior Auth;Coverage Start Date:12/06/2023;Coverage End Date:01/04/2025;

## 2024-01-08 ENCOUNTER — OFFICE VISIT (OUTPATIENT)
Dept: FAMILY MEDICINE CLINIC | Facility: CLINIC | Age: 59
End: 2024-01-08
Payer: COMMERCIAL

## 2024-01-08 VITALS
BODY MASS INDEX: 34.86 KG/M2 | WEIGHT: 257.4 LBS | DIASTOLIC BLOOD PRESSURE: 76 MMHG | HEIGHT: 72 IN | SYSTOLIC BLOOD PRESSURE: 118 MMHG

## 2024-01-08 DIAGNOSIS — E11.9 TYPE 2 DIABETES MELLITUS WITHOUT COMPLICATION, WITHOUT LONG-TERM CURRENT USE OF INSULIN: Primary | ICD-10-CM

## 2024-01-08 LAB
EXPIRATION DATE: ABNORMAL
HBA1C MFR BLD: 5.9 % (ref 4.5–5.7)
Lab: ABNORMAL

## 2024-01-08 RX ORDER — SEMAGLUTIDE 1.34 MG/ML
1 INJECTION, SOLUTION SUBCUTANEOUS WEEKLY
Qty: 9 ML | Refills: 3 | Status: SHIPPED | OUTPATIENT
Start: 2024-01-08

## 2024-01-29 NOTE — PROGRESS NOTES
Established Patient Office Visit      Patient Name: Tripp Lantigua  : 1965   MRN: [id number]   Care Team: Patient Care Team:  Venu Bermudez DO as PCP - General (Family Medicine)  Wallace Adhikari PA as Physician Assistant (Cardiology)    Chief Complaint:    Chief Complaint   Patient presents with    Diabetes     3 month follow-up a1c       History of Present Illness: Tripp Lantigua is a 58 y.o. male who is here today for chief complaint.    HPI    Routine follow up.    This patient is accompanied by their self who contributes to the history of their care.    The following portions of the patient's history were reviewed and updated as appropriate: allergies, current medications, past family history, past medical history, past social history, past surgical history and problem list.    Subjective      Review of Systems:   Review of Systems - See HPI    Past Medical History:   Past Medical History:   Diagnosis Date    Allergic     Arthritis     Blood glucose elevated     Chronic bronchitis     Colitis     Colon polyp     Diverticulitis     Diverticulosis     GERD (gastroesophageal reflux disease)     Hypertension     IBS (irritable bowel syndrome)     Kidney stone        Past Surgical History:   Past Surgical History:   Procedure Laterality Date    COLON RESECTION N/A 8/15/2018    Procedure: OPEN LOW ANTERIOR RESECTION WITH TAKEDOWN OF COLOVESICAL FISTULA, MOBILIZATION OF SPLENIC FLEXIURE, APPENDECTOMY, AND ILEOSTOMY;  Surgeon: Daniel Cline MD;  Location:  [facility] OR;  Service: General    COLONOSCOPY      CYSTOSCOPY Bilateral 8/15/2018    Procedure: CYSTOSCOPY WITH BILATERAL URETERAL STENT PLACEMENT;  Surgeon: Daniel Cline MD;  Location:  [facility] OR;  Service: General    JOINT REPLACEMENT Bilateral     hip       Family History:   Family History   Problem Relation Age of Onset    Diverticulosis Mother     Cancer Father     Diverticulosis Father     Cancer Maternal Grandfather        Social  History:   Social History     Socioeconomic History    Marital status:    Tobacco Use    Smoking status: Never    Smokeless tobacco: Never   Vaping Use    Vaping Use: Never used   Substance and Sexual Activity    Alcohol use: Yes     Alcohol/week: 16.0 standard drinks of alcohol     Types: 16 Shots of liquor per week     Comment: Average 14 drinks a week    Drug use: No    Sexual activity: Yes     Partners: Female       Tobacco History:   Social History     Tobacco Use   Smoking Status Never   Smokeless Tobacco Never       Medications:     Current Outpatient Medications:     aspirin 81 MG chewable tablet, Chew 1 tablet Daily., Disp: 90 tablet, Rfl: 3    B Complex Vitamins (VITAMIN B COMPLEX PO), Take  by mouth., Disp: , Rfl:     BREO ELLIPTA 200-25 MCG/INH inhaler, As Needed., Disp: , Rfl:     Coenzyme Q10 (CoQ10) 100 MG capsule, Take 1 capsule by mouth Daily. Take with statin., Disp: 90 each, Rfl: 3    fluticasone (FLONASE) 50 MCG/ACT nasal spray, 2 sprays into the nostril(s) as directed by provider Daily., Disp: 16 g, Rfl: 11    Glucosamine-Chondroit-Vit C-Mn (GLUCOSAMINE 1500 COMPLEX) capsule, Take 1,500 mg by mouth Daily., Disp: , Rfl:     ibuprofen (ADVIL,MOTRIN) 200 MG tablet, Take 1 tablet by mouth Every 6 (Six) Hours As Needed for Mild Pain., Disp: , Rfl:     Krill Oil 1000 MG capsule, Take  by mouth., Disp: , Rfl:     L-GLUTAMINE PO, Take  by mouth., Disp: , Rfl:     lisinopril (PRINIVIL,ZESTRIL) 10 MG tablet, Take 1 tablet by mouth 2 (Two) Times a Week., Disp: , Rfl:     Melatonin 10 MG tablet, Take  by mouth As Needed., Disp: , Rfl:     Milk Thistle 1000 MG capsule, Take  by mouth., Disp: , Rfl:     Multiple Vitamins-Minerals (MULTIVITAMIN WITH MINERALS) tablet tablet, Take 1 tablet by mouth Daily., Disp: , Rfl:     pitavastatin calcium (LIVALO) 2 MG tablet tablet, Take 1 tablet by mouth Every Night., Disp: 90 tablet, Rfl: 0    tadalafil (CIALIS) 5 MG tablet, Take 1 tablet by mouth Daily As Needed  "for Erectile Dysfunction., Disp: , Rfl:     testosterone (ANDROGEL) 50 MG/5GM (1%) gel gel, Place 50 mg on the skin as directed by provider Daily., Disp: 30 each, Rfl: 2    vitamin C (ASCORBIC ACID) 500 MG tablet, Take 1 tablet by mouth Daily., Disp: , Rfl:     Ozempic, 1 MG/DOSE, 4 MG/3ML solution pen-injector, Inject 1 mg under the skin into the appropriate area as directed 1 (One) Time Per Week., Disp: 9 mL, Rfl: 3    Allergies:   Allergies   Allergen Reactions    Metformin GI Intolerance    Acetaminophen Other (See Comments)     GI upset  GI upset         Objective   Objective     Physical Exam:  Vital Signs:   Vitals:    01/08/24 1515   BP: 118/76   BP Location: Left arm   Patient Position: Sitting   Cuff Size: Adult   Weight: 117 kg (257 lb 6.4 oz)   Height: 182.9 cm (72\")     Body mass index is 34.91 kg/m².     Physical Exam  Nursing note reviewed  Const: NAD, A&Ox4, Pleasant, Cooperative  Eyes: EOMI, no conjunctivitis  ENT: No nasal discharge present, neck supple  Cardiac: Regular rate and rhythm, no cyanosis  Resp: Respiratory rate within normal limits, no increased work of breathing, no audible wheezing or retractions noted  GI: No distention or ascites  MSK: Motor and sensation grossly intact in bilateral upper extremities  Neurologic: CN II-XII grossly intact  Psych: Appropriate mood and behavior.  Skin: Warm, dry  Procedures/Radiology     Procedures  No radiology results for the last 7 days     Assessment & Plan   Assessment / Plan      Assessment/Plan:   Problems Addressed This Visit  Diagnoses and all orders for this visit:    1. Type 2 diabetes mellitus without complication, without long-term current use of insulin (Primary)  -     POC Glycosylated Hemoglobin (Hb A1C)  -     Ozempic, 1 MG/DOSE, 4 MG/3ML solution pen-injector; Inject 1 mg under the skin into the appropriate area as directed 1 (One) Time Per Week.  Dispense: 9 mL; Refill: 3      Problem List Items Addressed This Visit          " Endocrine and Metabolic    Type 2 diabetes mellitus, without long-term current use of insulin - Primary (Chronic)    Relevant Medications    Ozempic, 1 MG/DOSE, 4 MG/3ML solution pen-injector    Other Relevant Orders    POC Glycosylated Hemoglobin (Hb A1C) (Completed)         Patient Instructions   A1c today 5.0%, down from 6.0% 3 months ago. Doing great!    Follow Up:   Return in about 3 months (around 4/8/2024) for with A1c, microalbumin.        DO SALUD Sánchez RD  [facility]  [address]  [facility] [address]  Fax [phone]  Phone [phone]

## 2024-04-12 ENCOUNTER — OFFICE VISIT (OUTPATIENT)
Dept: FAMILY MEDICINE CLINIC | Facility: CLINIC | Age: 59
End: 2024-04-12
Payer: COMMERCIAL

## 2024-04-12 VITALS
DIASTOLIC BLOOD PRESSURE: 70 MMHG | HEIGHT: 72 IN | WEIGHT: 257 LBS | SYSTOLIC BLOOD PRESSURE: 110 MMHG | BODY MASS INDEX: 34.81 KG/M2

## 2024-04-12 DIAGNOSIS — E55.9 VITAMIN D DEFICIENCY: ICD-10-CM

## 2024-04-12 DIAGNOSIS — E78.5 DYSLIPIDEMIA, GOAL LDL BELOW 70: Chronic | ICD-10-CM

## 2024-04-12 DIAGNOSIS — E11.9 TYPE 2 DIABETES MELLITUS WITHOUT COMPLICATION, WITHOUT LONG-TERM CURRENT USE OF INSULIN: Primary | ICD-10-CM

## 2024-04-12 DIAGNOSIS — E29.1 HYPOGONADISM IN MALE: ICD-10-CM

## 2024-04-12 LAB
EXPIRATION DATE: ABNORMAL
EXPIRATION DATE: NORMAL
HBA1C MFR BLD: 6.2 % (ref 4.5–5.7)
Lab: ABNORMAL
Lab: NORMAL
POC CREATININE URINE: 50
POC MICROALBUMIN URINE: 10

## 2024-04-12 NOTE — PROGRESS NOTES
Subjective   Tripp Lantigua is a 58 y.o. male.     Chief Complaint   Patient presents with    Diabetes     3 month follow-up a1c       History of Present Illness     Tripp Lantigua presents today for   Chief Complaint   Patient presents with    Diabetes     3 month follow-up a1c     he is in need of chronic disease followup. he denies acute complaints today.  He reports he is actually feeling quite good, feels like the Ozempic is helping and denies any side effects.  He admits he has fallen off his diet in the last couple of weeks, and has not really been exercising much, but plans to start healthier habits over the coming months.    This patient is accompanied by their self who contributes to the history of their care.    The following portions of the patient's history were reviewed and updated as appropriate: allergies, current medications, past family history, past medical history, past social history, past surgical history and problem list.    Current Outpatient Medications   Medication Instructions    aspirin 81 mg, Oral, Daily    B Complex Vitamins (VITAMIN B COMPLEX PO) Oral    BREO ELLIPTA 200-25 MCG/INH inhaler As Needed    CoQ10 100 mg, Oral, Daily, Take with statin.    fluticasone (FLONASE) 50 MCG/ACT nasal spray 2 sprays, Nasal, Daily    Glucosamine-Chondroit-Vit C-Mn (GLUCOSAMINE 1500 COMPLEX) capsule 1 capsule, Oral, Daily    ibuprofen (ADVIL,MOTRIN) 200 mg, Oral, Every 6 Hours PRN    Krill Oil 1000 MG capsule Oral    L-GLUTAMINE PO Oral    lisinopril (PRINIVIL,ZESTRIL) 10 mg, Oral, 2 Times Weekly    Melatonin 10 MG tablet Oral, As Needed    Milk Thistle 1000 MG capsule Oral    Multiple Vitamins-Minerals (MULTIVITAMIN WITH MINERALS) tablet tablet 1 tablet, Oral, Daily    Ozempic (1 MG/DOSE) 1 mg, Subcutaneous, Weekly    pitavastatin calcium (LIVALO) 2 mg, Oral, Nightly    tadalafil (CIALIS) 5 mg, Oral, Daily PRN    testosterone (ANDROGEL) 50 mg, Transdermal, Daily    vitamin C (ASCORBIC ACID) 500 mg,  Oral, Daily     Active Ambulatory Problems     Diagnosis Date Noted    Diverticulitis 08/15/2018    S/P LAR with loop ileostomy 08/15/2018    HTN (hypertension) 08/15/2018    Type 2 diabetes mellitus, without long-term current use of insulin 08/15/2018    Vitamin D deficiency 05/13/2022    Dyslipidemia, goal LDL below 70 05/13/2022    Coronary artery disease involving native heart without angina pectoris 07/19/2022     Resolved Ambulatory Problems     Diagnosis Date Noted    Acute blood loss anemia, mild 08/15/2018    Leukocytosis, likely reactive 08/15/2018     Past Medical History:   Diagnosis Date    Allergic 1996    Arthritis     Blood glucose elevated     Chronic bronchitis     Colitis     Colon polyp     Diverticulosis     GERD (gastroesophageal reflux disease)     Hypertension     IBS (irritable bowel syndrome)     Kidney stone      Past Surgical History:   Procedure Laterality Date    COLON RESECTION N/A 8/15/2018    Procedure: OPEN LOW ANTERIOR RESECTION WITH TAKEDOWN OF COLOVESICAL FISTULA, MOBILIZATION OF SPLENIC FLEXIURE, APPENDECTOMY, AND ILEOSTOMY;  Surgeon: Daniel Cline MD;  Location:  [facility] OR;  Service: General    COLONOSCOPY      CYSTOSCOPY Bilateral 8/15/2018    Procedure: CYSTOSCOPY WITH BILATERAL URETERAL STENT PLACEMENT;  Surgeon: Daniel Cline MD;  Location:  [facility] OR;  Service: General    JOINT REPLACEMENT Bilateral     hip     Family History   Problem Relation Age of Onset    Diverticulosis Mother     Cancer Father     Diverticulosis Father     Cancer Maternal Grandfather      Social History     Socioeconomic History    Marital status:    Tobacco Use    Smoking status: Never    Smokeless tobacco: Never   Vaping Use    Vaping status: Never Used   Substance and Sexual Activity    Alcohol use: Yes     Alcohol/week: 16.0 standard drinks of alcohol     Types: 16 Shots of liquor per week     Comment: Average 14 drinks a week    Drug use: No    Sexual activity: Yes     Partners: Female  "      Review of Systems  Review of Systems -  General ROS: negative for - chills, fever or night sweats  Cardiovascular ROS: no chest pain or dyspnea on exertion  Gastrointestinal ROS: no abdominal pain, change in bowel habits, or black or bloody stools  Genito-Urinary ROS: no trouble voiding or gross hematuria    Objective   Blood pressure 110/70, height 182.9 cm (72\"), weight 117 kg (257 lb).  Nursing note reviewed  Physical Exam  Const: NAD, A&Ox4, Pleasant, Cooperative  Eyes: EOMI, no conjunctivitis  ENT: No nasal discharge present, neck supple  Cardiac: Regular rate and rhythm, no cyanosis  Resp: Respiratory rate within normal limits, no increased work of breathing, no audible wheezing or retractions noted  GI: No distention or ascites  Procedures  Assessment & Plan     Problem List Items Addressed This Visit          Cardiac and Vasculature    Dyslipidemia, goal LDL below 70 (Chronic)    Overview     He reported side effects of abdominal pain from the rosuvastatin 10 mg, and declines any statin going forward, \"even if it leads to a cardiac event.\"         Current Assessment & Plan     Has not started taking the pitavastatin yet. Recheck with apoB today, recommend starting.         Relevant Orders    Lipid Panel    Comprehensive Metabolic Panel    Apo A1 B & Ratio       Endocrine and Metabolic    Type 2 diabetes mellitus, without long-term current use of insulin - Primary (Chronic)    Current Assessment & Plan     A1c stable, continue Ozempic 1mg weekly         Relevant Orders    POC Glycosylated Hemoglobin (Hb A1C) (Completed)    POCT microalbumin (Completed)    Vitamin D deficiency    Relevant Orders    Vitamin D,25-Hydroxy     Other Visit Diagnoses       Hypogonadism in male        Relevant Orders    Testosterone          See patient diagnoses and orders along with patient instructions for assessment, plan, and changes to care for patient.    Patient Instructions   Start exercising regularly    Return in " about 3 months (around 7/12/2024) for with A1c;.    Ambulatory progress note signed and attested to by Venu Bermudez D.O.

## 2024-05-10 DIAGNOSIS — I25.10 CORONARY ARTERY DISEASE INVOLVING NATIVE CORONARY ARTERY OF NATIVE HEART WITHOUT ANGINA PECTORIS: Primary | ICD-10-CM

## 2024-06-21 ENCOUNTER — LAB (OUTPATIENT)
Dept: LAB | Facility: HOSPITAL | Age: 59
End: 2024-06-21
Payer: COMMERCIAL

## 2024-06-21 DIAGNOSIS — E78.5 DYSLIPIDEMIA, GOAL LDL BELOW 70: Chronic | ICD-10-CM

## 2024-06-21 DIAGNOSIS — E29.1 HYPOGONADISM IN MALE: ICD-10-CM

## 2024-06-21 DIAGNOSIS — E55.9 VITAMIN D DEFICIENCY: ICD-10-CM

## 2024-06-21 LAB
25(OH)D3 SERPL-MCNC: 31.3 NG/ML (ref 30–100)
ALBUMIN SERPL-MCNC: 4.3 G/DL (ref 3.5–5.2)
ALBUMIN/GLOB SERPL: 2 G/DL
ALP SERPL-CCNC: 53 U/L (ref 39–117)
ALT SERPL W P-5'-P-CCNC: 21 U/L (ref 1–41)
ANION GAP SERPL CALCULATED.3IONS-SCNC: 11.2 MMOL/L (ref 5–15)
AST SERPL-CCNC: 18 U/L (ref 1–40)
BILIRUB SERPL-MCNC: 0.9 MG/DL (ref 0–1.2)
BUN SERPL-MCNC: 17 MG/DL (ref 8–23)
BUN/CREAT SERPL: 19.5 (ref 7–25)
CALCIUM SPEC-SCNC: 9.1 MG/DL (ref 8.6–10.5)
CHLORIDE SERPL-SCNC: 107 MMOL/L (ref 98–107)
CHOLEST SERPL-MCNC: 179 MG/DL (ref 0–200)
CO2 SERPL-SCNC: 22.8 MMOL/L (ref 22–29)
CREAT SERPL-MCNC: 0.87 MG/DL (ref 0.76–1.27)
EGFRCR SERPLBLD CKD-EPI 2021: 98.2 ML/MIN/1.73
GLOBULIN UR ELPH-MCNC: 2.1 GM/DL
GLUCOSE SERPL-MCNC: 135 MG/DL (ref 65–99)
HDLC SERPL-MCNC: 57 MG/DL (ref 40–60)
LDLC SERPL CALC-MCNC: 102 MG/DL (ref 0–100)
LDLC/HDLC SERPL: 1.74 {RATIO}
POTASSIUM SERPL-SCNC: 4.3 MMOL/L (ref 3.5–5.2)
PROT SERPL-MCNC: 6.4 G/DL (ref 6–8.5)
SODIUM SERPL-SCNC: 141 MMOL/L (ref 136–145)
TESTOST SERPL-MCNC: 300 NG/DL (ref 193–740)
TRIGL SERPL-MCNC: 115 MG/DL (ref 0–150)
VLDLC SERPL-MCNC: 20 MG/DL (ref 5–40)

## 2024-06-21 PROCEDURE — 80053 COMPREHEN METABOLIC PANEL: CPT

## 2024-06-21 PROCEDURE — 82306 VITAMIN D 25 HYDROXY: CPT

## 2024-06-21 PROCEDURE — 80061 LIPID PANEL: CPT

## 2024-06-21 PROCEDURE — 82172 ASSAY OF APOLIPOPROTEIN: CPT

## 2024-06-21 PROCEDURE — 84403 ASSAY OF TOTAL TESTOSTERONE: CPT

## 2024-06-23 LAB
APO A-I SERPL-MCNC: 120 MG/DL (ref 101–178)
APO B SERPL-MCNC: 92 MG/DL
APO B/APO A-I SERPL: 0.8 RATIO (ref 0–0.7)

## 2024-06-26 ENCOUNTER — OFFICE VISIT (OUTPATIENT)
Dept: FAMILY MEDICINE CLINIC | Facility: CLINIC | Age: 59
End: 2024-06-26
Payer: COMMERCIAL

## 2024-06-26 VITALS
WEIGHT: 255.2 LBS | HEIGHT: 72 IN | OXYGEN SATURATION: 98 % | RESPIRATION RATE: 16 BRPM | DIASTOLIC BLOOD PRESSURE: 86 MMHG | HEART RATE: 74 BPM | SYSTOLIC BLOOD PRESSURE: 128 MMHG | BODY MASS INDEX: 34.57 KG/M2

## 2024-06-26 DIAGNOSIS — E78.5 DYSLIPIDEMIA, GOAL LDL BELOW 70: Primary | ICD-10-CM

## 2024-06-26 DIAGNOSIS — E11.9 TYPE 2 DIABETES MELLITUS WITHOUT COMPLICATION, WITHOUT LONG-TERM CURRENT USE OF INSULIN: Chronic | ICD-10-CM

## 2024-06-26 DIAGNOSIS — I25.10 CORONARY ARTERY DISEASE INVOLVING NATIVE CORONARY ARTERY OF NATIVE HEART WITHOUT ANGINA PECTORIS: ICD-10-CM

## 2024-06-26 PROCEDURE — 99214 OFFICE O/P EST MOD 30 MIN: CPT | Performed by: FAMILY MEDICINE

## 2024-06-26 RX ORDER — PITAVASTATIN CALCIUM 2.09 MG/1
2 TABLET, FILM COATED ORAL NIGHTLY
Qty: 90 TABLET | Refills: 1 | Status: SHIPPED | OUTPATIENT
Start: 2024-06-26

## 2024-06-26 RX ORDER — PITAVASTATIN CALCIUM 2.09 MG/1
2 TABLET, FILM COATED ORAL NIGHTLY
Qty: 90 TABLET | Refills: 1 | Status: SHIPPED | OUTPATIENT
Start: 2024-06-26 | End: 2024-06-26 | Stop reason: SDUPTHER

## 2024-06-26 NOTE — ASSESSMENT & PLAN NOTE
Lab Results   Component Value Date    CHOL 179 06/21/2024    CHLPL 183 10/30/2023    TRIG 115 06/21/2024    HDL 57 06/21/2024     (H) 06/21/2024   ApoB 92  Start pitavastatin 2mg daily  Recheck FLP and apoB 3 months

## 2024-06-26 NOTE — PROGRESS NOTES
Established Patient Office Visit      Patient Name: Tripp Lantigua  : 1965   MRN: [id number]   Care Team: Patient Care Team:  Venu Bermudez DO as PCP - General (Family Medicine)  Wallace Adhikari PA as Physician Assistant (Cardiology)    Chief Complaint:    Chief Complaint   Patient presents with    Hypertension     Wants to discuss lab results.        History of Present Illness: Tripp Lantigua is a 59 y.o. male who is here today for chief complaint.    HPI    Follow-up on lab results for hyperlipidemia.    This patient is accompanied by their self who contributes to the history of their care.    The following portions of the patient's history were reviewed and updated as appropriate: allergies, current medications, past family history, past medical history, past social history, past surgical history and problem list.    Subjective      Review of Systems:   Review of Systems - See HPI    Past Medical History:   Past Medical History:   Diagnosis Date    Allergic     Arthritis     Blood glucose elevated     Chronic bronchitis     Colitis     Colon polyp     Diverticulitis     Diverticulosis     GERD (gastroesophageal reflux disease)     Hypertension     IBS (irritable bowel syndrome)     Kidney stone        Past Surgical History:   Past Surgical History:   Procedure Laterality Date    COLON RESECTION N/A 8/15/2018    Procedure: OPEN LOW ANTERIOR RESECTION WITH TAKEDOWN OF COLOVESICAL FISTULA, MOBILIZATION OF SPLENIC FLEXIURE, APPENDECTOMY, AND ILEOSTOMY;  Surgeon: Daniel Cline MD;  Location: [facility] OR;  Service: General    COLONOSCOPY      CYSTOSCOPY Bilateral 8/15/2018    Procedure: CYSTOSCOPY WITH BILATERAL URETERAL STENT PLACEMENT;  Surgeon: Daniel Cline MD;  Location: [facility] OR;  Service: General    JOINT REPLACEMENT Bilateral     hip       Family History:   Family History   Problem Relation Age of Onset    Diverticulosis Mother     Cancer Father     Diverticulosis Father     Cancer  Maternal Grandfather        Social History:   Social History     Socioeconomic History    Marital status:    Tobacco Use    Smoking status: Never    Smokeless tobacco: Never   Vaping Use    Vaping status: Never Used   Substance and Sexual Activity    Alcohol use: Yes     Alcohol/week: 16.0 standard drinks of alcohol     Types: 16 Shots of liquor per week     Comment: Average 14 drinks a week    Drug use: No    Sexual activity: Yes     Partners: Female       Tobacco History:   Social History     Tobacco Use   Smoking Status Never   Smokeless Tobacco Never       Medications:     Current Outpatient Medications:     aspirin 81 MG chewable tablet, Chew 1 tablet Daily., Disp: 90 tablet, Rfl: 3    B Complex Vitamins (VITAMIN B COMPLEX PO), Take  by mouth., Disp: , Rfl:     BREO ELLIPTA 200-25 MCG/INH inhaler, As Needed., Disp: , Rfl:     Coenzyme Q10 (CoQ10) 100 MG capsule, Take 1 capsule by mouth Daily. Take with statin., Disp: 90 each, Rfl: 3    fluticasone (FLONASE) 50 MCG/ACT nasal spray, 2 sprays into the nostril(s) as directed by provider Daily., Disp: 16 g, Rfl: 11    Glucosamine-Chondroit-Vit C-Mn (GLUCOSAMINE 1500 COMPLEX) capsule, Take 1,500 mg by mouth Daily., Disp: , Rfl:     ibuprofen (ADVIL,MOTRIN) 200 MG tablet, Take 1 tablet by mouth Every 6 (Six) Hours As Needed for Mild Pain., Disp: , Rfl:     Krill Oil 1000 MG capsule, Take  by mouth., Disp: , Rfl:     L-GLUTAMINE PO, Take  by mouth., Disp: , Rfl:     lisinopril (PRINIVIL,ZESTRIL) 10 MG tablet, Take 1 tablet by mouth 2 (Two) Times a Week., Disp: , Rfl:     Melatonin 10 MG tablet, Take  by mouth As Needed., Disp: , Rfl:     Milk Thistle 1000 MG capsule, Take  by mouth., Disp: , Rfl:     Multiple Vitamins-Minerals (MULTIVITAMIN WITH MINERALS) tablet tablet, Take 1 tablet by mouth Daily., Disp: , Rfl:     Ozempic, 1 MG/DOSE, 4 MG/3ML solution pen-injector, Inject 1 mg under the skin into the appropriate area as directed 1 (One) Time Per Week., Disp:  "9 mL, Rfl: 3    pitavastatin calcium (LIVALO) 2 MG tablet tablet, Take 1 tablet by mouth Every Night., Disp: 90 tablet, Rfl: 1    tadalafil (CIALIS) 5 MG tablet, Take 1 tablet by mouth Daily As Needed for Erectile Dysfunction., Disp: , Rfl:     testosterone (ANDROGEL) 50 MG/5GM (1%) gel gel, Place 50 mg on the skin as directed by provider Daily., Disp: 30 each, Rfl: 2    vitamin C (ASCORBIC ACID) 500 MG tablet, Take 1 tablet by mouth Daily., Disp: , Rfl:     Allergies:   Allergies   Allergen Reactions    Metformin GI Intolerance    Acetaminophen Other (See Comments)     GI upset  GI upset         Objective   Objective     Physical Exam:  Vital Signs:   Vitals:    06/26/24 1224   BP: 128/86   Pulse: 74   Resp: 16   SpO2: 98%   Weight: 116 kg (255 lb 3.2 oz)   Height: 182.9 cm (72\")  Comment: Reported by patient     Body mass index is 34.61 kg/m².     Physical Exam  Nursing note reviewed  Const: NAD, A&Ox4, Pleasant, Cooperative  Eyes: EOMI, no conjunctivitis  ENT: No nasal discharge present, neck supple  Cardiac: Regular rate and rhythm, no cyanosis  Resp: Respiratory rate within normal limits, no increased work of breathing, no audible wheezing or retractions noted  GI: No distention or ascites  MSK: Motor and sensation grossly intact in bilateral upper extremities  Neurologic: CN II-XII grossly intact  Psych: Appropriate mood and behavior.  Skin: Warm, dry  Procedures/Radiology     Procedures  No radiology results for the last 7 days     Assessment & Plan   Assessment / Plan      Assessment/Plan:   Problems Addressed This Visit  Diagnoses and all orders for this visit:    1. Dyslipidemia, goal LDL below 70 (Primary)  Assessment & Plan:  Lab Results   Component Value Date    CHOL 179 06/21/2024    CHLPL 183 10/30/2023    TRIG 115 06/21/2024    HDL 57 06/21/2024     (H) 06/21/2024   ApoB 92  Start pitavastatin 2mg daily  Recheck FLP and apoB 3 months    Orders:  -     Lipid Panel With Apolipoprotein B (ApoB), " "GlycA, (DRI); Future  -     Comprehensive Metabolic Panel; Future  -     pitavastatin calcium (LIVALO) 2 MG tablet tablet; Take 1 tablet by mouth Every Night.  Dispense: 90 tablet; Refill: 1    2. Coronary artery disease involving native coronary artery of native heart without angina pectoris  Assessment & Plan:  He will start pitavastatin 2mg, recheck apo B and FLP in 3 months    Orders:  -     Lipid Panel With Apolipoprotein B (ApoB), GlycA, (DRI); Future    3. Type 2 diabetes mellitus without complication, without long-term current use of insulin  -     Hemoglobin A1c; Future  -     Comprehensive Metabolic Panel; Future    Other orders  -     Discontinue: pitavastatin calcium (LIVALO) 2 MG tablet tablet; Take 1 tablet by mouth Every Night.  Dispense: 90 tablet; Refill: 1      Problem List Items Addressed This Visit          Cardiac and Vasculature    Dyslipidemia, goal LDL below 70 - Primary (Chronic)    Overview     He reported side effects of abdominal pain from the rosuvastatin 10 mg, and previously declined any statin going forward, \"even if it leads to a cardiac event.\" He is now open to trying pitavastatin 2mg, will start June 2024.         Current Assessment & Plan     Lab Results   Component Value Date    CHOL 179 06/21/2024    CHLPL 183 10/30/2023    TRIG 115 06/21/2024    HDL 57 06/21/2024     (H) 06/21/2024   ApoB 92  Start pitavastatin 2mg daily  Recheck FLP and apoB 3 months         Relevant Medications    pitavastatin calcium (LIVALO) 2 MG tablet tablet    Other Relevant Orders    Lipid Panel With Apolipoprotein B (ApoB), GlycA, (DRI)    Comprehensive Metabolic Panel    Coronary artery disease involving native heart without angina pectoris    Overview     CACS- .6, total 161.7  Rosuvastatin 10mg not tolerated  -Trial of pitavastatin 2mg daily         Current Assessment & Plan     He will start pitavastatin 2mg, recheck apo B and FLP in 3 months         Relevant Orders    Lipid Panel " With Apolipoprotein B (ApoB), GlycA, (DRI)       Endocrine and Metabolic    Type 2 diabetes mellitus, without long-term current use of insulin (Chronic)    Relevant Orders    Hemoglobin A1c    Comprehensive Metabolic Panel         Patient Instructions   Coronary artery calcium scoring via CT scan can be obtained for $100 through [email or web address].  Currently these are being done at their [place] location at [address]  Their phone number is [phone].  When open, they are also done at their [place] location at [address]. [email or web address]  -This is an out-of-pocket cost and does not require an order or to be run through insurance.  It can be scheduled by patients.     Follow Up:   Return in about 3 months (around 9/26/2024) for (fasting blood work 1 week prior to appt).      DO SALUD Sánchez RD  [facility]  [address]  [facility] [address]  Fax [phone]  Phone [phone]

## 2024-06-26 NOTE — PATIENT INSTRUCTIONS
Coronary artery calcium scoring via CT scan can be obtained for $100 through [facility] [employer].  Currently these are being done at their [place] location at [address].  Their phone number is [phone].  When open, they are also done at their [place] location at [address]. [email or web address]  -This is an out-of-pocket cost and does not require an order or to be run through insurance.  It can be scheduled by patients.

## 2024-07-08 ENCOUNTER — OFFICE VISIT (OUTPATIENT)
Dept: CARDIOLOGY | Facility: CLINIC | Age: 59
End: 2024-07-08
Payer: COMMERCIAL

## 2024-07-08 VITALS
SYSTOLIC BLOOD PRESSURE: 112 MMHG | WEIGHT: 255.4 LBS | BODY MASS INDEX: 34.59 KG/M2 | HEART RATE: 76 BPM | OXYGEN SATURATION: 96 % | HEIGHT: 72 IN | DIASTOLIC BLOOD PRESSURE: 82 MMHG

## 2024-07-08 DIAGNOSIS — I25.10 CORONARY ARTERY DISEASE INVOLVING NATIVE CORONARY ARTERY OF NATIVE HEART WITHOUT ANGINA PECTORIS: Primary | ICD-10-CM

## 2024-07-08 DIAGNOSIS — I10 PRIMARY HYPERTENSION: Chronic | ICD-10-CM

## 2024-07-08 PROCEDURE — 99214 OFFICE O/P EST MOD 30 MIN: CPT | Performed by: PHYSICIAN ASSISTANT

## 2024-07-08 RX ORDER — ROSUVASTATIN CALCIUM 5 MG/1
5 TABLET, COATED ORAL EVERY OTHER DAY
COMMUNITY
End: 2024-07-08 | Stop reason: ALTCHOICE

## 2024-07-08 NOTE — PROGRESS NOTES
[facility]   OFFICE NOTE      Tripp Lantigua  1965  PCP: Venu Bermudez DO    SUBJECTIVE:   Tripp Lantigua is a 59 y.o. male seen for a follow up visit regarding the following:     CC:CAD    HPI:   Pleasant 59-year-old  for [facility] presents today for follow-up regarding coronary disease, hypertension, dyslipidemia.  Since last seen per office he continues to well with exercise he is not having symptoms of chest pain or short of breath suggesting angina pectoris.  Denies any heart failure symptoms.  He reports his blood pressure normally well-controlled.  He did switch from Crestor to Livalo as he states he had difficulty with Crestor.  Recent lipid panel shows LDL is 102.  He had elevated lipoprotein a as well.  Overall he feels he is doing well current medical regiment.  He declined stress test which was ordered on last visit as he feels he is able to do this at the gym with no symptoms..      Cardiac PMH: (Old records have been reviewed and summarized below)  CAD  Remote Negative stress test with [facility] 2013  Calcium score test +  Moderate plaque, Negative calcium in LAD, CX and Left Main. 6/14/2022  DM Type II, Last HbA1c 5.9  HLD: Currently on Crestor. LDL 90, Trig 238 7/2022  HTN: Controlled.   Obesity, BMI is 34.8  Testosterone deficiency-On Androgel , Last T 168   ED   Remote: Fistula repair 2018    Past Medical History, Past Surgical History, Family history, Social History, and Medications were all reviewed with the patient today and updated as necessary.       Current Outpatient Medications:     aspirin 81 MG chewable tablet, Chew 1 tablet Daily., Disp: 90 tablet, Rfl: 3    B Complex Vitamins (VITAMIN B COMPLEX PO), Take 1 capsule by mouth Daily., Disp: , Rfl:     BREO ELLIPTA 200-25 MCG/INH inhaler, Inhale 1 puff As Needed., Disp: , Rfl:     Coenzyme Q10 (CoQ10) 100 MG capsule, Take 1 capsule by mouth Daily. Take with  "statin., Disp: 90 each, Rfl: 3    fluticasone (FLONASE) 50 MCG/ACT nasal spray, 2 sprays into the nostril(s) as directed by provider Daily., Disp: 16 g, Rfl: 11    Glucosamine-Chondroit-Vit C-Mn (GLUCOSAMINE 1500 COMPLEX) capsule, Take 1,500 mg by mouth Daily., Disp: , Rfl:     ibuprofen (ADVIL,MOTRIN) 200 MG tablet, Take 1 tablet by mouth Every 6 (Six) Hours As Needed for Mild Pain., Disp: , Rfl:     Krill Oil 1000 MG capsule, Take 1 capsule by mouth Daily., Disp: , Rfl:     L-GLUTAMINE PO, Take 1 capsule by mouth Daily., Disp: , Rfl:     lisinopril (PRINIVIL,ZESTRIL) 10 MG tablet, Take 1 tablet by mouth 2 (Two) Times a Week., Disp: , Rfl:     Melatonin 10 MG tablet, Take  by mouth As Needed., Disp: , Rfl:     Milk Thistle 1000 MG capsule, Take 1 capsule by mouth Daily., Disp: , Rfl:     Multiple Vitamins-Minerals (MULTIVITAMIN WITH MINERALS) tablet tablet, Take 1 tablet by mouth Daily., Disp: , Rfl:     Ozempic, 1 MG/DOSE, 4 MG/3ML solution pen-injector, Inject 1 mg under the skin into the appropriate area as directed 1 (One) Time Per Week., Disp: 9 mL, Rfl: 3    pitavastatin calcium (LIVALO) 2 MG tablet tablet, Take 1 tablet by mouth Every Night., Disp: 90 tablet, Rfl: 1    rosuvastatin (CRESTOR) 5 MG tablet, Take 1 tablet by mouth Every Other Day., Disp: , Rfl:     tadalafil (CIALIS) 5 MG tablet, Take 1 tablet by mouth Daily As Needed for Erectile Dysfunction., Disp: , Rfl:     testosterone (ANDROGEL) 50 MG/5GM (1%) gel gel, Place 50 mg on the skin as directed by provider Daily., Disp: 30 each, Rfl: 2    vitamin C (ASCORBIC ACID) 500 MG tablet, Take 1 tablet by mouth Daily., Disp: , Rfl:       Allergies   Allergen Reactions    Metformin GI Intolerance    Acetaminophen Other (See Comments)     GI upset  GI upset           PHYSICAL EXAM:    /82 (BP Location: Right arm, Patient Position: Sitting)   Pulse 76   Ht 182.9 cm (72\")   Wt 116 kg (255 lb 6.4 oz)   SpO2 96%   BMI 34.64 kg/m²        Wt Readings " from Last 5 Encounters:   07/08/24 116 kg (255 lb 6.4 oz)   06/26/24 116 kg (255 lb 3.2 oz)   04/12/24 117 kg (257 lb)   01/08/24 117 kg (257 lb 6.4 oz)   10/30/23 115 kg (254 lb 6.4 oz)       BP Readings from Last 5 Encounters:   07/08/24 112/82   06/26/24 128/86   04/12/24 110/70   01/08/24 118/76   10/30/23 110/76       General appearance - Alert, well appearing, and in no distress   Mental status - Affect appropriate to mood.  Eyes - Sclerae anicteric,  ENMT - Hearing grossly normal bilaterally, Dental hygiene good.  Neck - Carotids upstroke normal bilaterally, no bruits, no JVD.  Resp - Clear to auscultation, no wheezes, rales or rhonchi, symmetric air entry.  Heart - Normal rate, regular rhythm, normal S1, S2, no murmurs, rubs, clicks or gallops.  GI - Soft, nontender, nondistended, no masses or organomegaly.  Neurological - Grossly intact - normal speech, no focal findings  Musculoskeletal - No joint tenderness, deformity or swelling, no muscular tenderness noted.  Extremities - Peripheral pulses normal, no pedal edema, no clubbing or cyanosis.  Skin - Normal coloration and turgor.  Psych -  oriented to person, place, and time.    Medical problems and test results were reviewed with the patient today.       ASSESSMENT   1. CAD: Abnormal Calcium score.  Patient denies any angina symptoms.  He declined stress test last year as he feels he is doing quite well with exercise at the gym.    2. HLD: Patient discontinued Crestor due to concern for side effects, he is now trying Livalo prescribed by his PCP.  His LDL last checked 2 weeks ago was 102.  He did have elevated lipoprotein a ratio.    3. DM Type II    4. HTN: Controlled o Zestril     PLAN  Continue to  focus on risk factors modification diet exercise weight loss, Livalo therapy for dyslipidemia.  Continue blood pressure control.  Return for follow-up in 6 months or sooner as needed.    7/8/2024  11:38 EDT   Electronically signed by DAMI Beckman,  07/08/24, 12:37 PM EDT.

## 2024-07-10 RX ORDER — ASPIRIN 81 MG/1
81 TABLET, CHEWABLE ORAL DAILY
Qty: 90 TABLET | Refills: 3 | Status: SHIPPED | OUTPATIENT
Start: 2024-07-10

## 2024-07-10 RX ORDER — ASPIRIN 81 MG/1
TABLET, CHEWABLE ORAL
Qty: 90 TABLET | Refills: 3 | OUTPATIENT
Start: 2024-07-10

## 2024-08-12 ENCOUNTER — OFFICE VISIT (OUTPATIENT)
Dept: FAMILY MEDICINE CLINIC | Facility: CLINIC | Age: 59
End: 2024-08-12
Payer: COMMERCIAL

## 2024-08-12 VITALS
SYSTOLIC BLOOD PRESSURE: 124 MMHG | WEIGHT: 253.8 LBS | BODY MASS INDEX: 34.38 KG/M2 | HEART RATE: 64 BPM | DIASTOLIC BLOOD PRESSURE: 78 MMHG | HEIGHT: 72 IN | OXYGEN SATURATION: 98 %

## 2024-08-12 DIAGNOSIS — Z23 IMMUNIZATION DUE: ICD-10-CM

## 2024-08-12 DIAGNOSIS — K22.0 ACHALASIA: Primary | ICD-10-CM

## 2024-08-12 DIAGNOSIS — F10.20 MODERATE ALCOHOL USE DISORDER: ICD-10-CM

## 2024-08-12 DIAGNOSIS — K21.9 GASTROESOPHAGEAL REFLUX DISEASE, UNSPECIFIED WHETHER ESOPHAGITIS PRESENT: ICD-10-CM

## 2024-08-12 PROCEDURE — 99214 OFFICE O/P EST MOD 30 MIN: CPT | Performed by: FAMILY MEDICINE

## 2024-08-12 PROCEDURE — 90677 PCV20 VACCINE IM: CPT | Performed by: FAMILY MEDICINE

## 2024-08-12 PROCEDURE — 90471 IMMUNIZATION ADMIN: CPT | Performed by: FAMILY MEDICINE

## 2024-08-12 RX ORDER — FLUTICASONE PROPIONATE 50 MCG
2 SPRAY, SUSPENSION (ML) NASAL DAILY
Qty: 16 G | Refills: 11 | Status: SHIPPED | OUTPATIENT
Start: 2024-08-12

## 2024-08-12 RX ORDER — OMEPRAZOLE 40 MG/1
40 CAPSULE, DELAYED RELEASE ORAL DAILY
Qty: 90 CAPSULE | Refills: 3 | Status: SHIPPED | OUTPATIENT
Start: 2024-08-12

## 2024-08-12 NOTE — PROGRESS NOTES
"Established Patient Office Visit      Patient Name: Tripp Lantigua  : 1965   MRN: [id number]   Care Team: Patient Care Team:  Venu Bermudez DO as PCP - General (Family Medicine)  Wallace Adhikari PA as Physician Assistant (Cardiology)    Chief Complaint:    Chief Complaint   Patient presents with    Back Pain    Choking     Pt. States food is getting stuck    Heartburn       History of Present Illness: Tripp Lantigua is a 59 y.o. male who is here today for chief complaint.    HPI    Answers submitted by the patient for this visit:  Primary Reason for Visit (Submitted on 2024)  What is the primary reason for your visit?: Other  Other (Submitted on 2024)  Please describe your symptoms.: Back pain; throat issues; vaccinations; lung screening;  Have you had these symptoms before?: Yes  How long have you been having these symptoms?: Greater than 2 weeks    Years ago was told he has \"scarring\" due to GERD. Lately has been notiving worsening trouble swallowing (achlasia). Not an every day thing, but enough to cause issues. Solids only, no liquids issue. Not taking any medication for reflux.  -1-2 drinks alcohol per day    Wants lung cancer screening    Pneumonia vaccine today    Started statin finally    This patient is accompanied by their self who contributes to the history of their care.    The following portions of the patient's history were reviewed and updated as appropriate: allergies, current medications, past family history, past medical history, past social history, past surgical history and problem list.    Subjective      Review of Systems:   Review of Systems - See HPI    Past Medical History:   Past Medical History:   Diagnosis Date    Allergic     Arthritis     Blood glucose elevated     Chronic bronchitis     Colitis     Colon polyp     Diverticulitis     Diverticulosis     GERD (gastroesophageal reflux disease)     Hypertension     IBS (irritable bowel syndrome)     Kidney " stone        Past Surgical History:   Past Surgical History:   Procedure Laterality Date    COLON RESECTION N/A 8/15/2018    Procedure: OPEN LOW ANTERIOR RESECTION WITH TAKEDOWN OF COLOVESICAL FISTULA, MOBILIZATION OF SPLENIC FLEXIURE, APPENDECTOMY, AND ILEOSTOMY;  Surgeon: Daniel Cline MD;  Location:  [facility];  Service: General    COLONOSCOPY      CYSTOSCOPY Bilateral 8/15/2018    Procedure: CYSTOSCOPY WITH BILATERAL URETERAL STENT PLACEMENT;  Surgeon: Daniel Cline MD;  Location:  [facility];  Service: General    JOINT REPLACEMENT Bilateral     hip       Family History:   Family History   Problem Relation Age of Onset    Diverticulosis Mother     Cancer Father     Diverticulosis Father     Cancer Maternal Grandfather        Social History:   Social History     Socioeconomic History    Marital status:    Tobacco Use    Smoking status: Never     Passive exposure: Never    Smokeless tobacco: Never   Vaping Use    Vaping status: Never Used   Substance and Sexual Activity    Alcohol use: Yes     Alcohol/week: 16.0 standard drinks of alcohol     Types: 16 Shots of liquor per week     Comment: Average 14 drinks a week    Drug use: No    Sexual activity: Yes     Partners: Female       Tobacco History:   Social History     Tobacco Use   Smoking Status Never    Passive exposure: Never   Smokeless Tobacco Never       Medications:   Outpatient Medications Prior to Visit   Medication Sig Dispense Refill    aspirin 81 MG chewable tablet Chew 1 tablet Daily. 90 tablet 3    B Complex Vitamins (VITAMIN B COMPLEX PO) Take 1 capsule by mouth Daily.      BREO ELLIPTA 200-25 MCG/INH inhaler Inhale 1 puff As Needed.      Coenzyme Q10 (CoQ10) 100 MG capsule Take 1 capsule by mouth Daily. Take with statin. 90 each 3    Glucosamine-Chondroit-Vit C-Mn (GLUCOSAMINE 1500 COMPLEX) capsule Take 1,500 mg by mouth Daily.      ibuprofen (ADVIL,MOTRIN) 200 MG tablet Take 1 tablet by mouth Every 6 (Six) Hours As Needed for Mild Pain.    "   Krill Oil 1000 MG capsule Take 1 capsule by mouth Daily.      L-GLUTAMINE PO Take 1 capsule by mouth Daily.      lisinopril (PRINIVIL,ZESTRIL) 10 MG tablet Take 1 tablet by mouth 2 (Two) Times a Week.      Melatonin 10 MG tablet Take  by mouth As Needed.      Milk Thistle 1000 MG capsule Take 1 capsule by mouth Daily.      Multiple Vitamins-Minerals (MULTIVITAMIN WITH MINERALS) tablet tablet Take 1 tablet by mouth Daily.      Ozempic, 1 MG/DOSE, 4 MG/3ML solution pen-injector Inject 1 mg under the skin into the appropriate area as directed 1 (One) Time Per Week. 9 mL 3    pitavastatin calcium (LIVALO) 2 MG tablet tablet Take 1 tablet by mouth Every Night. 90 tablet 1    tadalafil (CIALIS) 5 MG tablet Take 1 tablet by mouth Daily As Needed for Erectile Dysfunction.      testosterone (ANDROGEL) 50 MG/5GM (1%) gel gel Place 50 mg on the skin as directed by provider Daily. 30 each 2    vitamin C (ASCORBIC ACID) 500 MG tablet Take 1 tablet by mouth Daily.      fluticasone (FLONASE) 50 MCG/ACT nasal spray 2 sprays into the nostril(s) as directed by provider Daily. 16 g 11     No facility-administered medications prior to visit.        Allergies:   Allergies   Allergen Reactions    Metformin GI Intolerance    Acetaminophen Other (See Comments)     GI upset  GI upset         Objective   Objective     Physical Exam:  Vital Signs:   Vitals:    08/12/24 1056   BP: 124/78   Pulse: 64   SpO2: 98%   Weight: 115 kg (253 lb 12.8 oz)   Height: 182.9 cm (72.01\")   PainSc:   2   PainLoc: Generalized     Body mass index is 34.41 kg/m².     Physical Exam  Nursing note reviewed  Const: NAD, A&Ox4, Pleasant, Cooperative  Eyes: EOMI, no conjunctivitis  ENT: No nasal discharge present, neck supple  Cardiac: Regular rate and rhythm, no cyanosis  Resp: Respiratory rate within normal limits, no increased work of breathing, no audible wheezing or retractions noted  GI: No distention or ascites  MSK: Motor and sensation grossly intact in " bilateral upper extremities  Neurologic: CN II-XII grossly intact  Psych: Appropriate mood and behavior.  Skin: Warm, dry  Procedures/Radiology     Procedures  No radiology results for the last 7 days     Assessment & Plan   Assessment / Plan      Assessment/Plan:   Problems Addressed This Visit  Diagnoses and all orders for this visit:    1. Achalasia (Primary)  -     Ambulatory referral for Screening EGD    2. Gastroesophageal reflux disease, unspecified whether esophagitis present  -     Ambulatory referral for Screening EGD  -     omeprazole (priLOSEC) 40 MG capsule; Take 1 capsule by mouth Daily.  Dispense: 90 capsule; Refill: 3    3. Moderate alcohol use disorder  Comments:  Kapil with GERD& GLP-1 rx, reduce to < 4 per week    4. Immunization due  -     Pneumococcal Conjugate Vaccine 20-Valent All    Other orders  -     fluticasone (FLONASE) 50 MCG/ACT nasal spray; 2 sprays into the nostril(s) as directed by provider Daily.  Dispense: 16 g; Refill: 11      Problem List Items Addressed This Visit    None  Visit Diagnoses       Achalasia    -  Primary    Relevant Medications    omeprazole (priLOSEC) 40 MG capsule    Other Relevant Orders    Ambulatory referral for Screening EGD    Gastroesophageal reflux disease, unspecified whether esophagitis present        Relevant Medications    omeprazole (priLOSEC) 40 MG capsule    Other Relevant Orders    Ambulatory referral for Screening EGD    Moderate alcohol use disorder        Kapil with GERD& GLP-1 rx, reduce to < 4 per week    Immunization due        Relevant Orders    Pneumococcal Conjugate Vaccine 20-Valent All (Completed)          Orders Placed This Encounter   Procedures    Pneumococcal Conjugate Vaccine 20-Valent All    Ambulatory referral for Screening EGD     Referral Priority:   Routine     Referral Type:   Diagnostic Medical     Referral Reason:   Specialty Services Required     Number of Visits Requested:   1         Patient Instructions   Lung Cancer  Screening via CT scan can be obtained for $185 through [facility].  Currently these are being done at their [place] location at [address]  Their phone number is [phone].  When open, they are also done at their [place] location at [address]. [email or web address]  -This is an out-of-pocket cost and does not require an order or to be run through insurance.  It can be scheduled by patients.      Reduce alcohol to < 4 per week    Follow Up:   Return for (non-fasting blood work 1 week prior to appt).        DO SALUD Sánchez RD  [facility]  [address] [facility] [address]  Fax [phone]  Phone [phone]    Disclaimer to patients: The 21st Century Cares Act makes medical notes like these available to patients in the interest of transparency. However, please be advised that this is still a medical document. It is intended as [id number]-vijt communication. Many sections may include medical language or jargon, abbreviations, and additional verbiage that are unfamiliar or confusing. In some ways it may come across as blunt, direct, or may be summarized in order to clearly and concisely communicate the most crucial information to medical professionals. It may also include mentions of conditions that are unlikely but considered as part of the differential diagnosis, including serious disorders. These are not always discussed at length at the time of appointment because their likelihood is so low, but may be included in a medical note to make it clear what has been considered and/or ruled out as part of a work-up. Medical documents are intended to carry relevant information, facts as evident, and the personal clinical opinion of the physician. If you have any questions regarding this medical document, please bring them to the attention of the physician at your next  scheduled appointment.

## 2024-08-12 NOTE — LETTER
[facility]  Vaccine Consent Form    Patient Name:  Tripp Lantigua  Patient :  1965     Vaccine(s) Ordered    Pneumococcal Conjugate Vaccine 20-Valent All        Screening Checklist  The following questions should be completed prior to vaccination. If you answer “yes” to any question, it does not necessarily mean you should not be vaccinated. It just means we may need to clarify or ask more questions. If a question is unclear, please ask your healthcare provider to explain it.    Yes No   Any fever or moderate to severe illness today (mild illness and/or antibiotic treatment are not contraindications)?     Do you have a history of a serious reaction to any previous vaccinations, such as anaphylaxis, encephalopathy within 7 days, Guillain-[place] syndrome within 6 weeks, seizure?     Have you received any live vaccine(s) (e.g MMR, ALEXI) or any other vaccines in the last month (to ensure duplicate doses aren't given)?     Do you have an anaphylactic allergy to latex (DTaP, DTaP-IPV, Hep A, Hep B, MenB, RV, Td, Tdap), baker’s yeast (Hep B, HPV), polysorbates (RSV, nirsevimab, PCV 20, Rotavirrus, Tdap, Shingrix), or gelatin (ALEXI, MMR)?     Do you have an anaphylactic allergy to neomycin (Rabies, ALEXI, MMR, IPV, Hep A), polymyxin B (IPV), or streptomycin (IPV)?      Any cancer, leukemia, AIDS, or other immune system disorder? (ALEXI, MMR, RV)     Do you have a parent, brother, or sister with an immune system problem (if immune competence of vaccine recipient clinically verified, can proceed)? (MMR, ALEXI)     Any recent steroid treatments for >2 weeks, chemotherapy, or radiation treatment? (ALEXI, MMR)     Have you received antibody-containing blood transfusions or IVIG in the past 11 months (recommended interval is dependent on product)? (MMR, ALEXI)     Have you taken antiviral drugs (acyclovir, famciclovir, valacyclovir for ALEXI) in the last 24 or 48 hours, respectively?      Are you pregnant or planning to become  "pregnant within 1 month? (ALEXI, MMR, HPV, IPV, MenB, Abrexvy; For Hep B- refer to Engerix-B; For RSV - Abrysvo is indicated for 32-36 weeks of pregnancy from September to January)     For infants, have you ever been told your child has had intussusception or a medical emergency involving obstruction of the intestine (Rotavirus)? If not for an infant, can skip this question.         *Ordering Physicians/APC should be consulted if \"yes\" is checked by the patient or guardian above.  I have received, read, and understand the Vaccine Information Statement (VIS) for each vaccine ordered.  I have considered my or my child's health status as well as the health status of my close contacts.  I have taken the opportunity to discuss my vaccine questions with my or my child's health care provider.   I have requested that the ordered vaccine(s) be given to me or my child.  I understand the benefits and risks of the vaccines.  I understand that I should remain in the clinic for 15 minutes after receiving the vaccine(s).  _________________________________________________________  Signature of Patient or Parent/Legal Guardian ____________________  Date     "

## 2024-08-12 NOTE — ASSESSMENT & PLAN NOTE
-Reviewed counseling and precautions for GLP-1 agonists. No known history of gallstones or pancreatitis. No personal or family history of MEN-II or medullary thyroid cancer. Encouraged to ensure adequate fluid intake with either Pedialyte or other electrolyte-containing beverage. Encouraged to avoid alcohol altogether, but cautioned strictly no more than 3 per week to minimize risk of pancreatitis. Advised to call if he develops abdominal pain, vomiting, abdominal masses, or gross food intolerance as these may be signs of pancreatitis. They have been counseled on the theoretical increased risk of medullary thyroid cancer and advised to call if he develops any trouble swallowing, throat pain or swelling. No palpable fullness or nodularity on exam. Encouraged lifestyle modifications to minimize reflux symptoms and irritation including small meals, waiting 1 hour before lying down, avoidance of caffeine/alcohol/nicotine, and OTC medications as-needed. Encouraged to avoid NSAIDs or take with food if necessary to avoid prolonged exposure in the stomach with delayed gastric emptying. Counseled on medication-specific dosing changes as applicable.

## 2024-08-12 NOTE — LETTER
[facility]  Vaccine Consent Form    Patient Name:  Tripp Lantigua  Patient :  1965        Screening Checklist  The following questions should be completed prior to vaccination. If you answer “yes” to any question, it does not necessarily mean you should not be vaccinated. It just means we may need to clarify or ask more questions. If a question is unclear, please ask your healthcare provider to explain it.    Yes No   Any fever or moderate to severe illness today (mild illness and/or antibiotic treatment are not contraindications)?     Do you have a history of a serious reaction to any previous vaccinations, such as anaphylaxis, encephalopathy within 7 days, Guillain-[place] syndrome within 6 weeks, seizure?     Have you received any live vaccine(s) (e.g MMR, ALEXI) or any other vaccines in the last month (to ensure duplicate doses aren't given)?     Do you have an anaphylactic allergy to latex (DTaP, DTaP-IPV, Hep A, Hep B, MenB, RV, Td, Tdap), baker’s yeast (Hep B, HPV), polysorbates (RSV, nirsevimab, PCV 20, Rotavirrus, Tdap, Shingrix), or gelatin (ALEXI, MMR)?     Do you have an anaphylactic allergy to neomycin (Rabies, ALEXI, MMR, IPV, Hep A), polymyxin B (IPV), or streptomycin (IPV)?      Any cancer, leukemia, AIDS, or other immune system disorder? (ALXEI, MMR, RV)     Do you have a parent, brother, or sister with an immune system problem (if immune competence of vaccine recipient clinically verified, can proceed)? (MMR, ALEXI)     Any recent steroid treatments for >2 weeks, chemotherapy, or radiation treatment? (ALEXI, MMR)     Have you received antibody-containing blood transfusions or IVIG in the past 11 months (recommended interval is dependent on product)? (MMR, ALEXI)     Have you taken antiviral drugs (acyclovir, famciclovir, valacyclovir for ALEXI) in the last 24 or 48 hours, respectively?      Are you pregnant or planning to become pregnant within 1 month? (ALEXI, MMR, HPV, IPV, MenB, Abrexvy; For Hep B-  "refer to Engerix-B; For RSV - Abrysvo is indicated for 32-36 weeks of pregnancy from September to January)     For infants, have you ever been told your child has had intussusception or a medical emergency involving obstruction of the intestine (Rotavirus)? If not for an infant, can skip this question.         *Ordering Physicians/APC should be consulted if \"yes\" is checked by the patient or guardian above.  I have received, read, and understand the Vaccine Information Statement (VIS) for each vaccine ordered.  I have considered my or my child's health status as well as the health status of my close contacts.  I have taken the opportunity to discuss my vaccine questions with my or my child's health care provider.   I have requested that the ordered vaccine(s) be given to me or my child.  I understand the benefits and risks of the vaccines.  I understand that I should remain in the clinic for 15 minutes after receiving the vaccine(s).  _________________________________________________________  Signature of Patient or Parent/Legal Guardian ____________________  Date       "

## 2024-08-12 NOTE — PATIENT INSTRUCTIONS
Lung Cancer Screening via CT scan can be obtained for $185 through [facility].  Currently these are being done at their [place] location at [address].  Their phone number is [phone].  When open, they are also done at their [place] location at [address]. [email or web address]  -This is an out-of-pocket cost and does not require an order or to be run through insurance.  It can be scheduled by patients.      Reduce alcohol to < 4 per week

## 2024-08-16 ENCOUNTER — TELEPHONE (OUTPATIENT)
Dept: GASTROENTEROLOGY | Facility: CLINIC | Age: 59
End: 2024-08-16

## 2024-10-15 ENCOUNTER — OFFICE VISIT (OUTPATIENT)
Dept: FAMILY MEDICINE CLINIC | Facility: CLINIC | Age: 59
End: 2024-10-15
Payer: COMMERCIAL

## 2024-10-15 ENCOUNTER — LAB (OUTPATIENT)
Dept: LAB | Facility: HOSPITAL | Age: 59
End: 2024-10-15
Payer: COMMERCIAL

## 2024-10-15 VITALS
SYSTOLIC BLOOD PRESSURE: 142 MMHG | HEART RATE: 66 BPM | BODY MASS INDEX: 34.32 KG/M2 | WEIGHT: 253.4 LBS | OXYGEN SATURATION: 97 % | DIASTOLIC BLOOD PRESSURE: 88 MMHG | HEIGHT: 72 IN

## 2024-10-15 DIAGNOSIS — E78.5 DYSLIPIDEMIA, GOAL LDL BELOW 70: ICD-10-CM

## 2024-10-15 DIAGNOSIS — E11.9 TYPE 2 DIABETES MELLITUS WITHOUT COMPLICATION, WITHOUT LONG-TERM CURRENT USE OF INSULIN: Primary | Chronic | ICD-10-CM

## 2024-10-15 DIAGNOSIS — I25.10 CORONARY ARTERY DISEASE INVOLVING NATIVE CORONARY ARTERY OF NATIVE HEART WITHOUT ANGINA PECTORIS: ICD-10-CM

## 2024-10-15 DIAGNOSIS — E11.9 TYPE 2 DIABETES MELLITUS WITHOUT COMPLICATION, WITHOUT LONG-TERM CURRENT USE OF INSULIN: Chronic | ICD-10-CM

## 2024-10-15 DIAGNOSIS — E78.5 DYSLIPIDEMIA, GOAL LDL BELOW 70: Chronic | ICD-10-CM

## 2024-10-15 DIAGNOSIS — Z12.2 SCREENING FOR LUNG CANCER: ICD-10-CM

## 2024-10-15 DIAGNOSIS — R51.9 NONINTRACTABLE HEADACHE, UNSPECIFIED CHRONICITY PATTERN, UNSPECIFIED HEADACHE TYPE: ICD-10-CM

## 2024-10-15 LAB
EXPIRATION DATE: ABNORMAL
HBA1C MFR BLD: 5.9 % (ref 4.8–5.6)
HBA1C MFR BLD: 6.3 % (ref 4.5–5.7)
Lab: ABNORMAL

## 2024-10-15 PROCEDURE — 81599 UNLISTED MAAA: CPT

## 2024-10-15 PROCEDURE — 82172 ASSAY OF APOLIPOPROTEIN: CPT

## 2024-10-15 PROCEDURE — 80053 COMPREHEN METABOLIC PANEL: CPT

## 2024-10-15 PROCEDURE — 80061 LIPID PANEL: CPT

## 2024-10-15 PROCEDURE — 83036 HEMOGLOBIN GLYCOSYLATED A1C: CPT

## 2024-10-15 PROCEDURE — 0024U GLYCA NUC MR SPECTRSC QUAN: CPT

## 2024-10-15 NOTE — PROGRESS NOTES
Established Patient Office Visit      Patient Name: Tripp Lantigua  : 1965   MRN: [id number]   Care Team: Patient Care Team:  Venu Bermudez DO as PCP - General (Family Medicine)  Wallace Adhikari PA as Physician Assistant (Cardiology)    Chief Complaint:    Chief Complaint   Patient presents with    Diabetes       History of Present Illness: Tripp Lantigua is a 59 y.o. male who is here today for chief complaint.    HPI    He is in need of a repeat fasting lipid panel, he finally started the pitavastatin before last visit.  He had repeat labs ordered in , has not had these completed yet.  Switched from chewable ASA to buffered, swallowing issue is a little better.    This patient is accompanied by their self who contributes to the history of their care.    The following portions of the patient's history were reviewed and updated as appropriate: allergies, current medications, past family history, past medical history, past social history, past surgical history and problem list.    Subjective      Review of Systems:   Review of Systems - See HPI    Past Medical History:   Past Medical History:   Diagnosis Date    Allergic     Arthritis     Blood glucose elevated     Chronic bronchitis     Colitis     Colon polyp     Diverticulitis     Diverticulosis     GERD (gastroesophageal reflux disease)     Hypertension     IBS (irritable bowel syndrome)     Kidney stone        Past Surgical History:   Past Surgical History:   Procedure Laterality Date    COLON RESECTION N/A 8/15/2018    Procedure: OPEN LOW ANTERIOR RESECTION WITH TAKEDOWN OF COLOVESICAL FISTULA, MOBILIZATION OF SPLENIC FLEXIURE, APPENDECTOMY, AND ILEOSTOMY;  Surgeon: Daniel Cline MD;  Location: [facility] OR;  Service: General    COLONOSCOPY      CYSTOSCOPY Bilateral 8/15/2018    Procedure: CYSTOSCOPY WITH BILATERAL URETERAL STENT PLACEMENT;  Surgeon: Daniel Cline MD;  Location:  [facility] OR;  Service: General    JOINT REPLACEMENT  Bilateral     hip       Family History:   Family History   Problem Relation Age of Onset    Diverticulosis Mother     Cancer Father     Diverticulosis Father     Cancer Maternal Grandfather        Social History:   Social History     Socioeconomic History    Marital status:    Tobacco Use    Smoking status: Never     Passive exposure: Never    Smokeless tobacco: Never   Vaping Use    Vaping status: Never Used   Substance and Sexual Activity    Alcohol use: Yes     Alcohol/week: 16.0 standard drinks of alcohol     Types: 16 Shots of liquor per week     Comment: Average 14 drinks a week    Drug use: No    Sexual activity: Yes     Partners: Female       Tobacco History:   Social History     Tobacco Use   Smoking Status Never    Passive exposure: Never   Smokeless Tobacco Never       Medications:   Outpatient Medications Prior to Visit   Medication Sig Dispense Refill    aspirin 81 MG chewable tablet Chew 1 tablet Daily. 90 tablet 3    B Complex Vitamins (VITAMIN B COMPLEX PO) Take 1 capsule by mouth Daily.      BREO ELLIPTA 200-25 MCG/INH inhaler Inhale 1 puff As Needed.      Coenzyme Q10 (CoQ10) 100 MG capsule Take 1 capsule by mouth Daily. Take with statin. 90 each 3    fluticasone (FLONASE) 50 MCG/ACT nasal spray 2 sprays into the nostril(s) as directed by provider Daily. 16 g 11    Glucosamine-Chondroit-Vit C-Mn (GLUCOSAMINE 1500 COMPLEX) capsule Take 1,500 mg by mouth Daily.      ibuprofen (ADVIL,MOTRIN) 200 MG tablet Take 1 tablet by mouth Every 6 (Six) Hours As Needed for Mild Pain.      Krill Oil 1000 MG capsule Take 1 capsule by mouth Daily.      L-GLUTAMINE PO Take 1 capsule by mouth Daily.      lisinopril (PRINIVIL,ZESTRIL) 10 MG tablet Take 1 tablet by mouth 2 (Two) Times a Week.      Melatonin 10 MG tablet Take  by mouth As Needed.      Milk Thistle 1000 MG capsule Take 1 capsule by mouth Daily.      Multiple Vitamins-Minerals (MULTIVITAMIN WITH MINERALS) tablet tablet Take 1 tablet by mouth Daily.   "    omeprazole (priLOSEC) 40 MG capsule Take 1 capsule by mouth Daily. 90 capsule 3    Ozempic, 1 MG/DOSE, 4 MG/3ML solution pen-injector Inject 1 mg under the skin into the appropriate area as directed 1 (One) Time Per Week. 9 mL 3    pitavastatin calcium (LIVALO) 2 MG tablet tablet Take 1 tablet by mouth Every Night. 90 tablet 1    tadalafil (CIALIS) 5 MG tablet Take 1 tablet by mouth Daily As Needed for Erectile Dysfunction.      testosterone (ANDROGEL) 50 MG/5GM (1%) gel gel Place 50 mg on the skin as directed by provider Daily. 30 each 2    vitamin C (ASCORBIC ACID) 500 MG tablet Take 1 tablet by mouth Daily.       No facility-administered medications prior to visit.        Allergies:   Allergies   Allergen Reactions    Metformin GI Intolerance    Acetaminophen Other (See Comments)     GI upset  GI upset         Objective   Objective     Physical Exam:  Vital Signs:   Vitals:    10/15/24 1103   BP: 142/88   Pulse: 66   SpO2: 97%   Weight: 115 kg (253 lb 6.4 oz)   Height: 182.9 cm (72.01\")   PainSc: 0-No pain     Body mass index is 34.36 kg/m².     Physical Exam  Nursing note reviewed  Const: NAD, A&Ox4, Pleasant, Cooperative  Eyes: EOMI, no conjunctivitis  ENT: No nasal discharge present, neck supple  Cardiac: Regular rate and rhythm, no cyanosis  Resp: Respiratory rate within normal limits, no increased work of breathing, no audible wheezing or retractions noted  GI: No distention or ascites  MSK: Motor and sensation grossly intact in bilateral upper extremities  Neurologic: CN II-XII grossly intact  Psych: Appropriate mood and behavior.  Skin: Warm, dry  Procedures/Radiology     Procedures  No radiology results for the last 7 days     Assessment & Plan   Assessment / Plan      Assessment/Plan:   Problems Addressed This Visit  Diagnoses and all orders for this visit:    1. Type 2 diabetes mellitus without complication, without long-term current use of insulin (Primary)  Assessment & Plan:  A1c 6.3%, worse " "today from April. Continue Ozempic 1mg, work on diet and reducing carbohydrate intake.  -Reviewed counseling and precautions for GLP-1 agonists. No known history of gallstones or pancreatitis. No personal or family history of MEN-II or medullary thyroid cancer. Encouraged to ensure adequate fluid intake with either Pedialyte or other electrolyte-containing beverage. Encouraged to avoid alcohol altogether, but cautioned strictly no more than 3 per week to minimize risk of pancreatitis. Advised to call if he develops abdominal pain, vomiting, abdominal masses, or gross food intolerance as these may be signs of pancreatitis. They have been counseled on the theoretical increased risk of medullary thyroid cancer and advised to call if he develops any trouble swallowing, throat pain or swelling. No palpable fullness or nodularity on exam. Encouraged lifestyle modifications to minimize reflux symptoms and irritation including small meals, waiting 1 hour before lying down, avoidance of caffeine/alcohol/nicotine, and OTC medications as-needed. Encouraged to avoid NSAIDs or take with food if necessary to avoid prolonged exposure in the stomach with delayed gastric emptying. Counseled on medication-specific dosing changes as applicable.    Orders:  -     POC Glycosylated Hemoglobin (Hb A1C)    2. Dyslipidemia, goal LDL below 70    3. Screening for lung cancer  -      CT Chest Low Dose Cancer Screening WO; Future    4. Nonintractable headache, unspecified chronicity pattern, unspecified headache type  -     MRI Brain Without Contrast; Future      Problem List Items Addressed This Visit          Cardiac and Vasculature    Dyslipidemia, goal LDL below 70 (Chronic)    Overview     He reported side effects of abdominal pain from the rosuvastatin 10 mg, and previously declined any statin going forward, \"even if it leads to a cardiac event.\" He is now open to trying pitavastatin 2mg, will start June 2024.            Endocrine and " Metabolic    Type 2 diabetes mellitus, without long-term current use of insulin - Primary (Chronic)    Current Assessment & Plan     A1c 6.3%, worse today from April. Continue Ozempic 1mg, work on diet and reducing carbohydrate intake.  -Reviewed counseling and precautions for GLP-1 agonists. No known history of gallstones or pancreatitis. No personal or family history of MEN-II or medullary thyroid cancer. Encouraged to ensure adequate fluid intake with either Pedialyte or other electrolyte-containing beverage. Encouraged to avoid alcohol altogether, but cautioned strictly no more than 3 per week to minimize risk of pancreatitis. Advised to call if he develops abdominal pain, vomiting, abdominal masses, or gross food intolerance as these may be signs of pancreatitis. They have been counseled on the theoretical increased risk of medullary thyroid cancer and advised to call if he develops any trouble swallowing, throat pain or swelling. No palpable fullness or nodularity on exam. Encouraged lifestyle modifications to minimize reflux symptoms and irritation including small meals, waiting 1 hour before lying down, avoidance of caffeine/alcohol/nicotine, and OTC medications as-needed. Encouraged to avoid NSAIDs or take with food if necessary to avoid prolonged exposure in the stomach with delayed gastric emptying. Counseled on medication-specific dosing changes as applicable.         Relevant Orders    POC Glycosylated Hemoglobin (Hb A1C) (Completed)     Other Visit Diagnoses       Screening for lung cancer        Relevant Orders     CT Chest Low Dose Cancer Screening WO    Nonintractable headache, unspecified chronicity pattern, unspecified headache type        Relevant Orders    MRI Brain Without Contrast          Orders Placed This Encounter   Procedures    MRI Brain Without Contrast     Standing Status:   Future     Standing Expiration Date:   10/15/2025     Scheduling Instructions:      Patient to schedule      Order Specific Question:   Reason for Exam:     Answer:   headache     Order Specific Question:   Release to patient     Answer:   Routine Release [[id number]]     CT Chest Low Dose Cancer Screening WO     Standing Status:   Future     Standing Expiration Date:   10/15/2025     Scheduling Instructions:      Patient to schedule     Order Specific Question:   The patient is age 50-80 (Medicare coverage 50-77)     Answer:   59     Order Specific Question:   The patient is a current smoker?     Answer:   No     Order Specific Question:   Is the patient a former smoker who has quit within the last 15 years? (If the answer to this is no they do not meet criteria for this exam)     Answer:   No     Order Specific Question:   Does the patient have a smoking history of 20 pack-years or greater? (If the answer to this is no they do not meet criteria for this exam)     Answer:   No     Order Specific Question:   Has the Patient had a Chest CT scan within the past 12 months?     Answer:   No     Order Specific Question:   Does the patient have any clinical signs/symptoms of lung cancer?     Answer:   No     Order Specific Question:   The patient was engaged in shared decision-making for this test:     Answer:   Yes    POC Glycosylated Hemoglobin (Hb A1C)     Order Specific Question:   Release to patient     Answer:   Routine Release [[id number]]     A1c worsening up to 6.3% today, recommend decreasing alcohol use (1-2 per day currently) and cutting back on fig newtons. He thinks his control will get better after FDC, which he may consider doing in a couple of years.    There are no Patient Instructions on file for this visit.    Follow Up:   Return in about 3 months (around 1/15/2025) for with A1c;.      DO SALUD Sánchez RD  [facility]  [address]  [facility] [address]  Fax [phone]  Phone [phone]    Disclaimer to patients: The  21[employer] C[employer] Act makes medical notes like these available to patients in the interest of transparency. However, please be advised that this is still a medical document. It is intended as [id number]-[id number] communication. Many sections may include medical language or jargon, abbreviations, and additional verbiage that are unfamiliar or confusing. In some ways it may come across as blunt, direct, or may be summarized in order to clearly and concisely communicate the most crucial information to medical professionals. It may also include mentions of conditions that are unlikely but considered as part of the differential diagnosis, including serious disorders. These are not always discussed at length at the time of appointment because their likelihood is so low, but may be included in a medical note to make it clear what has been considered and/or ruled out as part of a work-up. Medical documents are intended to carry relevant information, facts as evident, and the personal clinical opinion of the physician. If you have any questions regarding this medical document, please bring them to the attention of the physician at your next scheduled appointment.

## 2024-10-15 NOTE — ASSESSMENT & PLAN NOTE
A1c 6.3%, worse today from April. Continue Ozempic 1mg, work on diet and reducing carbohydrate intake.  -Reviewed counseling and precautions for GLP-1 agonists. No known history of gallstones or pancreatitis. No personal or family history of MEN-II or medullary thyroid cancer. Encouraged to ensure adequate fluid intake with either Pedialyte or other electrolyte-containing beverage. Encouraged to avoid alcohol altogether, but cautioned strictly no more than 3 per week to minimize risk of pancreatitis. Advised to call if he develops abdominal pain, vomiting, abdominal masses, or gross food intolerance as these may be signs of pancreatitis. They have been counseled on the theoretical increased risk of medullary thyroid cancer and advised to call if he develops any trouble swallowing, throat pain or swelling. No palpable fullness or nodularity on exam. Encouraged lifestyle modifications to minimize reflux symptoms and irritation including small meals, waiting 1 hour before lying down, avoidance of caffeine/alcohol/nicotine, and OTC medications as-needed. Encouraged to avoid NSAIDs or take with food if necessary to avoid prolonged exposure in the stomach with delayed gastric emptying. Counseled on medication-specific dosing changes as applicable.

## 2024-10-16 LAB
ALBUMIN SERPL-MCNC: 4.5 G/DL (ref 3.5–5.2)
ALBUMIN/GLOB SERPL: 2 G/DL
ALP SERPL-CCNC: 68 U/L (ref 39–117)
ALT SERPL W P-5'-P-CCNC: 17 U/L (ref 1–41)
ANION GAP SERPL CALCULATED.3IONS-SCNC: 9.9 MMOL/L (ref 5–15)
AST SERPL-CCNC: 14 U/L (ref 1–40)
BILIRUB SERPL-MCNC: 0.6 MG/DL (ref 0–1.2)
BUN SERPL-MCNC: 16 MG/DL (ref 8–23)
BUN/CREAT SERPL: 17.2 (ref 7–25)
CALCIUM SPEC-SCNC: 9.2 MG/DL (ref 8.6–10.5)
CHLORIDE SERPL-SCNC: 104 MMOL/L (ref 98–107)
CO2 SERPL-SCNC: 25.1 MMOL/L (ref 22–29)
CREAT SERPL-MCNC: 0.93 MG/DL (ref 0.76–1.27)
EGFRCR SERPLBLD CKD-EPI 2021: 93.4 ML/MIN/1.73
GLOBULIN UR ELPH-MCNC: 2.3 GM/DL
GLUCOSE SERPL-MCNC: 114 MG/DL (ref 65–99)
POTASSIUM SERPL-SCNC: 4.4 MMOL/L (ref 3.5–5.2)
PROT SERPL-MCNC: 6.8 G/DL (ref 6–8.5)
SODIUM SERPL-SCNC: 139 MMOL/L (ref 136–145)

## 2024-10-17 LAB
APO B SERPL-MCNC: 72 MG/DL
CHOLEST SERPL-MCNC: 156 MG/DL (ref 100–199)
DIABETES RISK SCORE CALC: 52 (ref 0–49)
GLYCA SERPL-SCNC: 358 UMOL/L
HDLC SERPL-MCNC: 58 MG/DL
LDLC SERPL CALC-MCNC: 81 MG/DL (ref 0–99)
Lab: ABNORMAL
NONHDLC SERPL-MCNC: 98 MG/DL (ref 0–129)
TRIGL SERPL-MCNC: 91 MG/DL (ref 0–149)

## 2024-12-30 ENCOUNTER — TELEPHONE (OUTPATIENT)
Dept: CARDIOLOGY | Facility: CLINIC | Age: 59
End: 2024-12-30

## 2024-12-30 NOTE — TELEPHONE ENCOUNTER
Caller: Tripp Lantigua    Relationship to patient: Self    Best call back number: [phone]    Chief complaint: PT WOULD LIKE TO SCHEDULE A STRESS TEST. HE DISCUSSED IT AT HIS LAST APPOINTMENT AND HE IS READY TO MOVE FORWARD WITH THAT.     Type of visit: STRESS TEST    Requested date: ASAP     If rescheduling, when is the original appointment:      Additional notes: NO NEW OR WORSENING MEDICAL CONCERNS

## 2024-12-30 NOTE — TELEPHONE ENCOUNTER
Sw patient, patient would like to schedule stress test, but not a NM stress test due to cost. Patient would like just the treadmill test, if this is ok with Dr Gutierrez. thanks

## 2025-01-09 ENCOUNTER — OFFICE VISIT (OUTPATIENT)
Dept: FAMILY MEDICINE CLINIC | Facility: CLINIC | Age: 60
End: 2025-01-09
Payer: COMMERCIAL

## 2025-01-09 ENCOUNTER — OFFICE VISIT (OUTPATIENT)
Dept: CARDIOLOGY | Facility: CLINIC | Age: 60
End: 2025-01-09
Payer: COMMERCIAL

## 2025-01-09 VITALS
DIASTOLIC BLOOD PRESSURE: 78 MMHG | HEIGHT: 72 IN | HEART RATE: 83 BPM | SYSTOLIC BLOOD PRESSURE: 118 MMHG | WEIGHT: 260.4 LBS | OXYGEN SATURATION: 97 % | BODY MASS INDEX: 35.27 KG/M2

## 2025-01-09 VITALS
HEART RATE: 84 BPM | OXYGEN SATURATION: 97 % | HEIGHT: 72 IN | DIASTOLIC BLOOD PRESSURE: 78 MMHG | SYSTOLIC BLOOD PRESSURE: 118 MMHG | BODY MASS INDEX: 34.97 KG/M2 | WEIGHT: 258.2 LBS

## 2025-01-09 DIAGNOSIS — E11.9 TYPE 2 DIABETES MELLITUS WITHOUT COMPLICATION, WITHOUT LONG-TERM CURRENT USE OF INSULIN: Primary | ICD-10-CM

## 2025-01-09 DIAGNOSIS — I10 PRIMARY HYPERTENSION: Chronic | ICD-10-CM

## 2025-01-09 DIAGNOSIS — M75.52 SUBACROMIAL BURSITIS OF LEFT SHOULDER JOINT: ICD-10-CM

## 2025-01-09 DIAGNOSIS — I25.10 CORONARY ARTERY DISEASE INVOLVING NATIVE CORONARY ARTERY OF NATIVE HEART WITHOUT ANGINA PECTORIS: Primary | ICD-10-CM

## 2025-01-09 LAB
EXPIRATION DATE: ABNORMAL
HBA1C MFR BLD: 6.9 % (ref 4.5–5.7)
Lab: ABNORMAL

## 2025-01-09 PROCEDURE — 83036 HEMOGLOBIN GLYCOSYLATED A1C: CPT | Performed by: FAMILY MEDICINE

## 2025-01-09 PROCEDURE — 99214 OFFICE O/P EST MOD 30 MIN: CPT | Performed by: PHYSICIAN ASSISTANT

## 2025-01-09 PROCEDURE — 99214 OFFICE O/P EST MOD 30 MIN: CPT | Performed by: FAMILY MEDICINE

## 2025-01-09 NOTE — PROGRESS NOTES
"Established Patient Office Visit      Patient Name: Tripp Lantigua  : 1965   MRN: [id number]   Care Team: Patient Care Team:  Venu Bermudez DO as PCP - General (Family Medicine)  Wallace Adhikari PA as Physician Assistant (Cardiology)    Chief Complaint:    Chief Complaint   Patient presents with    Diabetes     3 month A1C    Shoulder Pain       History of Present Illness: Tripp Lantigua is a 59 y.o. male who is here today for chief complaint.      Followup  Symptoms are: recurrent.   Onset was 1 to 6 months.   Symptoms include: joint pain, headaches, joint swelling, myalgias and neck pain.   Pertinent negative symptoms include no abdominal pain, no anorexia, no change in stool, no chest pain, no chills, no congestion, no cough, no diaphoresis, no fatigue, no fever, no nausea, no numbness, no rash, no sore throat, no swollen glands, no dysuria, no vertigo, no visual change, no vomiting and no weakness.     At his last visit we discussed decreasing his alcohol intake (was at 1-2 drinks nightly at that time) and cutting back on fig newtons.  We continued his Ozempic at 1 mg weekly.  He had a fasting lipid panel done which was improved from last year, LDL at 81 with an ApoB of 72, which is close to his goal.  His most recent A1c in October was 5.9%. He was only taking the statin 3 days per week on his last labs, he has gone up to every other day now. Saw his cardiologist this morning.    Alcohol intake is \"maybe a little less\", no fig newtons but has been eating a lot of geronimo cookies.    Left shoulder pain, especially when he wakes up in the morning (left lateral sleeping position).    This patient is accompanied by their self who contributes to the history of their care.    The following portions of the patient's history were reviewed and updated as appropriate: allergies, current medications, past family history, past medical history, past social history, past surgical history and problem " list.    Subjective      Review of Systems:   Review of Systems   Constitutional:  Negative for chills, diaphoresis, fatigue and fever.   HENT:  Negative for congestion, sore throat and swollen glands.    Respiratory:  Negative for cough.    Cardiovascular:  Negative for chest pain.   Gastrointestinal:  Negative for abdominal pain, anorexia, nausea and vomiting.   Genitourinary:  Negative for dysuria.   Musculoskeletal:  Positive for joint pain, myalgias and neck pain.   Skin:  Negative for rash.   Neurological:  Negative for vertigo, weakness and numbness.    - See HPI    Past Medical History:   Past Medical History:   Diagnosis Date    Allergic 1996    Arthritis     Blood glucose elevated     Chronic bronchitis     Colitis     Colon polyp     Diverticulitis     Diverticulosis     GERD (gastroesophageal reflux disease)     Hypertension     IBS (irritable bowel syndrome)     Kidney stone        Past Surgical History:   Past Surgical History:   Procedure Laterality Date    COLON RESECTION N/A 8/15/2018    Procedure: OPEN LOW ANTERIOR RESECTION WITH TAKEDOWN OF COLOVESICAL FISTULA, MOBILIZATION OF SPLENIC FLEXIURE, APPENDECTOMY, AND ILEOSTOMY;  Surgeon: Daniel Cline MD;  Location:  [facility];  Service: General    COLONOSCOPY      CYSTOSCOPY Bilateral 8/15/2018    Procedure: CYSTOSCOPY WITH BILATERAL URETERAL STENT PLACEMENT;  Surgeon: Daniel Cline MD;  Location:  [facility];  Service: General    JOINT REPLACEMENT Bilateral     hip       Family History:   Family History   Problem Relation Age of Onset    Diverticulosis Mother     Cancer Father     Diverticulosis Father     Cancer Maternal Grandfather     Heart disease Maternal Grandfather     Hypertension Maternal Grandfather        Social History:   Social History     Socioeconomic History    Marital status:    Tobacco Use    Smoking status: Never     Passive exposure: Never    Smokeless tobacco: Never   Vaping Use    Vaping status: Never Used   Substance  and Sexual Activity    Alcohol use: Yes     Alcohol/week: 16.0 standard drinks of alcohol     Types: 16 Shots of liquor per week     Comment: Average 14 drinks a week    Drug use: No    Sexual activity: Yes     Partners: Female       Tobacco History:   Social History     Tobacco Use   Smoking Status Never    Passive exposure: Never   Smokeless Tobacco Never       Medications:   Outpatient Medications Prior to Visit   Medication Sig Dispense Refill    aspirin 81 MG chewable tablet Chew 1 tablet Daily. 90 tablet 3    B Complex Vitamins (VITAMIN B COMPLEX PO) Take 1 capsule by mouth Daily.      lisinopril (PRINIVIL,ZESTRIL) 10 MG tablet Take 1 tablet by mouth 2 (Two) Times a Week.      Melatonin 10 MG tablet Take  by mouth As Needed.      Multiple Vitamins-Minerals (MULTIVITAMIN WITH MINERALS) tablet tablet Take 1 tablet by mouth Daily.      pitavastatin calcium (LIVALO) 2 MG tablet tablet Take 1 tablet by mouth Every Night. 90 tablet 1    tadalafil (CIALIS) 5 MG tablet Take 1 tablet by mouth Daily As Needed for Erectile Dysfunction.      Ozempic, 1 MG/DOSE, 4 MG/3ML solution pen-injector Inject 1 mg under the skin into the appropriate area as directed 1 (One) Time Per Week. 9 mL 3    BREO ELLIPTA 200-25 MCG/INH inhaler Inhale 1 puff As Needed. (Patient not taking: Reported on 1/9/2025)      Coenzyme Q10 (CoQ10) 100 MG capsule Take 1 capsule by mouth Daily. Take with statin. (Patient not taking: Reported on 1/9/2025) 90 each 3    fluticasone (FLONASE) 50 MCG/ACT nasal spray 2 sprays into the nostril(s) as directed by provider Daily. (Patient not taking: Reported on 1/9/2025) 16 g 11    Glucosamine-Chondroit-Vit C-Mn (GLUCOSAMINE 1500 COMPLEX) capsule Take 1,500 mg by mouth Daily. (Patient not taking: Reported on 1/9/2025)      ibuprofen (ADVIL,MOTRIN) 200 MG tablet Take 1 tablet by mouth Every 6 (Six) Hours As Needed for Mild Pain. (Patient not taking: Reported on 1/9/2025)      Krill Oil 1000 MG capsule Take 1 capsule  "by mouth Daily. (Patient not taking: Reported on 1/9/2025)      L-GLUTAMINE PO Take 1 capsule by mouth Daily. (Patient not taking: Reported on 1/9/2025)      Milk Thistle 1000 MG capsule Take 1 capsule by mouth Daily. (Patient not taking: Reported on 1/9/2025)      omeprazole (priLOSEC) 40 MG capsule Take 1 capsule by mouth Daily. (Patient not taking: Reported on 1/9/2025) 90 capsule 3    testosterone (ANDROGEL) 50 MG/5GM (1%) gel gel Place 50 mg on the skin as directed by provider Daily. (Patient not taking: Reported on 1/9/2025) 30 each 2    vitamin C (ASCORBIC ACID) 500 MG tablet Take 1 tablet by mouth Daily. (Patient not taking: Reported on 1/9/2025)       No facility-administered medications prior to visit.        Allergies:   Allergies   Allergen Reactions    Metformin GI Intolerance    Acetaminophen Other (See Comments)     GI upset  GI upset         Objective   Objective     Physical Exam:  Vital Signs:   Vitals:    01/09/25 1109   BP: 118/78   Pulse: 83   SpO2: 97%   Weight: 118 kg (260 lb 6.4 oz)   Height: 182.9 cm (72.01\")     Body mass index is 35.31 kg/m².     Physical Exam  Nursing note reviewed  Const: NAD, A&Ox4, Pleasant, Cooperative  Eyes: EOMI, no conjunctivitis  ENT: No nasal discharge present, neck supple  Cardiac: Regular rate and rhythm, no cyanosis  Resp: Respiratory rate within normal limits, no increased work of breathing, no audible wheezing or retractions noted  GI: No distention or ascites  MSK: Motor and sensation grossly intact in bilateral upper extremities  Neurologic: CN II-XII grossly intact  Psych: Appropriate mood and behavior.  Skin: Warm, dry  Procedures/Radiology     Procedures  No radiology results for the last 7 days     Assessment & Plan   Assessment / Plan      Assessment/Plan:   Problems Addressed This Visit  Diagnoses and all orders for this visit:    1. Type 2 diabetes mellitus without complication, without long-term current use of insulin (Primary)  -     POC " Glycosylated Hemoglobin (Hb A1C)    2. Subacromial bursitis of left shoulder joint      Problem List Items Addressed This Visit          Endocrine and Metabolic    Type 2 diabetes mellitus, without long-term current use of insulin - Primary (Chronic)    Relevant Orders    POC Glycosylated Hemoglobin (Hb A1C) (Completed)     Other Visit Diagnoses       Subacromial bursitis of left shoulder joint                Patient Instructions   A1c today 6.9%    Follow Up:   Return in about 3 months (around 4/9/2025).    DO SALUD Sánchez RD  [facility]  [address]  [facility] [address]  Fax [phone]  Phone [phone]    Disclaimer to patients: The 21st Century Cares Act makes medical notes like these available to patients in the interest of transparency. However, please be advised that this is still a medical document. It is intended as [id number] communication. Many sections may include medical language or jargon, abbreviations, and additional verbiage that are unfamiliar or confusing. In some ways it may come across as blunt, direct, or may be summarized in order to clearly and concisely communicate the most crucial information to medical professionals. It may also include mentions of conditions that are unlikely but considered as part of the differential diagnosis, including serious disorders. These are not always discussed at length at the time of appointment because their likelihood is so low, but may be included in a medical note to make it clear what has been considered and/or ruled out as part of a work-up. Medical documents are intended to carry relevant information, facts as evident, and the personal clinical opinion of the physician. If you have any questions regarding this medical document, please bring them to the attention of the physician at your next scheduled appointment.

## 2025-01-09 NOTE — PROGRESS NOTES
[facility]   OFFICE NOTE      Tripp Lantigua  1965  PCP: Venu Bermudez DO    SUBJECTIVE:   Tripp Lantigua is a 59 y.o. male seen for a follow up visit regarding the following:     CC:CAD    HPI:   Pleasant 59-year-old  for [employer] presents today for follow-up regarding coronary disease, hypertension, dyslipidemia.  Since last seen per office he reports no episodes of chest pain chest tightness dizziness near syncope.  Denies palpitations.  He is on Livalo taking this every other day his last LDL cholesterol is less than 100.  He had a previous calcium score test 2022 revealing moderate plaque in the RCA.  He reports his blood pressure well-controlled.  He denies any symptoms as mentioned above overall he feels like he is doing quite well at this time.    Cardiac PMH: (Old records have been reviewed and summarized below)  CAD  Remote Negative stress test with  2013  Calcium score test +  Moderate plaque, Negative calcium in LAD, CX and Left Main. 6/14/2022  DM Type II, Last HbA1c 5.9  HLD: Currently on Crestor. LDL 90, Trig 238 7/2022  HTN: Controlled.   Obesity, BMI is 34.8  Testosterone deficiency-On Androgel , Last T 168   ED   Remote: Fistula repair 2018    Past Medical History, Past Surgical History, Family history, Social History, and Medications were all reviewed with the patient today and updated as necessary.       Current Outpatient Medications:     aspirin 81 MG chewable tablet, Chew 1 tablet Daily., Disp: 90 tablet, Rfl: 3    B Complex Vitamins (VITAMIN B COMPLEX PO), Take 1 capsule by mouth Daily., Disp: , Rfl:     BREO ELLIPTA 200-25 MCG/INH inhaler, Inhale 1 puff As Needed., Disp: , Rfl:     Coenzyme Q10 (CoQ10) 100 MG capsule, Take 1 capsule by mouth Daily. Take with statin., Disp: 90 each, Rfl: 3    fluticasone (FLONASE) 50 MCG/ACT nasal spray, 2 sprays into the nostril(s) as directed by provider Daily.,  "Disp: 16 g, Rfl: 11    Glucosamine-Chondroit-Vit C-Mn (GLUCOSAMINE 1500 COMPLEX) capsule, Take 1,500 mg by mouth Daily., Disp: , Rfl:     ibuprofen (ADVIL,MOTRIN) 200 MG tablet, Take 1 tablet by mouth Every 6 (Six) Hours As Needed for Mild Pain., Disp: , Rfl:     Krill Oil 1000 MG capsule, Take 1 capsule by mouth Daily., Disp: , Rfl:     L-GLUTAMINE PO, Take 1 capsule by mouth Daily., Disp: , Rfl:     lisinopril (PRINIVIL,ZESTRIL) 10 MG tablet, Take 1 tablet by mouth 2 (Two) Times a Week., Disp: , Rfl:     Melatonin 10 MG tablet, Take  by mouth As Needed., Disp: , Rfl:     Milk Thistle 1000 MG capsule, Take 1 capsule by mouth Daily., Disp: , Rfl:     Multiple Vitamins-Minerals (MULTIVITAMIN WITH MINERALS) tablet tablet, Take 1 tablet by mouth Daily., Disp: , Rfl:     omeprazole (priLOSEC) 40 MG capsule, Take 1 capsule by mouth Daily., Disp: 90 capsule, Rfl: 3    Ozempic, 1 MG/DOSE, 4 MG/3ML solution pen-injector, Inject 1 mg under the skin into the appropriate area as directed 1 (One) Time Per Week., Disp: 9 mL, Rfl: 3    pitavastatin calcium (LIVALO) 2 MG tablet tablet, Take 1 tablet by mouth Every Night., Disp: 90 tablet, Rfl: 1    tadalafil (CIALIS) 5 MG tablet, Take 1 tablet by mouth Daily As Needed for Erectile Dysfunction., Disp: , Rfl:     testosterone (ANDROGEL) 50 MG/5GM (1%) gel gel, Place 50 mg on the skin as directed by provider Daily., Disp: 30 each, Rfl: 2    vitamin C (ASCORBIC ACID) 500 MG tablet, Take 1 tablet by mouth Daily., Disp: , Rfl:       Allergies   Allergen Reactions    Metformin GI Intolerance    Acetaminophen Other (See Comments)     GI upset  GI upset           PHYSICAL EXAM:    /78   Pulse 84   Ht 182.9 cm (72\")   Wt 117 kg (258 lb 3.2 oz)   SpO2 97%   BMI 35.02 kg/m²        Wt Readings from Last 5 Encounters:   01/09/25 117 kg (258 lb 3.2 oz)   10/15/24 115 kg (253 lb 6.4 oz)   08/12/24 115 kg (253 lb 12.8 oz)   07/08/24 116 kg (255 lb 6.4 oz)   06/26/24 116 kg (255 lb 3.2 " oz)       BP Readings from Last 5 Encounters:   01/09/25 118/78   10/15/24 142/88   08/12/24 124/78   07/08/24 112/82   06/26/24 128/86       General appearance - Alert, well appearing, and in no distress   Mental status - Affect appropriate to mood.  Eyes - Sclerae anicteric,  ENMT - Hearing grossly normal bilaterally, Dental hygiene good.  Neck - Carotids upstroke normal bilaterally, no bruits, no JVD.  Resp - Clear to auscultation, no wheezes, rales or rhonchi, symmetric air entry.  Heart - Normal rate, regular rhythm, normal S1, S2, no murmurs, rubs, clicks or gallops.  GI - Soft, nontender, nondistended, no masses or organomegaly.  Neurological - Grossly intact - normal speech, no focal findings  Musculoskeletal - No joint tenderness, deformity or swelling, no muscular tenderness noted.  Extremities - Peripheral pulses normal, no pedal edema, no clubbing or cyanosis.  Skin - Normal coloration and turgor.  Psych -  oriented to person, place, and time.    Medical problems and test results were reviewed with the patient today.       ASSESSMENT   1. CAD: Abnormal Calcium score.  Continue statin therapy risk factor modification.    2. HLD: Continue Livalo therapy      3. DM Type II    4. HTN: Controlled o Zestril well-controlled.    PLAN  Continue to focus on risk factor modification treatment dyslipidemia, hypertension.  Continue exercise weight loss.  Return follow-up or office 1 year or sooner as needed.  1/9/2025  09:38 EST   Electronically signed by DAMI Beckman, 07/08/24, 12:37 PM EDT.

## 2025-01-24 DIAGNOSIS — E11.9 TYPE 2 DIABETES MELLITUS WITHOUT COMPLICATION, WITHOUT LONG-TERM CURRENT USE OF INSULIN: ICD-10-CM

## 2025-01-24 RX ORDER — SEMAGLUTIDE 1.34 MG/ML
INJECTION, SOLUTION SUBCUTANEOUS
Qty: 3 ML | Refills: 0 | Status: SHIPPED | OUTPATIENT
Start: 2025-01-24

## 2025-01-28 ENCOUNTER — PATIENT MESSAGE (OUTPATIENT)
Dept: FAMILY MEDICINE CLINIC | Facility: CLINIC | Age: 60
End: 2025-01-28
Payer: COMMERCIAL

## 2025-01-28 DIAGNOSIS — I25.10 CORONARY ARTERY DISEASE INVOLVING NATIVE HEART WITHOUT ANGINA PECTORIS, UNSPECIFIED VESSEL OR LESION TYPE: ICD-10-CM

## 2025-01-28 DIAGNOSIS — E78.5 DYSLIPIDEMIA, GOAL LDL BELOW 70: ICD-10-CM

## 2025-01-28 DIAGNOSIS — I10 PRIMARY HYPERTENSION: ICD-10-CM

## 2025-01-28 DIAGNOSIS — E11.9 TYPE 2 DIABETES MELLITUS WITHOUT COMPLICATION, WITHOUT LONG-TERM CURRENT USE OF INSULIN: ICD-10-CM

## 2025-01-28 RX ORDER — FLUTICASONE PROPIONATE 50 MCG
2 SPRAY, SUSPENSION (ML) NASAL DAILY
Qty: 16 G | Refills: 11 | Status: SHIPPED | OUTPATIENT
Start: 2025-01-28

## 2025-01-28 RX ORDER — PITAVASTATIN CALCIUM 2.09 MG/1
2 TABLET, FILM COATED ORAL NIGHTLY
Qty: 90 TABLET | Refills: 1 | Status: SHIPPED | OUTPATIENT
Start: 2025-01-28

## 2025-01-28 RX ORDER — VITAMIN B COMPLEX
100 TABLET ORAL DAILY
Qty: 90 EACH | Refills: 3 | Status: SHIPPED | OUTPATIENT
Start: 2025-01-28

## 2025-01-28 RX ORDER — LISINOPRIL 10 MG/1
10 TABLET ORAL 2 TIMES WEEKLY
Start: 2025-01-30

## 2025-01-28 RX ORDER — SEMAGLUTIDE 1.34 MG/ML
INJECTION, SOLUTION SUBCUTANEOUS
Qty: 3 ML | Refills: 0 | OUTPATIENT
Start: 2025-01-28

## 2025-01-29 ENCOUNTER — PRIOR AUTHORIZATION (OUTPATIENT)
Dept: FAMILY MEDICINE CLINIC | Facility: CLINIC | Age: 60
End: 2025-01-29
Payer: COMMERCIAL

## 2025-01-29 NOTE — TELEPHONE ENCOUNTER
Message from Plan  CaseId:[id number];Status:Approved;Review Type:Prior Auth;Coverage Start Date:12/30/2024;Coverage End Date:01/29/2026;. Authorization Expiration Date: January 29, 2026

## 2025-02-26 ENCOUNTER — PATIENT MESSAGE (OUTPATIENT)
Dept: FAMILY MEDICINE CLINIC | Facility: CLINIC | Age: 60
End: 2025-02-26
Payer: COMMERCIAL

## 2025-02-26 DIAGNOSIS — E11.9 TYPE 2 DIABETES MELLITUS WITHOUT COMPLICATION, WITHOUT LONG-TERM CURRENT USE OF INSULIN: ICD-10-CM

## 2025-02-26 RX ORDER — SEMAGLUTIDE 1.34 MG/ML
INJECTION, SOLUTION SUBCUTANEOUS
Qty: 3 ML | Refills: 0 | Status: SHIPPED | OUTPATIENT
Start: 2025-02-26 | End: 2025-02-27 | Stop reason: SDUPTHER

## 2025-02-27 RX ORDER — SEMAGLUTIDE 1.34 MG/ML
1 INJECTION, SOLUTION SUBCUTANEOUS WEEKLY
Qty: 3 ML | Refills: 0 | Status: SHIPPED | OUTPATIENT
Start: 2025-02-27

## 2025-04-10 ENCOUNTER — LAB (OUTPATIENT)
Dept: LAB | Facility: HOSPITAL | Age: 60
End: 2025-04-10
Payer: COMMERCIAL

## 2025-04-10 ENCOUNTER — OFFICE VISIT (OUTPATIENT)
Dept: FAMILY MEDICINE CLINIC | Facility: CLINIC | Age: 60
End: 2025-04-10
Payer: COMMERCIAL

## 2025-04-10 VITALS
DIASTOLIC BLOOD PRESSURE: 84 MMHG | HEIGHT: 72 IN | BODY MASS INDEX: 35.3 KG/M2 | OXYGEN SATURATION: 97 % | WEIGHT: 260.6 LBS | HEART RATE: 81 BPM | SYSTOLIC BLOOD PRESSURE: 122 MMHG

## 2025-04-10 DIAGNOSIS — Z12.5 SCREENING PSA (PROSTATE SPECIFIC ANTIGEN): ICD-10-CM

## 2025-04-10 DIAGNOSIS — E78.5 DYSLIPIDEMIA, GOAL LDL BELOW 70: Chronic | ICD-10-CM

## 2025-04-10 DIAGNOSIS — I10 PRIMARY HYPERTENSION: Chronic | ICD-10-CM

## 2025-04-10 DIAGNOSIS — I25.10 CORONARY ARTERY DISEASE INVOLVING NATIVE CORONARY ARTERY OF NATIVE HEART WITHOUT ANGINA PECTORIS: ICD-10-CM

## 2025-04-10 DIAGNOSIS — E29.1 HYPOGONADISM IN MALE: ICD-10-CM

## 2025-04-10 DIAGNOSIS — M54.50 ACUTE MIDLINE LOW BACK PAIN WITHOUT SCIATICA: ICD-10-CM

## 2025-04-10 DIAGNOSIS — E11.9 TYPE 2 DIABETES MELLITUS WITHOUT COMPLICATION, WITHOUT LONG-TERM CURRENT USE OF INSULIN: ICD-10-CM

## 2025-04-10 DIAGNOSIS — Z00.00 PREVENTATIVE HEALTH CARE: ICD-10-CM

## 2025-04-10 DIAGNOSIS — Z13.220 SCREENING FOR HYPERLIPIDEMIA: ICD-10-CM

## 2025-04-10 DIAGNOSIS — Z13.89 SCREENING FOR BLOOD OR PROTEIN IN URINE: ICD-10-CM

## 2025-04-10 DIAGNOSIS — E11.9 TYPE 2 DIABETES MELLITUS WITHOUT COMPLICATION, WITHOUT LONG-TERM CURRENT USE OF INSULIN: Primary | ICD-10-CM

## 2025-04-10 DIAGNOSIS — E55.9 VITAMIN D DEFICIENCY: ICD-10-CM

## 2025-04-10 DIAGNOSIS — Z13.29 SCREENING FOR ENDOCRINE DISORDER: ICD-10-CM

## 2025-04-10 DIAGNOSIS — Z13.6 SCREENING FOR CARDIOVASCULAR CONDITION: ICD-10-CM

## 2025-04-10 LAB
25(OH)D3 SERPL-MCNC: 23.2 NG/ML (ref 30–100)
ALBUMIN SERPL-MCNC: 4.2 G/DL (ref 3.5–5.2)
ALBUMIN UR-MCNC: <1.2 MG/DL
ALBUMIN/GLOB SERPL: 1.8 G/DL
ALP SERPL-CCNC: 74 U/L (ref 39–117)
ALT SERPL W P-5'-P-CCNC: 14 U/L (ref 1–41)
ANION GAP SERPL CALCULATED.3IONS-SCNC: 12.8 MMOL/L (ref 5–15)
AST SERPL-CCNC: 17 U/L (ref 1–40)
BILIRUB SERPL-MCNC: 0.7 MG/DL (ref 0–1.2)
BILIRUB UR QL STRIP: NEGATIVE
BUN SERPL-MCNC: 15 MG/DL (ref 8–23)
BUN/CREAT SERPL: 15.2 (ref 7–25)
CALCIUM SPEC-SCNC: 9.1 MG/DL (ref 8.6–10.5)
CHLORIDE SERPL-SCNC: 106 MMOL/L (ref 98–107)
CHOLEST SERPL-MCNC: 141 MG/DL (ref 0–200)
CLARITY UR: CLEAR
CO2 SERPL-SCNC: 22.2 MMOL/L (ref 22–29)
COLOR UR: YELLOW
CREAT SERPL-MCNC: 0.99 MG/DL (ref 0.76–1.27)
CREAT UR-MCNC: 171.5 MG/DL
EGFRCR SERPLBLD CKD-EPI 2021: 86.7 ML/MIN/1.73
EXPIRATION DATE: ABNORMAL
GLOBULIN UR ELPH-MCNC: 2.4 GM/DL
GLUCOSE SERPL-MCNC: 184 MG/DL (ref 65–99)
GLUCOSE UR STRIP-MCNC: NEGATIVE MG/DL
HBA1C MFR BLD: 7.2 % (ref 4.8–5.6)
HBA1C MFR BLD: 7.3 % (ref 4.5–5.7)
HDLC SERPL-MCNC: 53 MG/DL (ref 40–60)
HGB UR QL STRIP.AUTO: NEGATIVE
KETONES UR QL STRIP: NEGATIVE
LDLC SERPL CALC-MCNC: 64 MG/DL (ref 0–100)
LDLC/HDLC SERPL: 1.14 {RATIO}
LEUKOCYTE ESTERASE UR QL STRIP.AUTO: NEGATIVE
Lab: ABNORMAL
MICROALBUMIN/CREAT UR: NORMAL MG/G{CREAT}
NITRITE UR QL STRIP: NEGATIVE
PH UR STRIP.AUTO: 6.5 [PH] (ref 5–8)
POTASSIUM SERPL-SCNC: 4.3 MMOL/L (ref 3.5–5.2)
PROT SERPL-MCNC: 6.6 G/DL (ref 6–8.5)
PROT UR QL STRIP: NEGATIVE
PSA SERPL-MCNC: 0.82 NG/ML (ref 0–4)
SODIUM SERPL-SCNC: 141 MMOL/L (ref 136–145)
SP GR UR STRIP: 1.02 (ref 1–1.03)
TESTOST SERPL-MCNC: 284 NG/DL (ref 193–740)
TRIGL SERPL-MCNC: 139 MG/DL (ref 0–150)
TSH SERPL DL<=0.05 MIU/L-ACNC: 0.97 UIU/ML (ref 0.27–4.2)
UROBILINOGEN UR QL STRIP: NORMAL
VLDLC SERPL-MCNC: 24 MG/DL (ref 5–40)

## 2025-04-10 PROCEDURE — 84443 ASSAY THYROID STIM HORMONE: CPT

## 2025-04-10 PROCEDURE — 82172 ASSAY OF APOLIPOPROTEIN: CPT

## 2025-04-10 PROCEDURE — 81003 URINALYSIS AUTO W/O SCOPE: CPT

## 2025-04-10 PROCEDURE — 82570 ASSAY OF URINE CREATININE: CPT

## 2025-04-10 PROCEDURE — 80061 LIPID PANEL: CPT

## 2025-04-10 PROCEDURE — G0103 PSA SCREENING: HCPCS

## 2025-04-10 PROCEDURE — 84403 ASSAY OF TOTAL TESTOSTERONE: CPT

## 2025-04-10 PROCEDURE — 80053 COMPREHEN METABOLIC PANEL: CPT

## 2025-04-10 PROCEDURE — 82306 VITAMIN D 25 HYDROXY: CPT

## 2025-04-10 PROCEDURE — 83036 HEMOGLOBIN GLYCOSYLATED A1C: CPT

## 2025-04-10 PROCEDURE — 82043 UR ALBUMIN QUANTITATIVE: CPT

## 2025-04-10 RX ORDER — SEMAGLUTIDE 2.68 MG/ML
2 INJECTION, SOLUTION SUBCUTANEOUS WEEKLY
Qty: 3 ML | Refills: 2 | Status: SHIPPED | OUTPATIENT
Start: 2025-04-10

## 2025-04-10 RX ORDER — TIZANIDINE 2 MG/1
1-2 TABLET ORAL NIGHTLY PRN
Qty: 30 TABLET | Refills: 0 | Status: SHIPPED | OUTPATIENT
Start: 2025-04-10

## 2025-04-10 NOTE — ASSESSMENT & PLAN NOTE
Worsening today, up from 6.9% to 7.3%.  He generally is not tracking his food intake and does not get much exercise on a regular basis.  Counseled on the importance of keeping a food journal, daily exercise  -Referral to diabetic education and nutritionist  -He would like to try increasing the Ozempic from 1 mg to 2 mg.  If this does not get him any more improvement, would recommend changing over to Mounjaro

## 2025-04-10 NOTE — PROGRESS NOTES
Established Patient Office Visit      Patient Name: Tripp Lantigua  : 1965   MRN: [id number]   Care Team: Patient Care Team:  Venu Bermudez DO as PCP - General (Family Medicine)  Wallace Adhikari PA as Physician Assistant (Cardiology)    Chief Complaint:    Chief Complaint   Patient presents with    Diabetes     a1c       History of Present Illness: Tripp Lantigua is a 59 y.o. male who is here today for chief complaint.    Back Pain  This is a new problem. The current episode started in the past 7 days. The problem occurs constantly. The problem has been improving since onset. The pain is present in the lumbar spine and sacro-iliac. The quality of the pain is described as aching and burning. The pain radiates to the left buttock and right buttock. The pain is at a severity of 2/10. The pain is The same all the time. The symptoms are aggravated by bending, lying down and sitting. Stiffness is present All day. Pertinent negatives include no abdominal pain, bladder incontinence, bowel incontinence, chest pain, dysuria, fever, leg pain, numbness, paresthesias, pelvic pain, perianal numbness, tingling, weakness or weight loss. Risk factors include history of cancer, lack of exercise, obesity and poor posture.     He is here today for routine follow-up on his diabetes.  He also has a new complaint of back pain for the last week.  He states that the back pain seems to flareup about once a year.  His current exercise is doing 50 crunches and 50 push-ups every day.    At his last visit we discussed decreasing his alcohol intake (was at 1-2 drinks nightly at that time) which has been an ongoing issue for him.  We continued his Ozempic at 1 mg weekly.  His most recent A1c in January was 6.9%, which was up from his previous in October which was 5.9%.    He reports he has been under a high amount of stress than usual.  His wife will be stopping work over the next few months, she is at [employer] age, he is also  teaching a number of classes and so has not had a lot of time to focus on his health.  Will be off work from May until August when school is out.  Has been taking the statin daily.  He is fasting for labs today.    This patient is accompanied by their self who contributes to the history of their care.    The following portions of the patient's history were reviewed and updated as appropriate: allergies, current medications, past family history, past medical history, past social history, past surgical history and problem list.    Subjective      Review of Systems:   Review of Systems   Constitutional:  Negative for fever and unexpected weight loss.   Cardiovascular:  Negative for chest pain.   Gastrointestinal:  Negative for abdominal pain and bowel incontinence.   Genitourinary:  Negative for dysuria, pelvic pain and urinary incontinence.   Musculoskeletal:  Positive for back pain.   Neurological:  Negative for tingling, weakness, numbness and paresthesias.    - See HPI    Past Medical History:   Past Medical History:   Diagnosis Date    Allergic 1996    Arthritis     Blood glucose elevated     Chronic bronchitis     Colitis     Colon polyp     Diverticulitis     Diverticulosis     GERD (gastroesophageal reflux disease)     Hypertension     IBS (irritable bowel syndrome)     Kidney stone        Past Surgical History:   Past Surgical History:   Procedure Laterality Date    COLON RESECTION N/A 8/15/2018    Procedure: OPEN LOW ANTERIOR RESECTION WITH TAKEDOWN OF COLOVESICAL FISTULA, MOBILIZATION OF SPLENIC FLEXIURE, APPENDECTOMY, AND ILEOSTOMY;  Surgeon: Daniel Cline MD;  Location: [facility] OR;  Service: General    COLONOSCOPY      CYSTOSCOPY Bilateral 8/15/2018    Procedure: CYSTOSCOPY WITH BILATERAL URETERAL STENT PLACEMENT;  Surgeon: Daniel Cline MD;  Location: [facility] OR;  Service: General    JOINT REPLACEMENT Bilateral     hip       Family History:   Family History   Problem Relation Age of Onset     Diverticulosis Mother     Cancer Father     Diverticulosis Father     Cancer Maternal Grandfather     Heart disease Maternal Grandfather     Hypertension Maternal Grandfather     Heart attack Maternal Grandfather        Social History:   Social History     Socioeconomic History    Marital status:    Tobacco Use    Smoking status: Never     Passive exposure: Never    Smokeless tobacco: Never   Vaping Use    Vaping status: Never Used   Substance and Sexual Activity    Alcohol use: Yes     Alcohol/week: 16.0 standard drinks of alcohol     Types: 16 Shots of liquor per week     Comment: Average 14 drinks a week    Drug use: No    Sexual activity: Yes     Partners: Female       Tobacco History:   Social History     Tobacco Use   Smoking Status Never    Passive exposure: Never   Smokeless Tobacco Never       Medications:   Outpatient Medications Prior to Visit   Medication Sig Dispense Refill    aspirin 81 MG chewable tablet Chew 1 tablet Daily. 90 tablet 3    B Complex Vitamins (VITAMIN B COMPLEX PO) Take 1 capsule by mouth Daily.      BREO ELLIPTA 200-25 MCG/INH inhaler Inhale 1 puff As Needed.      Coenzyme Q10 (CoQ10) 100 MG capsule Take 1 capsule by mouth Daily. Take with statin. 90 each 3    fluticasone (FLONASE) 50 MCG/ACT nasal spray Administer 2 sprays into the nostril(s) as directed by provider Daily. 16 g 11    Glucosamine-Chondroit-Vit C-Mn (GLUCOSAMINE 1500 COMPLEX) capsule Take 1,500 mg by mouth Daily.      ibuprofen (ADVIL,MOTRIN) 200 MG tablet Take 1 tablet by mouth Every 6 (Six) Hours As Needed for Mild Pain.      Krill Oil 1000 MG capsule Take 1 capsule by mouth Daily.      L-GLUTAMINE PO Take 1 capsule by mouth Daily.      lisinopril (PRINIVIL,ZESTRIL) 10 MG tablet Take 1 tablet by mouth 2 (Two) Times a Week.      Melatonin 10 MG tablet Take  by mouth As Needed.      Milk Thistle 1000 MG capsule Take 1 capsule by mouth Daily.      Multiple Vitamins-Minerals (MULTIVITAMIN WITH MINERALS) tablet  "tablet Take 1 tablet by mouth Daily.      pitavastatin calcium (LIVALO) 2 MG tablet tablet Take 1 tablet by mouth Every Night. 90 tablet 1    tadalafil (CIALIS) 5 MG tablet Take 1 tablet by mouth Daily As Needed for Erectile Dysfunction.      testosterone (ANDROGEL) 50 MG/5GM (1%) gel gel Place 50 mg on the skin as directed by provider Daily. 30 each 2    vitamin C (ASCORBIC ACID) 500 MG tablet Take 1 tablet by mouth Daily.      Semaglutide, 1 MG/DOSE, (Ozempic, 1 MG/DOSE,) 4 MG/3ML solution pen-injector Inject 1 mg under the skin into the appropriate area as directed 1 (One) Time Per Week. 3 mL 0    omeprazole (priLOSEC) 40 MG capsule Take 1 capsule by mouth Daily. (Patient not taking: Reported on 4/10/2025) 90 capsule 3     No facility-administered medications prior to visit.        Allergies:   Allergies   Allergen Reactions    Metformin GI Intolerance    Acetaminophen Other (See Comments)     GI upset  GI upset         Objective   Objective     Physical Exam:  Vital Signs:   Vitals:    04/10/25 0842   BP: 122/84   Pulse: 81   SpO2: 97%   Weight: 118 kg (260 lb 9.6 oz)   Height: 182.9 cm (72.01\")     Body mass index is 35.34 kg/m².     Physical Exam  Nursing note reviewed  Const: NAD, A&Ox4, Pleasant, Cooperative  Eyes: EOMI, no conjunctivitis  ENT: No nasal discharge present, neck supple  Cardiac: Regular rate and rhythm, no cyanosis  Resp: Respiratory rate within normal limits, no increased work of breathing, no audible wheezing or retractions noted  GI: No distention or ascites  MSK: Motor and sensation grossly intact in bilateral upper extremities  Neurologic: CN II-XII grossly intact  Psych: Appropriate mood and behavior.  Skin: Warm, dry  Procedures/Radiology     Procedures  No radiology results for the last 7 days     Assessment & Plan   Assessment / Plan      Assessment/Plan:   Problems Addressed This Visit  Diagnoses and all orders for this visit:    1. Type 2 diabetes mellitus without complication, " without long-term current use of insulin (Primary)  Assessment & Plan:  Worsening today, up from 6.9% to 7.3%.  He generally is not tracking his food intake and does not get much exercise on a regular basis.  Counseled on the importance of keeping a food journal, daily exercise  -Referral to diabetic education and nutritionist  -He would like to try increasing the Ozempic from 1 mg to 2 mg.  If this does not get him any more improvement, would recommend changing over to Mounjaro    Orders:  -     POC Glycosylated Hemoglobin (Hb A1C)  -     Hemoglobin A1c; Future  -     Ambulatory Referral to Nutrition Services  -     Ambulatory Referral to Diabetic Education  -     Ozempic, 2 MG/DOSE, 8 MG/3ML solution pen-injector; Inject 2 mg under the skin into the appropriate area as directed 1 (One) Time Per Week.  Dispense: 3 mL; Refill: 2    2. Screening for hyperlipidemia  -     Lipid Panel; Future    3. Preventative health care  -     Lipid Panel; Future  -     Hemoglobin A1c; Future  -     Comprehensive Metabolic Panel; Future  -     Urinalysis With Microscopic If Indicated (No Culture) - Urine, Clean Catch; Future  -     TSH Rfx On Abnormal To Free T4; Future  -     PSA Screen; Future  -     Microalbumin / Creatinine Urine Ratio - Urine, Clean Catch; Future  -     Testosterone; Future  -     Apolipoprotein B; Future  -     Vitamin D,25-Hydroxy; Future    4. Screening for endocrine disorder  -     Hemoglobin A1c; Future  -     Comprehensive Metabolic Panel; Future  -     TSH Rfx On Abnormal To Free T4; Future  -     Testosterone; Future    5. Screening for blood or protein in urine  -     Urinalysis With Microscopic If Indicated (No Culture) - Urine, Clean Catch; Future  -     Microalbumin / Creatinine Urine Ratio - Urine, Clean Catch; Future    6. Screening PSA (prostate specific antigen)  -     PSA Screen; Future    7. Screening for cardiovascular condition  -     Apolipoprotein B; Future    8. Dyslipidemia, goal LDL  "below 70  Assessment & Plan:  ApoB 92 --> 72   --> 81  He has been taking the pitavastatin daily, will have fasting lipid panel today.  Recheck FLP and apoB today    Orders:  -     Lipid Panel; Future  -     Apolipoprotein B; Future    9. Coronary artery disease involving native coronary artery of native heart without angina pectoris  -     Lipid Panel; Future  -     Apolipoprotein B; Future    10. Primary hypertension  Assessment & Plan:  At goal, continue lisinopril 10 mg twice weekly      11. Vitamin D deficiency  -     Vitamin D,25-Hydroxy; Future    12. Hypogonadism in male  Assessment & Plan:  And gel 50 mg daily, will have a.m. testosterone done today    Orders:  -     Testosterone; Future    13. Acute midline low back pain without sciatica  Comments:  Likely mechanical, given a handout on exercises, x-ray if pain persists  Orders:  -     tiZANidine (ZANAFLEX) 2 MG tablet; Take 0.5-1 tablets by mouth At Night As Needed for Muscle Spasms.  Dispense: 30 tablet; Refill: 0  -     XR Spine Lumbar 2 or 3 View; Future      Problem List Items Addressed This Visit          Cardiac and Vasculature    HTN (hypertension) (Chronic)    Overview   Lisinopril 10mg         Current Assessment & Plan   At goal, continue lisinopril 10 mg twice weekly         Dyslipidemia, goal LDL below 70 (Chronic)    Overview   He reported side effects of abdominal pain from the rosuvastatin 10 mg, and previously declined any statin going forward, \"even if it leads to a cardiac event.\"  He finally did start pitavastatin every other day in June 2024, increased to daily as of January 2025.         Current Assessment & Plan   ApoB 92 --> 72   --> 81  He has been taking the pitavastatin daily, will have fasting lipid panel today.  Recheck FLP and apoB today         Relevant Orders    Lipid Panel    Apolipoprotein B    Coronary artery disease involving native heart without angina pectoris    Overview   CACS- .6, total " 161.7  Rosuvastatin 10mg not tolerated  -Trial of pitavastatin 2mg daily         Relevant Orders    Lipid Panel    Apolipoprotein B       Endocrine and Metabolic    Type 2 diabetes mellitus, without long-term current use of insulin - Primary (Chronic)    Current Assessment & Plan   Worsening today, up from 6.9% to 7.3%.  He generally is not tracking his food intake and does not get much exercise on a regular basis.  Counseled on the importance of keeping a food journal, daily exercise  -Referral to diabetic education and nutritionist  -He would like to try increasing the Ozempic from 1 mg to 2 mg.  If this does not get him any more improvement, would recommend changing over to Mounjaro         Relevant Medications    Ozempic, 2 MG/DOSE, 8 MG/3ML solution pen-injector    Other Relevant Orders    POC Glycosylated Hemoglobin (Hb A1C) (Completed)    Hemoglobin A1c    Ambulatory Referral to Nutrition Services    Ambulatory Referral to Diabetic Education    Vitamin D deficiency    Relevant Orders    Vitamin D,25-Hydroxy    Hypogonadism in male    Current Assessment & Plan   And gel 50 mg daily, will have a.m. testosterone done today         Relevant Orders    Testosterone     Other Visit Diagnoses         Screening for hyperlipidemia        Relevant Orders    Lipid Panel      Preventative health care        Relevant Orders    Lipid Panel    Hemoglobin A1c    Comprehensive Metabolic Panel    Urinalysis With Microscopic If Indicated (No Culture) - Urine, Clean Catch    TSH Rfx On Abnormal To Free T4    PSA Screen    Microalbumin / Creatinine Urine Ratio - Urine, Clean Catch    Testosterone    Apolipoprotein B    Vitamin D,25-Hydroxy      Screening for endocrine disorder        Relevant Orders    Hemoglobin A1c    Comprehensive Metabolic Panel    TSH Rfx On Abnormal To Free T4    Testosterone      Screening for blood or protein in urine        Relevant Orders    Urinalysis With Microscopic If Indicated (No Culture) - Urine,  Clean Catch    Microalbumin / Creatinine Urine Ratio - Urine, Clean Catch      Screening PSA (prostate specific antigen)        Relevant Orders    PSA Screen      Screening for cardiovascular condition        Relevant Orders    Apolipoprotein B      Acute midline low back pain without sciatica        Likely mechanical, given a handout on exercises, x-ray if pain persists    Relevant Medications    tiZANidine (ZANAFLEX) 2 MG tablet    Other Relevant Orders    XR Spine Lumbar 2 or 3 View            Patient Instructions   Keep a food journal every single day  Get 10 minutes of vigorous exercise (sweating) every day    Follow Up:   Return in about 3 months (around 7/10/2025) for diabetes (with A1c).      DO SALUD Sánchez RD  [facility]  [address]  [facility] [address]  Fax [phone]  Phone [phone]    Disclaimer to patients: The 21[employer] Century C[employer] Act makes medical notes like these available to patients in the interest of transparency. However, please be advised that this is still a medical document. It is intended as [id number] communication. Many sections may include medical language or jargon, abbreviations, and additional verbiage that are unfamiliar or confusing. In some ways it may come across as blunt, direct, or may be summarized in order to clearly and concisely communicate the most crucial information to medical professionals. It may also include mentions of conditions that are unlikely but considered as part of the differential diagnosis, including serious disorders. These are not always discussed at length at the time of appointment because their likelihood is so low, but may be included in a medical note to make it clear what has been considered and/or ruled out as part of a work-up. Medical documents are intended to carry relevant information, facts as evident, and the personal clinical opinion of the physician. If  you have any questions regarding this medical document, please bring them to the attention of the physician at your next scheduled appointment.

## 2025-04-10 NOTE — ASSESSMENT & PLAN NOTE
ApoB 92 --> 72   --> 81  He has been taking the pitavastatin daily, will have fasting lipid panel today.  Recheck FLP and apoB today

## 2025-04-13 LAB — APO B SERPL-MCNC: 65 MG/DL

## 2025-04-19 DIAGNOSIS — Z00.00 PREVENTATIVE HEALTH CARE: Primary | ICD-10-CM

## 2025-05-27 RX ORDER — TADALAFIL 5 MG/1
5 TABLET ORAL DAILY PRN
Qty: 90 TABLET | Refills: 0 | Status: SHIPPED | OUTPATIENT
Start: 2025-05-27

## 2025-05-27 NOTE — TELEPHONE ENCOUNTER
Rx Refill Note  Requested Prescriptions     Pending Prescriptions Disp Refills    tadalafil (CIALIS) 5 MG tablet       Sig: Take 1 tablet by mouth Daily As Needed for Erectile Dysfunction.      Last office visit with prescribing clinician: 4/10/2025   Last telemedicine visit with prescribing clinician: Visit date not found   Next office visit with prescribing clinician: 7/10/2025     Madyson Alvarado MA  05/27/25, 12:53 EDT

## 2025-06-02 DIAGNOSIS — M54.50 ACUTE MIDLINE LOW BACK PAIN WITHOUT SCIATICA: ICD-10-CM

## 2025-06-03 RX ORDER — TIZANIDINE 2 MG/1
1-2 TABLET ORAL NIGHTLY PRN
Qty: 90 TABLET | Refills: 0 | Status: SHIPPED | OUTPATIENT
Start: 2025-06-03

## 2025-06-04 DIAGNOSIS — M54.50 ACUTE MIDLINE LOW BACK PAIN WITHOUT SCIATICA: Primary | ICD-10-CM

## 2025-06-04 DIAGNOSIS — R68.89 THROAT AND MOUTH SYMPTOM: ICD-10-CM

## 2025-07-10 ENCOUNTER — OFFICE VISIT (OUTPATIENT)
Dept: FAMILY MEDICINE CLINIC | Facility: CLINIC | Age: 60
End: 2025-07-10
Payer: COMMERCIAL

## 2025-07-10 ENCOUNTER — LAB (OUTPATIENT)
Dept: LAB | Facility: HOSPITAL | Age: 60
End: 2025-07-10
Payer: COMMERCIAL

## 2025-07-10 VITALS
OXYGEN SATURATION: 97 % | HEART RATE: 74 BPM | WEIGHT: 256.1 LBS | SYSTOLIC BLOOD PRESSURE: 124 MMHG | HEIGHT: 72 IN | BODY MASS INDEX: 34.69 KG/M2 | DIASTOLIC BLOOD PRESSURE: 82 MMHG

## 2025-07-10 DIAGNOSIS — F10.20 MODERATE ALCOHOL USE DISORDER: ICD-10-CM

## 2025-07-10 DIAGNOSIS — I25.10 CORONARY ARTERY DISEASE INVOLVING NATIVE CORONARY ARTERY OF NATIVE HEART WITHOUT ANGINA PECTORIS: ICD-10-CM

## 2025-07-10 DIAGNOSIS — E11.9 TYPE 2 DIABETES MELLITUS WITHOUT COMPLICATION, WITHOUT LONG-TERM CURRENT USE OF INSULIN: ICD-10-CM

## 2025-07-10 DIAGNOSIS — E55.9 VITAMIN D DEFICIENCY: ICD-10-CM

## 2025-07-10 DIAGNOSIS — Z11.59 ENCOUNTER FOR HEPATITIS C SCREENING TEST FOR LOW RISK PATIENT: ICD-10-CM

## 2025-07-10 DIAGNOSIS — E29.1 HYPOGONADISM IN MALE: ICD-10-CM

## 2025-07-10 DIAGNOSIS — E34.9 TESTOSTERONE DEFICIENCY: ICD-10-CM

## 2025-07-10 DIAGNOSIS — E11.9 TYPE 2 DIABETES MELLITUS WITHOUT COMPLICATION, WITHOUT LONG-TERM CURRENT USE OF INSULIN: Primary | ICD-10-CM

## 2025-07-10 LAB
25(OH)D3 SERPL-MCNC: 34.9 NG/ML (ref 30–100)
ALBUMIN SERPL-MCNC: 4.3 G/DL (ref 3.5–5.2)
ALBUMIN/GLOB SERPL: 1.9 G/DL
ALP SERPL-CCNC: 61 U/L (ref 39–117)
ALT SERPL W P-5'-P-CCNC: 15 U/L (ref 1–41)
ANION GAP SERPL CALCULATED.3IONS-SCNC: 8.8 MMOL/L (ref 5–15)
AST SERPL-CCNC: 18 U/L (ref 1–40)
BASOPHILS # BLD AUTO: 0.01 10*3/MM3 (ref 0–0.2)
BASOPHILS NFR BLD AUTO: 0.2 % (ref 0–1.5)
BILIRUB SERPL-MCNC: 0.5 MG/DL (ref 0–1.2)
BUN SERPL-MCNC: 15 MG/DL (ref 8–23)
BUN/CREAT SERPL: 17.6 (ref 7–25)
CALCIUM SPEC-SCNC: 9.1 MG/DL (ref 8.6–10.5)
CHLORIDE SERPL-SCNC: 105 MMOL/L (ref 98–107)
CO2 SERPL-SCNC: 24.2 MMOL/L (ref 22–29)
CREAT SERPL-MCNC: 0.85 MG/DL (ref 0.76–1.27)
DEPRECATED RDW RBC AUTO: 41.5 FL (ref 37–54)
EGFRCR SERPLBLD CKD-EPI 2021: 98.2 ML/MIN/1.73
EOSINOPHIL # BLD AUTO: 0.07 10*3/MM3 (ref 0–0.4)
EOSINOPHIL NFR BLD AUTO: 1.2 % (ref 0.3–6.2)
ERYTHROCYTE [DISTWIDTH] IN BLOOD BY AUTOMATED COUNT: 12.7 % (ref 12.3–15.4)
EXPIRATION DATE: ABNORMAL
FOLATE SERPL-MCNC: 11.8 NG/ML (ref 4.78–24.2)
GLOBULIN UR ELPH-MCNC: 2.3 GM/DL
GLUCOSE SERPL-MCNC: 154 MG/DL (ref 65–99)
HBA1C MFR BLD: 6.6 % (ref 4.5–5.7)
HBA1C MFR BLD: 6.7 % (ref 4.8–5.6)
HCT VFR BLD AUTO: 46.2 % (ref 37.5–51)
HCV AB SER QL: NORMAL
HGB BLD-MCNC: 15 G/DL (ref 13–17.7)
IMM GRANULOCYTES # BLD AUTO: 0.03 10*3/MM3 (ref 0–0.05)
IMM GRANULOCYTES NFR BLD AUTO: 0.5 % (ref 0–0.5)
LYMPHOCYTES # BLD AUTO: 0.84 10*3/MM3 (ref 0.7–3.1)
LYMPHOCYTES NFR BLD AUTO: 13.9 % (ref 19.6–45.3)
Lab: ABNORMAL
MCH RBC QN AUTO: 29.2 PG (ref 26.6–33)
MCHC RBC AUTO-ENTMCNC: 32.5 G/DL (ref 31.5–35.7)
MCV RBC AUTO: 89.9 FL (ref 79–97)
MONOCYTES # BLD AUTO: 0.46 10*3/MM3 (ref 0.1–0.9)
MONOCYTES NFR BLD AUTO: 7.6 % (ref 5–12)
NEUTROPHILS NFR BLD AUTO: 4.65 10*3/MM3 (ref 1.7–7)
NEUTROPHILS NFR BLD AUTO: 76.6 % (ref 42.7–76)
NRBC BLD AUTO-RTO: 0 /100 WBC (ref 0–0.2)
PLATELET # BLD AUTO: 109 10*3/MM3 (ref 140–450)
PMV BLD AUTO: 12.4 FL (ref 6–12)
POTASSIUM SERPL-SCNC: 4.4 MMOL/L (ref 3.5–5.2)
PROT SERPL-MCNC: 6.6 G/DL (ref 6–8.5)
RBC # BLD AUTO: 5.14 10*6/MM3 (ref 4.14–5.8)
SODIUM SERPL-SCNC: 138 MMOL/L (ref 136–145)
WBC NRBC COR # BLD AUTO: 6.06 10*3/MM3 (ref 3.4–10.8)

## 2025-07-10 PROCEDURE — 85025 COMPLETE CBC W/AUTO DIFF WBC: CPT

## 2025-07-10 PROCEDURE — 82746 ASSAY OF FOLIC ACID SERUM: CPT

## 2025-07-10 PROCEDURE — 80053 COMPREHEN METABOLIC PANEL: CPT

## 2025-07-10 PROCEDURE — 99214 OFFICE O/P EST MOD 30 MIN: CPT | Performed by: FAMILY MEDICINE

## 2025-07-10 PROCEDURE — 86803 HEPATITIS C AB TEST: CPT

## 2025-07-10 PROCEDURE — 83036 HEMOGLOBIN GLYCOSYLATED A1C: CPT | Performed by: FAMILY MEDICINE

## 2025-07-10 PROCEDURE — 82306 VITAMIN D 25 HYDROXY: CPT

## 2025-07-10 PROCEDURE — 83036 HEMOGLOBIN GLYCOSYLATED A1C: CPT

## 2025-07-10 RX ORDER — ASPIRIN 81 MG/1
81 TABLET, CHEWABLE ORAL DAILY
Qty: 90 TABLET | Refills: 3 | Status: SHIPPED | OUTPATIENT
Start: 2025-07-10

## 2025-07-10 RX ORDER — TIRZEPATIDE 2.5 MG/.5ML
2.5 INJECTION, SOLUTION SUBCUTANEOUS WEEKLY
Qty: 2 ML | Refills: 0 | Status: SHIPPED | OUTPATIENT
Start: 2025-07-10

## 2025-07-10 NOTE — PATIENT INSTRUCTIONS
A1c today doing much better, down to 6.6% from 7.2% in April  I would like you to have your labs done about a week prior to your next appointment.  You do not need an appointment, but I would advise to call our office a few days before you plan to have your labs done to confirm that orders are in and active.  We will discuss the results at your next scheduled visit.  -Lab services are available at any [facility] outpatient lab center, including across the street at [address] [place] [address]   Your labs should be done when you're in a fasting state.  This means you should not have anything with calories for at least 10 hours prior to the blood draw. Water and black coffee are fine. Both blood sugar and triglyceride levels rise after meals, and this may impact your results. Hormone levels can also change, so unless you are specifically told you do not have to fast for labs, it is better to be fasting.    Additionally, stop any multivitamins or B vitamins (especially biotin) at least 48 hours before your labs since these can interfere with accurate measurement.

## 2025-07-10 NOTE — ASSESSMENT & PLAN NOTE
Lab Results   Component Value Date    HGBA1C 7.20 (H) 04/10/2025   Better today, but attributable mostly to summer activity. Still needs to work on weight loss and alcohol reduction (declines)  Change to Mounjaro 2.5mg (Ozempic 2mg not particularly effective)    -Reviewed counseling and precautions for GLP-1 agonists. No known history of gallstones or pancreatitis. No personal or family history of MEN-II or medullary thyroid cancer. Encouraged to ensure adequate fluid intake with either Pedialyte or other electrolyte-containing beverage. Encouraged to avoid alcohol altogether, but cautioned strictly no more than 3 per week to minimize risk of pancreatitis. Advised to call if he develops abdominal pain, vomiting, abdominal masses, or gross food intolerance as these may be signs of pancreatitis. They have been counseled on the theoretical increased risk of medullary thyroid cancer and advised to call if he develops any trouble swallowing, throat pain or swelling. No palpable fullness or nodularity on exam. Encouraged lifestyle modifications to minimize reflux symptoms and irritation including small meals, waiting 1 hour before lying down, avoidance of caffeine/alcohol/nicotine, and OTC medications as-needed. Encouraged to avoid NSAIDs or take with food if necessary to avoid prolonged exposure in the stomach with delayed gastric emptying. Counseled on medication-specific dosing changes as applicable.

## 2025-07-10 NOTE — ASSESSMENT & PLAN NOTE
Presrcibed Lisinopril 10mg, does not take regularly at all. Doing ok, will remove lisinopril from list.

## 2025-07-10 NOTE — PROGRESS NOTES
Established Patient Office Visit      Patient Name: Tripp Lnatigua  : 1965   MRN: [id number]   Care Team: Patient Care Team:  Venu Bermudez DO as PCP - General (Family Medicine)  Wallace Adhikari PA as Physician Assistant (Cardiology)    Chief Complaint:    Chief Complaint   Patient presents with    Diabetes       History of Present Illness: Tripp Lantigua is a 60 y.o. male who is here today for Diabetes.    HPI    History of Present Illness  The patient presents for a follow-up regarding his diabetes management. At his last visit in 2025, his A1c had increased from 6.9% to 7.2%. He was not tracking his food intake adequately. During that visit, the importance of maintaining a food journal and engaging in daily exercise was discussed. He was referred to diabetic education and nutrition and his Ozempic dosage was increased from 1 mg to 2 mg, with a plan to switch to Mounjaro if there was no significant improvement.    He reports tolerating the increased dosage of Ozempic well, with no adverse effects such as nausea, vomiting, constipation, or abdominal pain. His A1c has improved to 6.6%, and his weight has decreased slightly. 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 In the summer, he engages in more physical activities, including regular workouts. He maintains a healthy diet rich in vegetables and protein and has reduced his alcohol consumption to 8 or 9 drinks per week. He has not yet consulted a nutritionist.    He reports a herniation at T9, which was evaluated by Dr. Olivares and Dr. Ledezma. He is scheduled to see Dr. Ledezma on Wednesday for pain management. The pain varies from intense to negligible. A couple of weeks ago, he cleaned and restained 2200 square feet of products and felt okay afterward, but the pain recurred a couple of days later. He is scheduled for an epidural on Wednesday and is currently feeling  better. He takes 1/4 pill of Vicodin before mowing the yard, which helps manage the pain. He experienced a bite on his back while mowing the yard a few weeks ago, causing significant pain, but the pain subsided two days later before returning the next day.    He has not taken testosterone since 02/2025. His energy level is good, and he feels in good shape but would like to add more muscle mass. He finds it difficult to lift weights and does not see much improvement from his workouts.    He has not taken lisinopril for months, as his blood pressure is under control due to his lifestyle rather than medication.    He reports a significant amount of yeast in his throat, causing difficulty swallowing. He consulted an ENT at  and was prescribed a dissolvable tablet that caused a gingivitis reaction in his mouth. He is currently weaning off this medication. He is scheduled to have the oliver removed from his mouth on 07/20/2025 or 07/21/2025, which may aid in weight loss if he can only consume food through a straw for at least a week.    SOCIAL HISTORY  The patient consumes approximately 8 or 9 alcoholic drinks per week.    FAMILY HISTORY  The patient reports a strong family history of diabetes.       The following portions of the patient's history were reviewed and updated as appropriate: allergies, current medications, past family history, past medical history, past social history, past surgical history and problem list.    Subjective      Review of Systems:   Review of Systems - See HPI    Past Medical History:   Past Medical History:   Diagnosis Date    Allergic 1996    Arthritis     Blood glucose elevated     Chronic bronchitis     Colitis     Colon polyp     Diverticulitis     Diverticulosis     GERD (gastroesophageal reflux disease)     Hypertension     IBS (irritable bowel syndrome)     Kidney stone        Past Surgical History:   Past Surgical History:   Procedure Laterality Date    COLON RESECTION N/A 8/15/2018     Procedure: OPEN LOW ANTERIOR RESECTION WITH TAKEDOWN OF COLOVESICAL FISTULA, MOBILIZATION OF SPLENIC FLEXIURE, APPENDECTOMY, AND ILEOSTOMY;  Surgeon: Daniel Cline MD;  Location:  [facility];  Service: General    COLONOSCOPY      CYSTOSCOPY Bilateral 8/15/2018    Procedure: CYSTOSCOPY WITH BILATERAL URETERAL STENT PLACEMENT;  Surgeon: Daniel Cline MD;  Location:  [facility];  Service: General    JOINT REPLACEMENT Bilateral     hip       Family History:   Family History   Problem Relation Age of Onset    Diverticulosis Mother     Cancer Father     Diverticulosis Father     Cancer Maternal Grandfather     Heart disease Maternal Grandfather     Hypertension Maternal Grandfather     Heart attack Maternal Grandfather        Social History:   Social History     Socioeconomic History    Marital status:    Tobacco Use    Smoking status: Never     Passive exposure: Never    Smokeless tobacco: Never   Vaping Use    Vaping status: Never Used   Substance and Sexual Activity    Alcohol use: Yes     Alcohol/week: 16.0 standard drinks of alcohol     Types: 16 Shots of liquor per week     Comment: Average 14 drinks a week    Drug use: No    Sexual activity: Yes     Partners: Female       Tobacco History:   Social History     Tobacco Use   Smoking Status Never    Passive exposure: Never   Smokeless Tobacco Never       Medications:   Outpatient Medications Prior to Visit   Medication Sig Dispense Refill    B Complex Vitamins (VITAMIN B COMPLEX PO) Take 1 capsule by mouth Daily.      BREO ELLIPTA 200-25 MCG/INH inhaler Inhale 1 puff As Needed.      Coenzyme Q10 (CoQ10) 100 MG capsule Take 1 capsule by mouth Daily. Take with statin. 90 each 3    fluticasone (FLONASE) 50 MCG/ACT nasal spray Administer 2 sprays into the nostril(s) as directed by provider Daily. 16 g 11    ibuprofen (ADVIL,MOTRIN) 200 MG tablet Take 1 tablet by mouth Every 6 (Six) Hours As Needed for Mild Pain.      L-GLUTAMINE PO Take 1 capsule by mouth Daily.       Melatonin 10 MG tablet Take  by mouth As Needed.      Multiple Vitamins-Minerals (MULTIVITAMIN WITH MINERALS) tablet tablet Take 1 tablet by mouth Daily.      pitavastatin calcium (LIVALO) 2 MG tablet tablet Take 1 tablet by mouth Every Night. 90 tablet 1    tadalafil (CIALIS) 5 MG tablet Take 1 tablet by mouth Daily As Needed for Erectile Dysfunction. 90 tablet 0    tiZANidine (ZANAFLEX) 2 MG tablet Take 0.5-1 tablets by mouth At Night As Needed for Muscle Spasms. 90 tablet 0    Ozempic, 2 MG/DOSE, 8 MG/3ML solution pen-injector Inject 2 mg under the skin into the appropriate area as directed 1 (One) Time Per Week. 3 mL 2    aspirin 81 MG chewable tablet Chew 1 tablet Daily. (Patient not taking: Reported on 7/10/2025) 90 tablet 3    Glucosamine-Chondroit-Vit C-Mn (GLUCOSAMINE 1500 COMPLEX) capsule Take 1,500 mg by mouth Daily. (Patient not taking: Reported on 7/10/2025)      Krill Oil 1000 MG capsule Take 1 capsule by mouth Daily. (Patient not taking: Reported on 7/10/2025)      lisinopril (PRINIVIL,ZESTRIL) 10 MG tablet Take 1 tablet by mouth 2 (Two) Times a Week. (Patient not taking: Reported on 7/10/2025)      Milk Thistle 1000 MG capsule Take 1 capsule by mouth Daily. (Patient not taking: Reported on 7/10/2025)      omeprazole (priLOSEC) 40 MG capsule Take 1 capsule by mouth Daily. (Patient not taking: Reported on 1/9/2025) 90 capsule 3    testosterone (ANDROGEL) 50 MG/5GM (1%) gel gel Place 50 mg on the skin as directed by provider Daily. 30 each 2    vitamin C (ASCORBIC ACID) 500 MG tablet Take 1 tablet by mouth Daily. (Patient not taking: Reported on 7/10/2025)       No facility-administered medications prior to visit.        Allergies:   Allergies   Allergen Reactions    Metformin GI Intolerance    Codeine Unknown - Low Severity     codeine    Acetaminophen Other (See Comments)     GI upset  GI upset         Objective   Objective     Physical Exam:  Vital Signs:   Vitals:    07/10/25 0900   BP: 124/82  "  Pulse: 74   SpO2: 97%   Weight: 116 kg (256 lb 1.6 oz)   Height: 182.9 cm (72.01\")     Body mass index is 34.73 kg/m².     Physical Exam  Nursing note reviewed  Const: NAD, A&Ox4, Pleasant, Cooperative  Eyes: EOMI, no conjunctivitis  ENT: No nasal discharge present, neck supple  Cardiac: Regular rate and rhythm, no cyanosis  Resp: Respiratory rate within normal limits, no increased work of breathing, no audible wheezing or retractions noted  GI: No distention or ascites  MSK: Motor and sensation grossly intact in bilateral upper extremities  Neurologic: CN II-XII grossly intact  Psych: Appropriate mood and behavior.  Skin: Warm, dry  Procedures/Radiology     Procedures  No radiology results for the last 7 days     Assessment & Plan   Assessment / Plan      Assessment/Plan:   Problems Addressed This Visit  Diagnoses and all orders for this visit:    1. Type 2 diabetes mellitus without complication, without long-term current use of insulin (Primary)  Assessment & Plan:  Lab Results   Component Value Date    HGBA1C 7.20 (H) 04/10/2025   Better today, but attributable mostly to summer activity. Still needs to work on weight loss and alcohol reduction (declines)  Change to Mounjaro 2.5mg (Ozempic 2mg not particularly effective)    -Reviewed counseling and precautions for GLP-1 agonists. No known history of gallstones or pancreatitis. No personal or family history of MEN-II or medullary thyroid cancer. Encouraged to ensure adequate fluid intake with either Pedialyte or other electrolyte-containing beverage. Encouraged to avoid alcohol altogether, but cautioned strictly no more than 3 per week to minimize risk of pancreatitis. Advised to call if he develops abdominal pain, vomiting, abdominal masses, or gross food intolerance as these may be signs of pancreatitis. They have been counseled on the theoretical increased risk of medullary thyroid cancer and advised to call if he develops any trouble swallowing, throat pain " or swelling. No palpable fullness or nodularity on exam. Encouraged lifestyle modifications to minimize reflux symptoms and irritation including small meals, waiting 1 hour before lying down, avoidance of caffeine/alcohol/nicotine, and OTC medications as-needed. Encouraged to avoid NSAIDs or take with food if necessary to avoid prolonged exposure in the stomach with delayed gastric emptying. Counseled on medication-specific dosing changes as applicable.     Orders:  -     POC Glycosylated Hemoglobin (Hb A1C)  -     Mounjaro 2.5 MG/0.5ML solution auto-injector; Inject 2.5 mg under the skin into the appropriate area as directed 1 (One) Time Per Week. Increase to 5mg after 4 weeks  Dispense: 2 mL; Refill: 0  -     CBC & Differential; Future  -     Hemoglobin A1c; Future  -     Comprehensive Metabolic Panel; Future    2. Vitamin D deficiency  -     Cholecalciferol (vitamin D3) 125 MCG (5000 UT) tablet tablet; Take 1 tablet by mouth Daily.  Dispense: 90 tablet; Refill: 3  -     Vitamin D,25-Hydroxy; Future    3. Hypogonadism in male  Assessment & Plan:  Has not been using testosterone, low in April with symptoms. Recommend starting back on 50mg daily      4. Testosterone deficiency  -     testosterone (ANDROGEL) 50 MG/5GM (1%) gel gel; Place 50 mg on the skin as directed by provider Daily.  Dispense: 30 each; Refill: 2    5. Coronary artery disease involving native coronary artery of native heart without angina pectoris  -     aspirin 81 MG chewable tablet; Chew 1 tablet Daily.  Dispense: 90 tablet; Refill: 3    6. Encounter for hepatitis C screening test for low risk patient  -     Hepatitis C Antibody; Future    7. Moderate alcohol use disorder  -     Folate; Future      Problem List Items Addressed This Visit          Cardiac and Vasculature    Coronary artery disease involving native heart without angina pectoris    Overview   CACS- .6, total 161.7  Rosuvastatin 10mg not tolerated  -Trial of pitavastatin 2mg  daily         Relevant Medications    aspirin 81 MG chewable tablet       Endocrine and Metabolic    Type 2 diabetes mellitus, without long-term current use of insulin - Primary (Chronic)    Current Assessment & Plan   Lab Results   Component Value Date    HGBA1C 7.20 (H) 04/10/2025   Better today, but attributable mostly to summer activity. Still needs to work on weight loss and alcohol reduction (declines)  Change to Mounjaro 2.5mg (Ozempic 2mg not particularly effective)    -Reviewed counseling and precautions for GLP-1 agonists. No known history of gallstones or pancreatitis. No personal or family history of MEN-II or medullary thyroid cancer. Encouraged to ensure adequate fluid intake with either Pedialyte or other electrolyte-containing beverage. Encouraged to avoid alcohol altogether, but cautioned strictly no more than 3 per week to minimize risk of pancreatitis. Advised to call if he develops abdominal pain, vomiting, abdominal masses, or gross food intolerance as these may be signs of pancreatitis. They have been counseled on the theoretical increased risk of medullary thyroid cancer and advised to call if he develops any trouble swallowing, throat pain or swelling. No palpable fullness or nodularity on exam. Encouraged lifestyle modifications to minimize reflux symptoms and irritation including small meals, waiting 1 hour before lying down, avoidance of caffeine/alcohol/nicotine, and OTC medications as-needed. Encouraged to avoid NSAIDs or take with food if necessary to avoid prolonged exposure in the stomach with delayed gastric emptying. Counseled on medication-specific dosing changes as applicable.          Relevant Medications    Mounjaro 2.5 MG/0.5ML solution auto-injector    Other Relevant Orders    POC Glycosylated Hemoglobin (Hb A1C) (Completed)    CBC & Differential (Completed)    Hemoglobin A1c (Completed)    Comprehensive Metabolic Panel (Completed)    Vitamin D deficiency    Relevant  Medications    Cholecalciferol (vitamin D3) 125 MCG (5000 UT) tablet tablet    Other Relevant Orders    Vitamin D,25-Hydroxy (Completed)    Hypogonadism in male    Current Assessment & Plan   Has not been using testosterone, low in April with symptoms. Recommend starting back on 50mg daily          Other Visit Diagnoses         Testosterone deficiency        Relevant Medications    testosterone (ANDROGEL) 50 MG/5GM (1%) gel gel      Encounter for hepatitis C screening test for low risk patient        Relevant Orders    Hepatitis C Antibody (Completed)      Moderate alcohol use disorder        Relevant Orders    Folate (Completed)            New Medications Ordered This Visit   Medications    testosterone (ANDROGEL) 50 MG/5GM (1%) gel gel     Sig: Place 50 mg on the skin as directed by provider Daily.     Dispense:  30 each     Refill:  2    aspirin 81 MG chewable tablet     Sig: Chew 1 tablet Daily.     Dispense:  90 tablet     Refill:  3    Mounjaro 2.5 MG/0.5ML solution auto-injector     Sig: Inject 2.5 mg under the skin into the appropriate area as directed 1 (One) Time Per Week. Increase to 5mg after 4 weeks     Dispense:  2 mL     Refill:  0     Change from Ozempic    Cholecalciferol (vitamin D3) 125 MCG (5000 UT) tablet tablet     Sig: Take 1 tablet by mouth Daily.     Dispense:  90 tablet     Refill:  3        Assessment & Plan  1. Diabetes mellitus.  - A1c has improved from 7.2% in 04/2025 to 6.6% currently, indicating better control.  - Weight has decreased slightly.  - Current regimen of Ozempic 2 mg does not seem to be as effective as expected.  - Switch from Ozempic to Mounjaro recommended, with gradual increase in dosage over the next few months to further reduce A1c and weight. Continue exercise routine and maintain active lifestyle.    2. Herniated disc at T9.  - Reports variable pain levels, sometimes intense and other times no pain.  - Upcoming appointment with Dr. Ledezma for pain management and  scheduled for an epidural on Wednesday.  - Advised to continue monitoring symptoms and follow up with orthopedic specialist as needed.  - Continue physical activity as tolerated.    3. Low testosterone levels.  - Testosterone levels were within the normal range during the last check in 04/2025 but at the lower end.  - Resuming testosterone gel application once daily recommended to help increase muscle mass and improve energy levels.  - Testosterone injections can be considered, requiring weekly clinic visits or self-administration at home.  - Prescription for testosterone gel sent to pharmacy.    4. Blood pressure management.  - Has not taken lisinopril for months; reports blood pressure is under control due to lifestyle changes.  - Lisinopril will be discontinued from medication list.  - Continue monitoring blood pressure and maintain current lifestyle changes.       Patient Instructions   A1c today doing much better, down to 6.6% from 7.2% in April  I would like you to have your labs done about a week prior to your next appointment.  You do not need an appointment, but I would advise to call our office a few days before you plan to have your labs done to confirm that orders are in and active.  We will discuss the results at your next scheduled visit.  -Lab services are available at any [facility] outpatient lab center, including across the street at [address] [place] [address] Dr   Your labs should be done when you're in a fasting state.  This means you should not have anything with calories for at least 10 hours prior to the blood draw. Water and black coffee are fine. Both blood sugar and triglyceride levels rise after meals, and this may impact your results. Hormone levels can also change, so unless you are specifically told you do not have to fast for labs, it is better to be fasting.    Additionally, stop any multivitamins or B vitamins (especially biotin) at least 48 hours before your labs since these can interfere with  accurate measurement.     Follow Up:   Return in about 3 months (around 10/10/2025) for Annual, (fasting blood work 1 week prior to appt).    DO TYSHAWN SánchezE [address]  [facility]  [address]  [facility] [address]  Fax [phone]  Phone [phone]    Patient or patient representative verbalized consent for the use of Ambient Listening during the visit with  Venu Bermudez DO for chart documentation. 7/14/2025 09:06 EDT     Disclaimer to patients: The [employer] C[employer] Act makes medical notes like these available to patients in the interest of transparency. However, please be advised that this is still a medical document. It is intended as [id number] communication. Many sections may include medical language or jargon, abbreviations, and additional verbiage that are unfamiliar or confusing. In some ways it may come across as blunt, direct, or may be summarized in order to clearly and concisely communicate the most crucial information to medical professionals. It may also include mentions of conditions that are unlikely but considered as part of the differential diagnosis, including serious disorders. These are not always discussed at length at the time of appointment because their likelihood is so low, but may be included in a medical note to make it clear what has been considered and/or ruled out as part of a work-up. Medical documents are intended to carry relevant information, facts as evident, and the personal clinical opinion of the physician. If you have any questions regarding this medical document, please bring them to the attention of the physician at your next scheduled appointment.

## 2025-07-10 NOTE — ASSESSMENT & PLAN NOTE
Has not been using testosterone, low in April with symptoms. Recommend starting back on 50mg daily

## 2025-07-14 RX ORDER — TESTOSTERONE GEL, 1% 10 MG/G
50 GEL TRANSDERMAL DAILY
Qty: 30 EACH | Refills: 2 | Status: SHIPPED | OUTPATIENT
Start: 2025-07-14

## 2025-07-16 ENCOUNTER — PRIOR AUTHORIZATION (OUTPATIENT)
Dept: FAMILY MEDICINE CLINIC | Facility: CLINIC | Age: 60
End: 2025-07-16
Payer: COMMERCIAL

## 2025-07-16 NOTE — TELEPHONE ENCOUNTER
(Key: [id number]) - [id number]  Testosterone 50 MG/5GM(1%) gel  status: PA Response - ApprovedCreated: July 16th, 2025Sent: July 16th, 2025    Approved today by [employer] 2017  CaseId:[id number];Status:Approved;Review Type:Prior Auth;Coverage Start Date:06/16/2025;Coverage End Date:07/16/2026;  Effective Date: 6/16/2025  Authorization Expiration Date: 7/16/2026

## 2025-07-30 ENCOUNTER — PATIENT MESSAGE (OUTPATIENT)
Dept: FAMILY MEDICINE CLINIC | Facility: CLINIC | Age: 60
End: 2025-07-30
Payer: COMMERCIAL

## 2025-07-30 ENCOUNTER — PRIOR AUTHORIZATION (OUTPATIENT)
Dept: FAMILY MEDICINE CLINIC | Facility: CLINIC | Age: 60
End: 2025-07-30
Payer: COMMERCIAL

## 2025-07-30 NOTE — TELEPHONE ENCOUNTER
(Key: [id number])  Mounjaro 2.5MG/0.5ML auto-injectors  Form  [employer] Electronic PA Form (2017 NCPDP)    Your request has been approved  CaseId:[id number];Status:Approved;Review Type:Prior Auth;Coverage Start Date:06/30/2025;Coverage End Date:07/30/2026;

## 2025-08-27 DIAGNOSIS — E11.9 TYPE 2 DIABETES MELLITUS WITHOUT COMPLICATION, WITHOUT LONG-TERM CURRENT USE OF INSULIN: ICD-10-CM

## 2025-08-27 DIAGNOSIS — E78.5 DYSLIPIDEMIA, GOAL LDL BELOW 70: ICD-10-CM

## 2025-08-28 RX ORDER — TIRZEPATIDE 2.5 MG/.5ML
2.5 INJECTION, SOLUTION SUBCUTANEOUS WEEKLY
Qty: 2 ML | Refills: 0 | Status: SHIPPED | OUTPATIENT
Start: 2025-08-28

## 2025-08-28 RX ORDER — PITAVASTATIN CALCIUM 2.09 MG/1
2 TABLET, FILM COATED ORAL NIGHTLY
Qty: 90 TABLET | Refills: 1 | Status: SHIPPED | OUTPATIENT
Start: 2025-08-28

## 2025-08-28 RX ORDER — TIRZEPATIDE 2.5 MG/.5ML
INJECTION, SOLUTION SUBCUTANEOUS
Qty: 2 ML | Refills: 0 | OUTPATIENT
Start: 2025-08-28

## (undated) DEVICE — 3M™ IOBAN™ 2 ANTIMICROBIAL INCISE DRAPE 6650EZ: Brand: IOBAN™ 2

## (undated) DEVICE — TOTAL TRAY, 16FR 10ML SIL FOLEY, URN: Brand: MEDLINE

## (undated) DEVICE — LEX BASIC NO DRAPE: Brand: MEDLINE INDUSTRIES, INC.

## (undated) DEVICE — ENCORE® LATEX ACCLAIM SIZE 6, STERILE LATEX POWDER-FREE SURGICAL GLOVE: Brand: ENCORE

## (undated) DEVICE — MEDI-VAC NON-CONDUCTIVE SUCTION TUBING: Brand: CARDINAL HEALTH

## (undated) DEVICE — SUT PDS 1 CTX 36IN VIO PDP371T

## (undated) DEVICE — SUT ETHLN 2/0 PS 18IN 585H

## (undated) DEVICE — JELLY,LUBE,STERILE,FLIP TOP,TUBE,2-OZ: Brand: MEDLINE

## (undated) DEVICE — CATH URETRL FLXITIP CLSD/END 5F 70CM RT

## (undated) DEVICE — ENCORE® LATEX MICRO SIZE 7, STERILE LATEX POWDER-FREE SURGICAL GLOVE: Brand: ENCORE

## (undated) DEVICE — GOWN,NON-REINFORCED,SIRUS,SET IN SLV,XL: Brand: MEDLINE

## (undated) DEVICE — ANTIBACTERIAL UNDYED BRAIDED (POLYGLACTIN 910), SYNTHETIC ABSORBABLE SUTURE: Brand: COATED VICRYL

## (undated) DEVICE — TUBING, SUCTION, 1/4" X 10', STRAIGHT: Brand: MEDLINE

## (undated) DEVICE — MEDI-VAC YANKAUER SUCTION HANDLE: Brand: CARDINAL HEALTH

## (undated) DEVICE — SUT VIC 12X27 D8116 BX/12

## (undated) DEVICE — GLV SURG SENSICARE MICRO PF LF 7.5 STRL

## (undated) DEVICE — COVER,MAYO STAND,XL,STERILE: Brand: MEDLINE

## (undated) DEVICE — SUT PROLN 2/0 PC3 8833H

## (undated) DEVICE — SPNG LAP PREWSH SFTPK 18X18IN STRL PK/5

## (undated) DEVICE — INTENDED FOR TISSUE SEPARATION, AND OTHER PROCEDURES THAT REQUIRE A SHARP SURGICAL BLADE TO PUNCTURE OR CUT.: Brand: BARD-PARKER ® STAINLESS STEEL BLADES

## (undated) DEVICE — CATH FOL COUDE TIEMAN 2WY 16F 5CC

## (undated) DEVICE — PENCL E/S HNDSWCH ROCKRBTN HOLSTR 10FT

## (undated) DEVICE — CANNULA,OXY,ADULT,SUPERSOFT,W/7'TUB,UC: Brand: MEDLINE

## (undated) DEVICE — DRSNG WND BORDR/ADHS NONADHR/GZ LF 4X4IN STRL

## (undated) DEVICE — AIRWY SZ11

## (undated) DEVICE — SUT MNCRYL PLS ANTIB UD 3/0 PS2 27IN

## (undated) DEVICE — CLTH CLENS READYCLEANSE PERI CARE PK/5

## (undated) DEVICE — TRY SKINPREP DRYPREP

## (undated) DEVICE — DBD-DRAPE,LAP,CHOLE,W/TROUGHS,STERILE: Brand: MEDLINE

## (undated) DEVICE — CVR HNDL LT SURG ACCSSRY BLU STRL

## (undated) DEVICE — CATH URETRL FLXITP POLLACK STD 6F 70CM

## (undated) DEVICE — Device

## (undated) DEVICE — TOWEL,OR,DSP,ST,BLUE,STD,4/PK,20PK/CS: Brand: MEDLINE

## (undated) DEVICE — MEDI-VAC YANKAUER SUCTION HANDLE W/BULBOUS TIP: Brand: CARDINAL HEALTH

## (undated) DEVICE — ENCORE® LATEX MICRO SIZE 6, STERILE LATEX POWDER-FREE SURGICAL GLOVE: Brand: ENCORE

## (undated) DEVICE — PK UROL DAVINCI 10

## (undated) DEVICE — SOL LR 1000ML

## (undated) DEVICE — IRRISEPT WOUND DEBRIDMENT AND CLEANSING SYSTEM: Brand: IRRISEPT

## (undated) DEVICE — PROVIDES A STERILE INTERFACE BETWEEN THE OPERATING ROOM SURGICAL LAMPS (NON-STERILE) AND THE SURGEON OR NURSE (STERILE).: Brand: STERION®CLAMP COVER FABRIC

## (undated) DEVICE — LEGGINGS, PAIR, 29X43, STERILE: Brand: MEDLINE

## (undated) DEVICE — NITINOL WIRE WITH HYDROPHILIC TIP: Brand: SENSOR

## (undated) DEVICE — DRAINBAG,ANTI-REFLUX TOWER,L/F,2000ML,LL: Brand: MEDLINE

## (undated) DEVICE — A RAPID TEST FOR THE QUALITATIVE DETECTION OF RESPIRATORY SYNCYTIAL VIRUS (RSV) IN NASAL WASH AND NASOPHARYNGEAL SWAB SPECIMENS.: Brand: BINAXNOW RSV

## (undated) DEVICE — GLV SURG BIOGEL SENSR LTX PF SZ7.5

## (undated) DEVICE — COVER,LIGHT HANDLE,FLX,1/PK: Brand: MEDLINE INDUSTRIES, INC.

## (undated) DEVICE — SYS SKIN CLS DERMABOND PRINEO W/22CM MESH TP